# Patient Record
Sex: FEMALE | Race: WHITE | Employment: OTHER | URBAN - METROPOLITAN AREA
[De-identification: names, ages, dates, MRNs, and addresses within clinical notes are randomized per-mention and may not be internally consistent; named-entity substitution may affect disease eponyms.]

---

## 2019-06-18 RX ORDER — FERROUS SULFATE 325(65) MG
325 TABLET ORAL
COMMUNITY
End: 2021-02-26 | Stop reason: ALTCHOICE

## 2019-06-21 ENCOUNTER — PREP FOR PROCEDURE (OUTPATIENT)
Dept: SURGERY | Age: 39
End: 2019-06-21

## 2019-06-21 ENCOUNTER — ANESTHESIA (OUTPATIENT)
Dept: OPERATING ROOM | Age: 39
End: 2019-06-21
Payer: MEDICARE

## 2019-06-21 ENCOUNTER — ANESTHESIA EVENT (OUTPATIENT)
Dept: OPERATING ROOM | Age: 39
End: 2019-06-21
Payer: MEDICARE

## 2019-06-21 ENCOUNTER — APPOINTMENT (OUTPATIENT)
Dept: GENERAL RADIOLOGY | Age: 39
End: 2019-06-21
Attending: PODIATRIST
Payer: MEDICARE

## 2019-06-21 ENCOUNTER — HOSPITAL ENCOUNTER (OUTPATIENT)
Age: 39
Setting detail: OUTPATIENT SURGERY
Discharge: HOME | End: 2019-06-21
Attending: PODIATRIST | Admitting: PODIATRIST
Payer: MEDICARE

## 2019-06-21 VITALS
SYSTOLIC BLOOD PRESSURE: 118 MMHG | RESPIRATION RATE: 16 BRPM | HEART RATE: 69 BPM | BODY MASS INDEX: 32.8 KG/M2 | WEIGHT: 209 LBS | HEIGHT: 67 IN | TEMPERATURE: 97.6 F | OXYGEN SATURATION: 99 % | DIASTOLIC BLOOD PRESSURE: 61 MMHG

## 2019-06-21 VITALS — SYSTOLIC BLOOD PRESSURE: 108 MMHG | OXYGEN SATURATION: 99 % | DIASTOLIC BLOOD PRESSURE: 64 MMHG

## 2019-06-21 DIAGNOSIS — R52 PAIN: ICD-10-CM

## 2019-06-21 DIAGNOSIS — G89.18 POSTOPERATIVE PAIN: Primary | ICD-10-CM

## 2019-06-21 PROBLEM — S92.912A: Status: ACTIVE | Noted: 2019-06-21

## 2019-06-21 LAB — METER GLUCOSE: 91 MG/DL (ref 74–99)

## 2019-06-21 PROCEDURE — 6360000002 HC RX W HCPCS: Performed by: PODIATRIST

## 2019-06-21 PROCEDURE — 3700000001 HC ADD 15 MINUTES (ANESTHESIA): Performed by: PODIATRIST

## 2019-06-21 PROCEDURE — 82962 GLUCOSE BLOOD TEST: CPT

## 2019-06-21 PROCEDURE — 7100000011 HC PHASE II RECOVERY - ADDTL 15 MIN: Performed by: PODIATRIST

## 2019-06-21 PROCEDURE — 6360000002 HC RX W HCPCS: Performed by: ANESTHESIOLOGY

## 2019-06-21 PROCEDURE — 3700000000 HC ANESTHESIA ATTENDED CARE: Performed by: PODIATRIST

## 2019-06-21 PROCEDURE — 3209999900 FLUORO FOR SURGICAL PROCEDURES

## 2019-06-21 PROCEDURE — 3600000003 HC SURGERY LEVEL 3 BASE: Performed by: PODIATRIST

## 2019-06-21 PROCEDURE — 2500000003 HC RX 250 WO HCPCS: Performed by: PODIATRIST

## 2019-06-21 PROCEDURE — 3600000013 HC SURGERY LEVEL 3 ADDTL 15MIN: Performed by: PODIATRIST

## 2019-06-21 PROCEDURE — 2580000003 HC RX 258

## 2019-06-21 PROCEDURE — 6360000002 HC RX W HCPCS

## 2019-06-21 PROCEDURE — 2709999900 HC NON-CHARGEABLE SUPPLY: Performed by: PODIATRIST

## 2019-06-21 PROCEDURE — 7100000010 HC PHASE II RECOVERY - FIRST 15 MIN: Performed by: PODIATRIST

## 2019-06-21 PROCEDURE — 88300 SURGICAL PATH GROSS: CPT

## 2019-06-21 RX ORDER — SODIUM CHLORIDE 0.9 % (FLUSH) 0.9 %
10 SYRINGE (ML) INJECTION EVERY 12 HOURS SCHEDULED
Status: DISCONTINUED | OUTPATIENT
Start: 2019-06-21 | End: 2019-06-21 | Stop reason: HOSPADM

## 2019-06-21 RX ORDER — SODIUM CHLORIDE 0.9 % (FLUSH) 0.9 %
10 SYRINGE (ML) INJECTION PRN
Status: DISCONTINUED | OUTPATIENT
Start: 2019-06-21 | End: 2019-06-21 | Stop reason: HOSPADM

## 2019-06-21 RX ORDER — SODIUM CHLORIDE 0.9 % (FLUSH) 0.9 %
10 SYRINGE (ML) INJECTION PRN
Status: CANCELLED | OUTPATIENT
Start: 2019-06-21

## 2019-06-21 RX ORDER — MIDAZOLAM HYDROCHLORIDE 1 MG/ML
2 INJECTION INTRAMUSCULAR; INTRAVENOUS ONCE
Status: COMPLETED | OUTPATIENT
Start: 2019-06-21 | End: 2019-06-21

## 2019-06-21 RX ORDER — SODIUM CHLORIDE 0.9 % (FLUSH) 0.9 %
10 SYRINGE (ML) INJECTION EVERY 12 HOURS SCHEDULED
Status: CANCELLED | OUTPATIENT
Start: 2019-06-21

## 2019-06-21 RX ORDER — MIDAZOLAM HYDROCHLORIDE 1 MG/ML
INJECTION INTRAMUSCULAR; INTRAVENOUS PRN
Status: DISCONTINUED | OUTPATIENT
Start: 2019-06-21 | End: 2019-06-21 | Stop reason: SDUPTHER

## 2019-06-21 RX ORDER — OXYCODONE AND ACETAMINOPHEN 7.5; 325 MG/1; MG/1
1 TABLET ORAL EVERY 6 HOURS PRN
Qty: 28 TABLET | Refills: 0 | Status: SHIPPED | OUTPATIENT
Start: 2019-06-21 | End: 2019-06-28

## 2019-06-21 RX ORDER — SODIUM CHLORIDE, SODIUM LACTATE, POTASSIUM CHLORIDE, CALCIUM CHLORIDE 600; 310; 30; 20 MG/100ML; MG/100ML; MG/100ML; MG/100ML
INJECTION, SOLUTION INTRAVENOUS CONTINUOUS PRN
Status: DISCONTINUED | OUTPATIENT
Start: 2019-06-21 | End: 2019-06-21 | Stop reason: SDUPTHER

## 2019-06-21 RX ORDER — BUPIVACAINE HYDROCHLORIDE 5 MG/ML
INJECTION, SOLUTION EPIDURAL; INTRACAUDAL PRN
Status: DISCONTINUED | OUTPATIENT
Start: 2019-06-21 | End: 2019-06-21 | Stop reason: ALTCHOICE

## 2019-06-21 RX ORDER — PROPOFOL 10 MG/ML
INJECTION, EMULSION INTRAVENOUS CONTINUOUS PRN
Status: DISCONTINUED | OUTPATIENT
Start: 2019-06-21 | End: 2019-06-21 | Stop reason: SDUPTHER

## 2019-06-21 RX ADMIN — PROPOFOL 130 MCG/KG/MIN: 10 INJECTION, EMULSION INTRAVENOUS at 08:06

## 2019-06-21 RX ADMIN — SODIUM CHLORIDE, POTASSIUM CHLORIDE, SODIUM LACTATE AND CALCIUM CHLORIDE: 600; 310; 30; 20 INJECTION, SOLUTION INTRAVENOUS at 07:58

## 2019-06-21 RX ADMIN — Medication 2 G: at 07:59

## 2019-06-21 RX ADMIN — MIDAZOLAM HYDROCHLORIDE 2 MG: 1 INJECTION, SOLUTION INTRAMUSCULAR; INTRAVENOUS at 07:14

## 2019-06-21 RX ADMIN — MIDAZOLAM HYDROCHLORIDE 2 MG: 1 INJECTION, SOLUTION INTRAMUSCULAR; INTRAVENOUS at 07:58

## 2019-06-21 ASSESSMENT — PULMONARY FUNCTION TESTS
PIF_VALUE: 0
PIF_VALUE: 1
PIF_VALUE: 0

## 2019-06-21 ASSESSMENT — PAIN - FUNCTIONAL ASSESSMENT: PAIN_FUNCTIONAL_ASSESSMENT: 0-10

## 2019-06-21 ASSESSMENT — PAIN SCALES - GENERAL
PAINLEVEL_OUTOF10: 0
PAINLEVEL_OUTOF10: 0

## 2019-06-21 ASSESSMENT — LIFESTYLE VARIABLES: SMOKING_STATUS: 1

## 2019-06-21 ASSESSMENT — PAIN DESCRIPTION - DESCRIPTORS: DESCRIPTORS: SHARP;THROBBING

## 2019-06-21 NOTE — ANESTHESIA PRE PROCEDURE
Department of Anesthesiology  Preprocedure Note       Name:  Kallie Pisano   Age:  35 y.o.  :  1980                                          MRN:  [id number]         Date:  2019      Surgeon: Jessica Knowles):  Lali Wilder DPM    Procedure: OPEN REDUCTION INTERNAL FIXATION THIRD TOE ON LEFT  ++KWIRES++  +++LATEX ALLERGY+++ (Left )    Medications prior to admission:   Prior to Admission medications    Medication Sig Start Date End Date Taking? Authorizing Provider   ferrous sulfate 325 (65 Fe) MG tablet Take 325 mg by mouth   Yes Historical Provider, MD   acarbose (PRECOSE) 50 MG tablet take 1 tablet by mouth three times a day with meals 19  Yes Historical Provider, MD   albuterol sulfate HFA (PROVENTIL HFA) 108 (90 Base) MCG/ACT inhaler Inhale 2 puffs into the lungs as needed Use am dos if needed and bring 18  Yes Historical Provider, MD   ALPRAZolam (XANAX) 0.5 MG tablet Take 0.5 mg by mouth 3 times daily.  Take am [facility] 06/21 1/10/19  Yes Historical Provider, MD   benztropine (COGENTIN) 2 MG tablet Take 2 mg by mouth 2 times daily Take am [facility] 19  Yes Historical Provider, MD   cloZAPine (CLOZARIL) 100 MG tablet Take 300 mg by mouth nightly  18  Yes Historical Provider, MD SUBRAMANIAN VITAMIN B-12 TR 1000 MCG TBCR take 1 tablet by mouth once daily as directed 19  Yes Historical Provider, MD   EPINEPHrine (Marlin Patshila 2-MARIBEL) 0.15 MG/0.3ML SOAJ Inject into the skin 3/12/08  Yes Historical Provider, MD   vitamin D (ERGOCALCIFEROL) 77163 units CAPS capsule Take 50,000 Units by mouth once a week  19  Yes Historical Provider, MD   estradiol (VIVELLE) 0.1 MG/24HR  19  Yes Historical Provider, MD   famotidine (PEPCID) 20 MG tablet Take 20 mg by mouth 2 times daily Take am dos 19  Yes Historical Provider, MD   fluticasone (FLONASE) 50 MCG/ACT nasal spray instill 1 spray into each nostril once daily for congestion 19  Yes Historical Provider, MD   Lactobacillus  Ketorolac Tromethamine Nausea Only    Pregabalin Other (See Comments)    Tramadol Nausea Only    Buspirone Hcl Rash    Clindamycin Rash    Gabapentin Rash    Metronidazole Rash    Morphine Rash     IV \"a red line started going up towards my heart\"       Problem List:  There is no problem list on file for this patient.       Past Medical History:        Diagnosis Date    Agoraphobia     Anemia     Asthma     Atypical nevi     SEV ATN L flank , Mod to Sev ATN L mid back     Bipolar 1 disorder ([address]     Borderline personality disorder ([address]     Claustrophobia     Fibromyalgia     Fracture of third toe, left, closed 2019    Generalized anxiety disorder     Hidradenitis suppurativa     usually axillas    Hypoglycemia     Paranoid schizophrenia ([address]     PONV (postoperative nausea and vomiting)     Seizure ([address]     if sugar drops    Severe depression ([address]        Past Surgical History:        Procedure Laterality Date    ANKLE SURGERY Left 2009    CARPAL TUNNEL RELEASE Left 1998     SECTION  2004    ENDOMETRIAL ABLATION  2009    Novasure    FOOT FUSION       x3     GASTRIC BYPASS SURGERY  1999    HERNIA REPAIR  2005    left abdomen    HYSTERECTOMY  2009    KNEE ARTHROSCOPY Left 2012    KNEE ARTHROSCOPY Right     x 2 in 2014    LEG TENDON SURGERY Left     OTHER SURGICAL HISTORY  2009    staples removed from hernia    OVARY REMOVAL  2006    ROTATOR CUFF REPAIR Right 1997    ROTATOR CUFF REPAIR Right 2012    revision    SHOULDER ARTHROSCOPY Right     TEMPOROMANDIBULAR JOINT SURGERY Left 2008    TEMPOROMANDIBULAR JOINT SURGERY Bilateral     TUBAL LIGATION  2004    VAGAL NERVE STIMULATION  2006 battery   [id number] [facility]       Social History:    Social History     Tobacco Use    Smoking status: Former Smoker     Packs/day: 1.00     Years: 20.00     Pack years: 20.00     Types: Cigarettes    Smokeless tobacco: Never Used   Substance Use Topics    Alcohol use: Not Currently     Comment: sober since 2011                                Counseling given: Not Answered      Vital Signs (Current):   Vitals:    06/18/19 1243   Weight: 213 lb (96.6 kg)   Height: 5' 6.5\" (1.689 m)                                              BP Readings from Last 3 Encounters:   No data found for BP       NPO Status:  1930 on 6/20/2019                                                                               BMI:   Wt Readings from Last 3 Encounters:   06/18/19 213 lb (96.6 kg)     Body mass index is 33.86 kg/m². CBC: No results found for: WBC, RBC, HGB, HCT, MCV, RDW, PLT    CMP: No results found for: NA, K, CL, CO2, BUN, CREATININE, GFRAA, AGRATIO, LABGLOM, GLUCOSE, PROT, CALCIUM, BILITOT, ALKPHOS, AST, ALT    POC Tests: No results for input(s): POCGLU, POCNA, POCK, POCCL, POCBUN, POCHEMO, POCHCT in the last 72 hours. Coags: No results found for: PROTIME, INR, APTT    HCG (If Applicable): No results found for: PREGTESTUR, PREGSERUM, HCG, HCGQUANT     ABGs: No results found for: PHART, PO2ART, EEM5JJH, NWG1YHG, BEART, Y7UMEVKA     Type & Screen (If Applicable):  No results found for: [facility]    Anesthesia Evaluation  Patient summary reviewed and Nursing notes reviewed   history of anesthetic complications: PONV.   Airway: Mallampati: III  TM distance: >3 FB   Neck ROM: full  Mouth opening: > = 3 FB Dental:    (+) implants  Comment: Pt has upper and lower implants    Pulmonary: breath sounds clear to auscultation  (+) asthma: exercise-induced asthma, current smoker (some day smoker)                           Cardiovascular:Negative CV ROS            Rhythm: regular  Rate: normal           Beta Blocker:  Not on Beta Blocker         Neuro/Psych:   (+) seizures (r/t hypoglycemia ): well controlled, neuromuscular disease (fibromyalgia):, psychiatric history: stable with treatmentdepression/anxiety              ROS comment: VAGAL NERVE STIMULATION GI/Hepatic/Renal:   (+) GERD: well controlled,          ROS comment: S/p gastric bypass. Endo/Other:    (+) hypothyroidism::., .                  ROS comment: TEMPOROMANDIBULAR JOINT SURGERY Abdominal:           Vascular: negative vascular ROS. Anesthesia Plan      MAC     ASA 3       Induction: intravenous. Anesthetic plan and risks discussed with patient. Plan discussed with CRNA and attending. Lalo Chong RN   6/21/2019    DOS STAFF ADDENDUM:    Patient seen and examined, physical exam updated as needed, chart reviewed. NPO status confirmed. Anesthetic options and risks discussed with patient/legal guardian. Patient/legal guardian verbalized understanding and agrees to proceed.      Corinne Cape, MD  Staff Anesthesiologist  June 21, 2019  6:59 AM

## 2019-06-21 NOTE — ANESTHESIA POSTPROCEDURE EVALUATION
Department of Anesthesiology  Postprocedure Note    Patient: Cesar Barnett  MRN: [id number]  YOB: 1980  Date of evaluation: 6/21/2019  Time:  8:56 AM     Procedure Summary     Date:  06/21/19 Room / Location:  [facility] OR  / [facility] OR    Anesthesia Start:  5219 Anesthesia Stop:  3783    Procedure:  REMOVAL PAINFUL FIXATION THIRD TOE LEFT FOOT (Left ) Diagnosis:  (LEFT THIRD TOE FRACTURE)    Surgeon:  Santhosh Plummer DPM Responsible Provider:  Bobby Esparza MD    Anesthesia Type:  MAC ASA Status:  3          Anesthesia Type: MAC    Michaela Phase I: Michaela Score: 10    Michaela Phase II:      Last vitals: Reviewed and per EMR flowsheets.        Anesthesia Post Evaluation    Patient location during evaluation: PACU  Patient participation: complete - patient participated  Level of consciousness: awake and alert  Airway patency: patent  Nausea & Vomiting: no nausea and no vomiting  Complications: no  Cardiovascular status: hemodynamically stable  Respiratory status: acceptable  Hydration status: euvolemic

## 2020-06-09 PROBLEM — D48.5 NEOPLASM OF UNCERTAIN BEHAVIOR OF SKIN OF TRUNK: Status: ACTIVE | Noted: 2020-06-09

## 2020-08-31 ENCOUNTER — HOSPITAL ENCOUNTER (OUTPATIENT)
Age: 40
Discharge: HOME | End: 2020-09-02

## 2020-08-31 PROCEDURE — 88300 SURGICAL PATH GROSS: CPT

## 2020-10-01 ENCOUNTER — HOSPITAL ENCOUNTER (OUTPATIENT)
Age: 40
Discharge: HOME | End: 2020-10-03
Payer: MEDICARE

## 2020-10-01 PROCEDURE — 87070 CULTURE OTHR SPECIMN AEROBIC: CPT

## 2020-10-01 PROCEDURE — 87205 SMEAR GRAM STAIN: CPT

## 2020-10-01 PROCEDURE — 87075 CULTR BACTERIA EXCEPT BLOOD: CPT

## 2020-10-02 LAB — GRAM STAIN ORDERABLE: NORMAL

## 2020-10-03 LAB
ANAEROBIC CULTURE: NORMAL
ORGANISM: ABNORMAL
WOUND/ABSCESS: ABNORMAL

## 2020-10-11 ENCOUNTER — HOSPITAL ENCOUNTER (EMERGENCY)
Age: 40
Discharge: HOME | End: 2020-10-11
Attending: EMERGENCY MEDICINE
Payer: MEDICARE

## 2020-10-11 VITALS
DIASTOLIC BLOOD PRESSURE: 68 MMHG | OXYGEN SATURATION: 98 % | WEIGHT: 240 LBS | BODY MASS INDEX: 37.67 KG/M2 | HEIGHT: 67 IN | RESPIRATION RATE: 16 BRPM | SYSTOLIC BLOOD PRESSURE: 110 MMHG | HEART RATE: 82 BPM | TEMPERATURE: 98 F

## 2020-10-11 LAB
ANION GAP SERPL CALCULATED.3IONS-SCNC: 8 MMOL/L (ref 7–16)
BASOPHILS ABSOLUTE: 0.04 E9/L (ref 0–0.2)
BASOPHILS RELATIVE PERCENT: 0.5 % (ref 0–2)
BUN BLDV-MCNC: 8 MG/DL (ref 6–20)
C-REACTIVE PROTEIN: 2.8 MG/DL (ref 0–0.4)
CALCIUM SERPL-MCNC: 9.4 MG/DL (ref 8.6–10.2)
CHLORIDE BLD-SCNC: 100 MMOL/L (ref 98–107)
CO2: 26 MMOL/L (ref 22–29)
CREAT SERPL-MCNC: 0.7 MG/DL (ref 0.5–1)
EOSINOPHILS ABSOLUTE: 0.13 E9/L (ref 0.05–0.5)
EOSINOPHILS RELATIVE PERCENT: 1.6 % (ref 0–6)
GFR AFRICAN AMERICAN: >60
GFR NON-AFRICAN AMERICAN: >60 ML/MIN/1.73
GLUCOSE BLD-MCNC: 96 MG/DL (ref 74–99)
HCT VFR BLD CALC: 41.2 % (ref 34–48)
HEMOGLOBIN: 13 G/DL (ref 11.5–15.5)
IMMATURE GRANULOCYTES #: 0.03 E9/L
IMMATURE GRANULOCYTES %: 0.4 % (ref 0–5)
LYMPHOCYTES ABSOLUTE: 2.08 E9/L (ref 1.5–4)
LYMPHOCYTES RELATIVE PERCENT: 26.3 % (ref 20–42)
MCH RBC QN AUTO: 29.5 PG (ref 26–35)
MCHC RBC AUTO-ENTMCNC: 31.6 % (ref 32–34.5)
MCV RBC AUTO: 93.6 FL (ref 80–99.9)
MONOCYTES ABSOLUTE: 0.7 E9/L (ref 0.1–0.95)
MONOCYTES RELATIVE PERCENT: 8.8 % (ref 2–12)
NEUTROPHILS ABSOLUTE: 4.94 E9/L (ref 1.8–7.3)
NEUTROPHILS RELATIVE PERCENT: 62.4 % (ref 43–80)
PDW BLD-RTO: 12.6 FL (ref 11.5–15)
PLATELET # BLD: 343 E9/L (ref 130–450)
PMV BLD AUTO: 9.4 FL (ref 7–12)
POTASSIUM REFLEX MAGNESIUM: 4.2 MMOL/L (ref 3.5–5)
RBC # BLD: 4.4 E12/L (ref 3.5–5.5)
SEDIMENTATION RATE, ERYTHROCYTE: 33 MM/HR (ref 0–20)
SODIUM BLD-SCNC: 134 MMOL/L (ref 132–146)
WBC # BLD: 7.9 E9/L (ref 4.5–11.5)

## 2020-10-11 PROCEDURE — 85651 RBC SED RATE NONAUTOMATED: CPT

## 2020-10-11 PROCEDURE — 87070 CULTURE OTHR SPECIMN AEROBIC: CPT

## 2020-10-11 PROCEDURE — 87075 CULTR BACTERIA EXCEPT BLOOD: CPT

## 2020-10-11 PROCEDURE — 36415 COLL VENOUS BLD VENIPUNCTURE: CPT

## 2020-10-11 PROCEDURE — 85025 COMPLETE CBC W/AUTO DIFF WBC: CPT

## 2020-10-11 PROCEDURE — 12001 RPR S/N/AX/GEN/TRNK 2.5CM/<: CPT

## 2020-10-11 PROCEDURE — 99283 EMERGENCY DEPT VISIT LOW MDM: CPT

## 2020-10-11 PROCEDURE — 86140 C-REACTIVE PROTEIN: CPT

## 2020-10-11 PROCEDURE — 80048 BASIC METABOLIC PNL TOTAL CA: CPT

## 2020-10-11 ASSESSMENT — ENCOUNTER SYMPTOMS
VOMITING: 0
DIARRHEA: 0
SINUS PRESSURE: 0
COUGH: 0
SORE THROAT: 0
NAUSEA: 0
SHORTNESS OF BREATH: 0
EYE PAIN: 0
EYE DISCHARGE: 0
ABDOMINAL DISTENTION: 0
EYE REDNESS: 0
BACK PAIN: 0
WHEEZING: 0

## 2020-10-11 ASSESSMENT — PAIN SCALES - GENERAL: PAINLEVEL_OUTOF10: 9

## 2020-10-11 NOTE — ED PROVIDER NOTES
Chief Complaint   Patient presents with    Wound Check       Patient is a 44-year-old female who presents today with a wound to the left lower extremity. Patient states on Friday she was seen by podiatry, Dr. Cheryln Denver had a nerve release surgery performed. She states area was closed and she was discharged home. She did notice bleeding Friday night, she called her podiatrist who informed her to come back to the ER. On presentation patient does have appear to have lost the bottom stitch to the suture and does have a small open area at the inferior aspect of her stitches. There is no fluid draining from this area, no evidence of erythema. There is mild tenderness palpation. Patient denies fevers, chills, chest pain or shortness of breath. She denies numbness, tingling, weakness in her extremities. The history is provided by the patient. No  was used. Review of Systems   Constitutional: Negative for chills and fever. HENT: Negative for ear pain, sinus pressure and sore throat. Eyes: Negative for pain, discharge and redness. Respiratory: Negative for cough, shortness of breath and wheezing. Cardiovascular: Negative for chest pain. Gastrointestinal: Negative for abdominal distention, diarrhea, nausea and vomiting. Genitourinary: Negative for dysuria and frequency. Musculoskeletal: Negative for arthralgias and back pain. Skin: Positive for wound. Negative for rash. Neurological: Negative for weakness and headaches. Hematological: Negative for adenopathy. All other systems reviewed and are negative. Physical Exam  Vitals signs and nursing note reviewed. Constitutional:       General: She is not in acute distress. Appearance: Normal appearance. She is well-developed. She is obese. She is not ill-appearing. HENT:      Head: Normocephalic and atraumatic. Eyes:      Pupils: Pupils are equal, round, and reactive to light.    Neck: Musculoskeletal: Normal range of motion and neck supple. Cardiovascular:      Rate and Rhythm: Normal rate and regular rhythm. Pulmonary:      Effort: Pulmonary effort is normal. No respiratory distress. Breath sounds: Normal breath sounds. No wheezing or rales. Abdominal:      General: Bowel sounds are normal.      Palpations: Abdomen is soft. Tenderness: There is no abdominal tenderness. There is no guarding or rebound. Skin:     General: Skin is warm and dry. Findings: Bruising present. Comments: Patient has a linear incision on the medial aspect of her left ankle posterior to the medial malleolus, stitches appear normal, there does appear to be one stitch that has fallen out at the inferior aspect which is leaving a small open gap. No erythema or fluid draining   Neurological:      Mental Status: She is alert and oriented to person, place, and time. Cranial Nerves: No cranial nerve deficit. Coordination: Coordination normal.          Procedures           Labs Reviewed   CBC WITH AUTO DIFFERENTIAL - Abnormal; Notable for the following components:       Result Value    MCHC 31.6 (*)     All other components within normal limits   SEDIMENTATION RATE - Abnormal; Notable for the following components:    Sed Rate 33 (*)     All other components within normal limits   C-REACTIVE PROTEIN - Abnormal; Notable for the following components:    CRP 2.8 (*)     All other components within normal limits   CULTURE, WOUND   CULTURE, ANAEROBIC   BASIC METABOLIC PANEL W/ REFLEX TO MG FOR LOW K     No orders to display         MDM  Number of Diagnoses or Management Options  Postoperative surgical complication involving subcutaneous tissue associated with non-dermatologic procedure, unspecified complication:   Diagnosis management comments: Patient is a 50-year-old female presents today for pain in bleeding from her left foot after surgery on Friday.   Podiatry resident did come to the ER for evaluation. It is likely that 1 stitch has become loose at the bottom of her incision which is causing the bleeding. Podiatry resident did obtain cultures, and did revise the sutures. Sed rate and CRP are elevated. White count is normal.  Cultures were sent. Patient does have follow-up with her podiatrist this Thursday. She is already on antibiotics per podiatry. Podiatry is comfortable with the patient being discharged home today and following up in the office. Vitals are remained stable. Amount and/or Complexity of Data Reviewed  Clinical lab tests: reviewed           ED Course as of Oct 11 1632   Sun Oct 11, 2020   1142 Podiatry is present at bedside, they will revise sutures    [JH]      ED Course User Index  [JH] Isma Sanchez DO       --------------------------------------------- PAST HISTORY ---------------------------------------------  Past Medical History:  has a past medical history of Agoraphobia, Anemia, Arthritis, Asthma, Atypical nevi, Bipolar 1 disorder ([address] Borderline personality disorder ([address] Claustrophobia, Fibromyalgia, Fracture of third toe, left, closed, Generalized anxiety disorder, Hidradenitis suppurativa, Hypoglycemia, Paranoid schizophrenia ([address] PONV (postoperative nausea and vomiting), Seizure ([address] Severe depression ([address] and Thyroid disease. Past Surgical History:  has a past surgical history that includes Gastric bypass surgery (); Allyn tooth extraction (); Rotator cuff repair (Right, ); Carpal tunnel release (Left, );  section (); Tubal ligation (); hernia repair (); Ovary removal (); vagal nerve stimulation (); Shoulder arthroscopy (Right); Temporomandibular joint surgery (Left, 2008); Temporomandibular joint surgery (Bilateral, ); Endometrial ablation (); Hysterectomy (); other surgical history (2009); Ankle surgery (Left, 2009); Rotator cuff repair (Right, 2012); Knee arthroscopy (Left, 2012);  Knee arthroscopy (Right); Leg Tendon Surgery (Left); Foot Fusion; Foot fracture surgery (Left, 6/21/2019); and other surgical history (Left, 06/08/2020). Social History:  reports that she quit smoking about 10 years ago. Her smoking use included cigarettes. She has a 20.00 pack-year smoking history. She has never used smokeless tobacco. She reports previous alcohol use. She reports that she does not use drugs. Family History: family history is not on file. The patients home medications have been reviewed. Allergies: Latex; Fish allergy;  Fentanyl; Hydrocodone-acetaminophen; Ketorolac tromethamine; Pregabalin; Tramadol; Buspirone hcl; Clindamycin; Gabapentin; Metronidazole; and Morphine    -------------------------------------------------- RESULTS -------------------------------------------------  Labs:  Results for orders placed or performed during the hospital encounter of 10/11/20   CBC Auto Differential   Result Value Ref Range    WBC 7.9 4.5 - 11.5 E9/L    RBC 4.40 3.50 - 5.50 E12/L    Hemoglobin 13.0 11.5 - 15.5 g/dL    Hematocrit 41.2 34.0 - 48.0 %    MCV 93.6 80.0 - 99.9 fL    MCH 29.5 26.0 - 35.0 pg    MCHC 31.6 (L) 32.0 - 34.5 %    RDW 12.6 11.5 - 15.0 fL    Platelets 598 426 - 631 E9/L    MPV 9.4 7.0 - 12.0 fL    Neutrophils % 62.4 43.0 - 80.0 %    Immature Granulocytes % 0.4 0.0 - 5.0 %    Lymphocytes % 26.3 20.0 - 42.0 %    Monocytes % 8.8 2.0 - 12.0 %    Eosinophils % 1.6 0.0 - 6.0 %    Basophils % 0.5 0.0 - 2.0 %    Neutrophils Absolute 4.94 1.80 - 7.30 E9/L    Immature Granulocytes # 0.03 E9/L    Lymphocytes Absolute 2.08 1.50 - 4.00 E9/L    Monocytes Absolute 0.70 0.10 - 0.95 E9/L    Eosinophils Absolute 0.13 0.05 - 0.50 E9/L    Basophils Absolute 0.04 0.00 - 0.20 M6/P   Basic Metabolic Panel w/ Reflex to MG   Result Value Ref Range    Sodium 134 132 - 146 mmol/L    Potassium reflex Magnesium 4.2 3.5 - 5.0 mmol/L    Chloride 100 98 - 107 mmol/L    CO2 26 22 - 29 mmol/L    Anion Gap 8 7 - 16 mmol/L    Glucose 96 74 - 99 mg/dL    BUN 8 6 - 20 mg/dL    CREATININE 0.7 0.5 - 1.0 mg/dL    GFR Non-African American >60 >=60 mL/min/1.73    GFR African American >60     Calcium 9.4 8.6 - 10.2 mg/dL   Sedimentation Rate   Result Value Ref Range    Sed Rate 33 (H) 0 - 20 mm/Hr   C-Reactive Protein   Result Value Ref Range    CRP 2.8 (H) 0.0 - 0.4 mg/dL       Radiology:  No orders to display       ------------------------- NURSING NOTES AND VITALS REVIEWED ---------------------------  Date / Time Roomed:  10/11/2020 10:40 AM  ED Bed Assignment:  28/28    The nursing notes within the ED encounter and vital signs as below have been reviewed. /68   Pulse 82   Temp 98 °F (36.7 °C)   Resp 16   Ht 5' 6.5\" (1.689 m)   Wt 240 lb (108.9 kg)   SpO2 98%   BMI 38.16 kg/m²   Oxygen Saturation Interpretation: Normal      ------------------------------------------ PROGRESS NOTES ------------------------------------------  I have spoken with the patient and discussed todays results, in addition to providing specific details for the plan of care and counseling regarding the diagnosis and prognosis. Their questions are answered at this time and they are agreeable with the plan. I discussed at length with them reasons for immediate return here for re evaluation. They will followup with primary care by calling their office tomorrow. --------------------------------- ADDITIONAL PROVIDER NOTES ---------------------------------  At this time the patient is without objective evidence of an acute process requiring hospitalization or inpatient management. They have remained hemodynamically stable throughout their entire ED visit and are stable for discharge with outpatient follow-up. The plan has been discussed in detail and they are aware of the specific conditions for emergent return, as well as the importance of follow-up. Discharge Medication List as of 10/11/2020 12:49 PM          Diagnosis:  1.

## 2020-10-11 NOTE — PROCEDURES
Date of service: 10/11/2020    Pre-procedure dx: wound, left foot  Post procedure dx: same    Procedure: Wound closure, left foot    Indications: Patient was seen and examined in the emergency room for strikethrough of her sx dressing as well as gapping of the incision site. Patient states she noticed strikethrough yesterday. She called and was instructed to reinforce the sx dressing,however she took the dressing down and reapplied a new one. The patient was instructed to go to the emergency room for inspection of surgical site and wound closure. Upon presentation,   Vasc: pedal pulses are palpable, CFT <3 seconds to the tips of the digits  Neuro: intact  Derm: distal 1cm of incision with noteable gapping. There is a ruptured monocryl stitch noted in wound bed. Remainder of surgical site well coapted with sutures intact. No cardinal signs of infection noted. There is no purulent drainage noted. eccymosis noted at the proximal aspect. MSK: no crepitus or fluctuance noted upon palpation of surfical site. Aerobic and anaerobic cx were obtained. CBC w/ diff, ESR and CRP were ordered. After all benefits risks and complications were discussed in detail the patient was consented for wound closure of the left foot. Procedure: The left foot was prepped in the usual asceptic manor with alcohol prep as pt has allergy to betadine. A local block of 10cc  lidocaine plain was given proximal to the surgical site. A 1cm area of gapping to surgical site was noted to the distal aspect. The wound was flushed with sterile saline and was inspected. No contamination or residual suture were noted. The wound edges were reapproximated utilizing 3-0 Prolene in a horizontal mattress and simple interrupted fashion. Wound was clean and edges appeared to be well coapted. A dry sterile dressing consisting of adaptic, 4x4 gauze, kerlix, and ace was applied to the left lower extremity.  The patient tolerated the procedure well and is to keep the dressing clean, dry and intact until follow-up in clinic on 10/15/2020. Josselyn Jett.  [place] CALIXTOM  Fellow- Foot and Ankle Surgery  [phone]

## 2020-10-13 LAB — WOUND/ABSCESS: NORMAL

## 2020-10-16 LAB — ANAEROBIC CULTURE: NORMAL

## 2020-10-29 ENCOUNTER — HOSPITAL ENCOUNTER (OUTPATIENT)
Age: 40
Discharge: HOME | End: 2020-10-31
Payer: MEDICARE

## 2020-10-29 PROCEDURE — 87070 CULTURE OTHR SPECIMN AEROBIC: CPT

## 2020-10-29 PROCEDURE — 87075 CULTR BACTERIA EXCEPT BLOOD: CPT

## 2020-10-29 PROCEDURE — 87205 SMEAR GRAM STAIN: CPT

## 2020-10-30 LAB — GRAM STAIN ORDERABLE: NORMAL

## 2020-11-01 LAB
ANAEROBIC CULTURE: NORMAL
WOUND/ABSCESS: NORMAL

## 2021-09-27 LAB — MRSA CULTURE ONLY: NORMAL

## 2021-12-02 RX ORDER — OXYCODONE HYDROCHLORIDE 10 MG/1
10 TABLET ORAL DAILY
COMMUNITY

## 2021-12-02 NOTE — PROGRESS NOTES
Have you been tested for COVID  Yes           Have you been told you were positive for COVID No  Have you had any known exposure to someone that is positive for COVID No  Do you have a cough                   No              Do you have shortness of breath No                 Do you have a sore throat            No                Are you having chills                    No                Are you having muscle aches. No                    Please come to the hospital wearing a mask and have your significant other wear a mask as well. Both of you should check your temperature before leaving to come here,  if it is 100 or higher please call [phone] for instruction.

## 2021-12-02 NOTE — PROGRESS NOTES
[facility] PRE-ADMISSION TESTING INSTRUCTIONS    The Preadmission Testing patient is instructed accordingly using the following criteria (check applicable):    ARRIVAL INSTRUCTIONS:  [x] Parking the day of Surgery is located in the Main Entrance lot. Upon entering the door, make an immediate right to the surgery reception desk    [x] Bring photo ID and insurance card    [] Bring in a copy of Living will or Durable Power of  papers. [x] Please be sure to arrange for responsible adult to provide transportation to and from the hospital    [x] Please arrange for responsible adult to be with you for the 24 hour period post procedure due to having anesthesia      GENERAL INSTRUCTIONS:    [x] Nothing by mouth after midnight, including gum, candy, mints or water    [x] You may brush your teeth, but do not swallow any water    [x] Take medications as instructed with 1-2 oz of water    [x] Stop herbal supplements and vitamins 5 days prior to procedure    [] Follow preop dosing of blood thinners per physician instructions    [] Take 1/2 dose of evening insulin, but no insulin after midnight    [x] No oral diabetic medications after midnight    [x] If diabetic and have low blood sugar or feel symptomatic, take 1-2oz apple juice only    [x] Bring inhalers day of surgery    [] Bring C-PAP/ Bi-Pap day of surgery    [] Bring urine specimen day of surgery    [x] Shower or bath with soap, lather and rinse well, AM of Surgery, no lotion, powders or creams to surgical site    [] Follow bowel prep as instructed per surgeon    [x] No tobacco products within 24 hours of surgery     [x] No alcohol or illegal drug use within 24 hours of surgery.     [x] Jewelry, body piercing's, eyeglasses, contact lenses and dentures are not permitted into surgery (bring cases)      [x] Please do not wear any nail polish, make up or hair products on the day of surgery    [x] You can expect a call the business day prior to procedure to notify you if your arrival time changes    [x] If you receive a survey after surgery we would greatly appreciate your comments    [] Parent/guardian of a minor must accompany their child and remain on the premises  the entire time they are under our care     [] Pediatric patients may bring favorite toy, blanket or comfort item with them    [] A caregiver or family member must remain with the patient during their stay if they are mentally handicapped, have dementia, disoriented or unable to use a call light or would be a safety concern if left unattended    [x] Please notify surgeon if you develop any illness between now and time of surgery (cold, cough, sore throat, fever, nausea, vomiting) or any signs of infections  including skin, wounds, and dental.    []  The Outpatient Pharmacy is available to fill your prescription here on your day of surgery, ask your preop nurse for details    [] Other instructions    EDUCATIONAL MATERIALS PROVIDED:    [] PAT Preoperative Education Packet/Booklet     [] Medication List    [] Transfusion bracelet applied with instructions    [] Shower with soap, lather and rinse well, and use CHG wipes provided the evening before surgery as instructed    [] Incentive spirometer with instructions

## 2021-12-03 ENCOUNTER — ANESTHESIA EVENT (OUTPATIENT)
Dept: OPERATING ROOM | Age: 41
End: 2021-12-03
Payer: MEDICARE

## 2021-12-06 ENCOUNTER — HOSPITAL ENCOUNTER (OUTPATIENT)
Dept: GENERAL RADIOLOGY | Age: 41
Setting detail: SURGERY ADMIT
Discharge: HOME | End: 2021-12-08
Attending: PODIATRIST
Payer: MEDICARE

## 2021-12-06 ENCOUNTER — ANESTHESIA (OUTPATIENT)
Dept: OPERATING ROOM | Age: 41
End: 2021-12-06
Payer: MEDICARE

## 2021-12-06 ENCOUNTER — HOSPITAL ENCOUNTER (OUTPATIENT)
Age: 41
Setting detail: OBSERVATION
Discharge: NURSING, REHAB OR LONG-TERM CARE | End: 2021-12-09
Attending: PODIATRIST | Admitting: FAMILY MEDICINE
Payer: MEDICARE

## 2021-12-06 VITALS
SYSTOLIC BLOOD PRESSURE: 149 MMHG | OXYGEN SATURATION: 100 % | RESPIRATION RATE: 3 BRPM | TEMPERATURE: 99.3 F | DIASTOLIC BLOOD PRESSURE: 72 MMHG

## 2021-12-06 DIAGNOSIS — Z98.890 STATUS POST OSTEOTOMY: Primary | ICD-10-CM

## 2021-12-06 DIAGNOSIS — R52 PAIN: ICD-10-CM

## 2021-12-06 PROBLEM — M19.079 ANKLE ARTHRITIS: Status: ACTIVE | Noted: 2021-12-06

## 2021-12-06 PROBLEM — Z96.661 HISTORY OF TOTAL REPLACEMENT OF RIGHT ANKLE: Status: ACTIVE | Noted: 2021-12-06

## 2021-12-06 PROBLEM — E03.9 ACQUIRED HYPOTHYROIDISM: Status: ACTIVE | Noted: 2021-12-06

## 2021-12-06 PROBLEM — J45.20 MILD INTERMITTENT ASTHMA: Status: ACTIVE | Noted: 2021-12-06

## 2021-12-06 LAB — METER GLUCOSE: 110 MG/DL (ref 74–99)

## 2021-12-06 PROCEDURE — 99220 PR INITIAL OBSERVATION CARE/DAY 70 MINUTES: CPT | Performed by: FAMILY MEDICINE

## 2021-12-06 PROCEDURE — C1713 ANCHOR/SCREW BN/BN,TIS/BN: HCPCS | Performed by: PODIATRIST

## 2021-12-06 PROCEDURE — 6360000002 HC RX W HCPCS

## 2021-12-06 PROCEDURE — 2500000003 HC RX 250 WO HCPCS

## 2021-12-06 PROCEDURE — 88311 DECALCIFY TISSUE: CPT

## 2021-12-06 PROCEDURE — 2720000001 HC MISC SURG SUPPLY STERILE $51-500: Performed by: PODIATRIST

## 2021-12-06 PROCEDURE — 3209999900 FLUORO FOR SURGICAL PROCEDURES

## 2021-12-06 PROCEDURE — C1776 JOINT DEVICE (IMPLANTABLE): HCPCS | Performed by: PODIATRIST

## 2021-12-06 PROCEDURE — 6360000002 HC RX W HCPCS: Performed by: NURSE ANESTHETIST, CERTIFIED REGISTERED

## 2021-12-06 PROCEDURE — 2500000003 HC RX 250 WO HCPCS: Performed by: NURSE ANESTHETIST, CERTIFIED REGISTERED

## 2021-12-06 PROCEDURE — 82962 GLUCOSE BLOOD TEST: CPT

## 2021-12-06 PROCEDURE — 3600000014 HC SURGERY LEVEL 4 ADDTL 15MIN: Performed by: PODIATRIST

## 2021-12-06 PROCEDURE — 6370000000 HC RX 637 (ALT 250 FOR IP): Performed by: FAMILY MEDICINE

## 2021-12-06 PROCEDURE — 2580000003 HC RX 258

## 2021-12-06 PROCEDURE — 7100000000 HC PACU RECOVERY - FIRST 15 MIN: Performed by: PODIATRIST

## 2021-12-06 PROCEDURE — 88304 TISSUE EXAM BY PATHOLOGIST: CPT

## 2021-12-06 PROCEDURE — 2500000003 HC RX 250 WO HCPCS: Performed by: PODIATRIST

## 2021-12-06 PROCEDURE — G0378 HOSPITAL OBSERVATION PER HR: HCPCS

## 2021-12-06 PROCEDURE — 2580000003 HC RX 258: Performed by: PODIATRIST

## 2021-12-06 PROCEDURE — 3700000001 HC ADD 15 MINUTES (ANESTHESIA): Performed by: PODIATRIST

## 2021-12-06 PROCEDURE — 7100000001 HC PACU RECOVERY - ADDTL 15 MIN: Performed by: PODIATRIST

## 2021-12-06 PROCEDURE — 2580000003 HC RX 258: Performed by: ANESTHESIOLOGY

## 2021-12-06 PROCEDURE — 2720000002 HC MISC SURG SUPPLY STERILE >$500: Performed by: PODIATRIST

## 2021-12-06 PROCEDURE — 2709999900 HC NON-CHARGEABLE SUPPLY: Performed by: PODIATRIST

## 2021-12-06 PROCEDURE — 6360000002 HC RX W HCPCS: Performed by: PODIATRIST

## 2021-12-06 PROCEDURE — 6360000002 HC RX W HCPCS: Performed by: ANESTHESIOLOGY

## 2021-12-06 PROCEDURE — 3700000000 HC ANESTHESIA ATTENDED CARE: Performed by: PODIATRIST

## 2021-12-06 PROCEDURE — 6370000000 HC RX 637 (ALT 250 FOR IP): Performed by: PODIATRIST

## 2021-12-06 PROCEDURE — 88300 SURGICAL PATH GROSS: CPT

## 2021-12-06 PROCEDURE — 3600000004 HC SURGERY LEVEL 4 BASE: Performed by: PODIATRIST

## 2021-12-06 DEVICE — SCREW BNE L28MM DIA3.5MM CORT S STL ST NONCANNULATED LOK: Type: IMPLANTABLE DEVICE | Status: FUNCTIONAL

## 2021-12-06 DEVICE — SCREW BNE L60MM DIA4MM CANC S STL ST CANN NONLOCKING FULL: Type: IMPLANTABLE DEVICE | Status: FUNCTIONAL

## 2021-12-06 DEVICE — IMPLANTABLE DEVICE: Type: IMPLANTABLE DEVICE | Site: FOOT | Status: FUNCTIONAL

## 2021-12-06 DEVICE — SCREW, FT, Ø6.7X65.0 MM
Type: IMPLANTABLE DEVICE | Site: FOOT | Status: FUNCTIONAL
Brand: COLAG™

## 2021-12-06 DEVICE — SCREW BNE L32MM DIA3.5MM CORT S STL ST NONCANNULATED LOK: Type: IMPLANTABLE DEVICE | Status: FUNCTIONAL

## 2021-12-06 DEVICE — SCREW, Ø6.7X45.0 MM
Type: IMPLANTABLE DEVICE | Site: FOOT | Status: FUNCTIONAL
Brand: COLAG™

## 2021-12-06 DEVICE — PLATE BNE L81MM 7 H S STL 1/3 TBLR LOK COMPR W/ CLLR FOR: Type: IMPLANTABLE DEVICE | Status: FUNCTIONAL

## 2021-12-06 RX ORDER — SODIUM CHLORIDE 0.9 % (FLUSH) 0.9 %
5-40 SYRINGE (ML) INJECTION EVERY 12 HOURS SCHEDULED
Status: DISCONTINUED | OUTPATIENT
Start: 2021-12-06 | End: 2021-12-09 | Stop reason: HOSPADM

## 2021-12-06 RX ORDER — DEXAMETHASONE SODIUM PHOSPHATE 4 MG/ML
INJECTION, SOLUTION INTRA-ARTICULAR; INTRALESIONAL; INTRAMUSCULAR; INTRAVENOUS; SOFT TISSUE PRN
Status: DISCONTINUED | OUTPATIENT
Start: 2021-12-06 | End: 2021-12-06 | Stop reason: SDUPTHER

## 2021-12-06 RX ORDER — LAMOTRIGINE 100 MG/1
100 TABLET ORAL NIGHTLY
Status: DISCONTINUED | OUTPATIENT
Start: 2021-12-06 | End: 2021-12-09 | Stop reason: HOSPADM

## 2021-12-06 RX ORDER — TOPIRAMATE 25 MG/1
25 TABLET ORAL 2 TIMES DAILY
Status: DISCONTINUED | OUTPATIENT
Start: 2021-12-06 | End: 2021-12-09 | Stop reason: HOSPADM

## 2021-12-06 RX ORDER — SODIUM CHLORIDE 0.9 % (FLUSH) 0.9 %
5-40 SYRINGE (ML) INJECTION PRN
Status: DISCONTINUED | OUTPATIENT
Start: 2021-12-06 | End: 2021-12-09 | Stop reason: HOSPADM

## 2021-12-06 RX ORDER — ALPRAZOLAM 0.25 MG/1
0.5 TABLET ORAL 4 TIMES DAILY PRN
Status: DISCONTINUED | OUTPATIENT
Start: 2021-12-06 | End: 2021-12-09 | Stop reason: HOSPADM

## 2021-12-06 RX ORDER — NICOTINE POLACRILEX 4 MG
15 LOZENGE BUCCAL PRN
Status: DISCONTINUED | OUTPATIENT
Start: 2021-12-06 | End: 2021-12-09 | Stop reason: HOSPADM

## 2021-12-06 RX ORDER — SODIUM CHLORIDE 9 MG/ML
25 INJECTION, SOLUTION INTRAVENOUS PRN
Status: DISCONTINUED | OUTPATIENT
Start: 2021-12-06 | End: 2021-12-09 | Stop reason: HOSPADM

## 2021-12-06 RX ORDER — POLYETHYLENE GLYCOL 3350 17 G/17G
17 POWDER, FOR SOLUTION ORAL DAILY PRN
Status: DISCONTINUED | OUTPATIENT
Start: 2021-12-06 | End: 2021-12-09 | Stop reason: HOSPADM

## 2021-12-06 RX ORDER — ACETAMINOPHEN 325 MG/1
650 TABLET ORAL EVERY 6 HOURS PRN
Status: DISCONTINUED | OUTPATIENT
Start: 2021-12-06 | End: 2021-12-09 | Stop reason: HOSPADM

## 2021-12-06 RX ORDER — ONDANSETRON 2 MG/ML
INJECTION INTRAMUSCULAR; INTRAVENOUS PRN
Status: DISCONTINUED | OUTPATIENT
Start: 2021-12-06 | End: 2021-12-06 | Stop reason: SDUPTHER

## 2021-12-06 RX ORDER — DEXTROSE MONOHYDRATE 50 MG/ML
100 INJECTION, SOLUTION INTRAVENOUS PRN
Status: DISCONTINUED | OUTPATIENT
Start: 2021-12-06 | End: 2021-12-09 | Stop reason: HOSPADM

## 2021-12-06 RX ORDER — SUCRALFATE 1 G/1
1 TABLET ORAL EVERY 6 HOURS PRN
Status: DISCONTINUED | OUTPATIENT
Start: 2021-12-06 | End: 2021-12-09 | Stop reason: HOSPADM

## 2021-12-06 RX ORDER — ONDANSETRON 2 MG/ML
4 INJECTION INTRAMUSCULAR; INTRAVENOUS EVERY 6 HOURS PRN
Status: DISCONTINUED | OUTPATIENT
Start: 2021-12-06 | End: 2021-12-09 | Stop reason: HOSPADM

## 2021-12-06 RX ORDER — GLYCOPYRROLATE 1 MG/5 ML
SYRINGE (ML) INTRAVENOUS PRN
Status: DISCONTINUED | OUTPATIENT
Start: 2021-12-06 | End: 2021-12-06 | Stop reason: SDUPTHER

## 2021-12-06 RX ORDER — CEFAZOLIN SODIUM 1 G/3ML
INJECTION, POWDER, FOR SOLUTION INTRAMUSCULAR; INTRAVENOUS PRN
Status: DISCONTINUED | OUTPATIENT
Start: 2021-12-06 | End: 2021-12-06 | Stop reason: SDUPTHER

## 2021-12-06 RX ORDER — ALBUTEROL SULFATE 90 UG/1
2 AEROSOL, METERED RESPIRATORY (INHALATION) EVERY 6 HOURS PRN
Status: DISCONTINUED | OUTPATIENT
Start: 2021-12-06 | End: 2021-12-06 | Stop reason: CLARIF

## 2021-12-06 RX ORDER — ONDANSETRON 4 MG/1
4 TABLET, ORALLY DISINTEGRATING ORAL EVERY 8 HOURS PRN
Status: DISCONTINUED | OUTPATIENT
Start: 2021-12-06 | End: 2021-12-09 | Stop reason: HOSPADM

## 2021-12-06 RX ORDER — SODIUM CHLORIDE 0.9 % (FLUSH) 0.9 %
5-40 SYRINGE (ML) INJECTION EVERY 12 HOURS SCHEDULED
Status: DISCONTINUED | OUTPATIENT
Start: 2021-12-06 | End: 2021-12-06 | Stop reason: SDUPTHER

## 2021-12-06 RX ORDER — PROMETHAZINE HYDROCHLORIDE 25 MG/ML
6.25 INJECTION, SOLUTION INTRAMUSCULAR; INTRAVENOUS
Status: DISCONTINUED | OUTPATIENT
Start: 2021-12-06 | End: 2021-12-06 | Stop reason: HOSPADM

## 2021-12-06 RX ORDER — ROCURONIUM BROMIDE 10 MG/ML
INJECTION, SOLUTION INTRAVENOUS PRN
Status: DISCONTINUED | OUTPATIENT
Start: 2021-12-06 | End: 2021-12-06 | Stop reason: SDUPTHER

## 2021-12-06 RX ORDER — LANOLIN ALCOHOL/MO/W.PET/CERES
1000 CREAM (GRAM) TOPICAL DAILY
Status: DISCONTINUED | OUTPATIENT
Start: 2021-12-07 | End: 2021-12-09 | Stop reason: HOSPADM

## 2021-12-06 RX ORDER — SODIUM CHLORIDE 9 MG/ML
25 INJECTION, SOLUTION INTRAVENOUS PRN
Status: DISCONTINUED | OUTPATIENT
Start: 2021-12-06 | End: 2021-12-06 | Stop reason: SDUPTHER

## 2021-12-06 RX ORDER — ALBUTEROL SULFATE 2.5 MG/3ML
2.5 SOLUTION RESPIRATORY (INHALATION) EVERY 6 HOURS PRN
Status: DISCONTINUED | OUTPATIENT
Start: 2021-12-06 | End: 2021-12-09 | Stop reason: HOSPADM

## 2021-12-06 RX ORDER — LACTULOSE 10 G/15ML
10 SOLUTION ORAL DAILY
Status: DISCONTINUED | OUTPATIENT
Start: 2021-12-07 | End: 2021-12-09 | Stop reason: HOSPADM

## 2021-12-06 RX ORDER — ONDANSETRON 2 MG/ML
4 INJECTION INTRAMUSCULAR; INTRAVENOUS
Status: DISCONTINUED | OUTPATIENT
Start: 2021-12-06 | End: 2021-12-06 | Stop reason: HOSPADM

## 2021-12-06 RX ORDER — NEOSTIGMINE METHYLSULFATE 1 MG/ML
INJECTION, SOLUTION INTRAVENOUS PRN
Status: DISCONTINUED | OUTPATIENT
Start: 2021-12-06 | End: 2021-12-06 | Stop reason: SDUPTHER

## 2021-12-06 RX ORDER — ESTRADIOL 0.1 MG/D
1 FILM, EXTENDED RELEASE TRANSDERMAL
Status: DISCONTINUED | OUTPATIENT
Start: 2021-12-08 | End: 2021-12-09 | Stop reason: HOSPADM

## 2021-12-06 RX ORDER — TRAMADOL HYDROCHLORIDE 50 MG/1
50 TABLET ORAL EVERY 6 HOURS PRN
Status: DISCONTINUED | OUTPATIENT
Start: 2021-12-06 | End: 2021-12-09 | Stop reason: HOSPADM

## 2021-12-06 RX ORDER — PROPOFOL 10 MG/ML
INJECTION, EMULSION INTRAVENOUS PRN
Status: DISCONTINUED | OUTPATIENT
Start: 2021-12-06 | End: 2021-12-06 | Stop reason: SDUPTHER

## 2021-12-06 RX ORDER — SUCCINYLCHOLINE/SOD CL,ISO/PF 200MG/10ML
SYRINGE (ML) INTRAVENOUS PRN
Status: DISCONTINUED | OUTPATIENT
Start: 2021-12-06 | End: 2021-12-06 | Stop reason: SDUPTHER

## 2021-12-06 RX ORDER — ONDANSETRON 4 MG/1
4 TABLET, ORALLY DISINTEGRATING ORAL EVERY 8 HOURS PRN
Status: CANCELLED | OUTPATIENT
Start: 2021-12-06

## 2021-12-06 RX ORDER — LIDOCAINE 4 G/G
1 PATCH TOPICAL DAILY
Status: DISCONTINUED | OUTPATIENT
Start: 2021-12-06 | End: 2021-12-09 | Stop reason: HOSPADM

## 2021-12-06 RX ORDER — LIDOCAINE HYDROCHLORIDE 20 MG/ML
INJECTION, SOLUTION EPIDURAL; INFILTRATION; INTRACAUDAL; PERINEURAL PRN
Status: DISCONTINUED | OUTPATIENT
Start: 2021-12-06 | End: 2021-12-06 | Stop reason: SDUPTHER

## 2021-12-06 RX ORDER — BENZTROPINE MESYLATE 2 MG/1
2 TABLET ORAL 2 TIMES DAILY
Status: DISCONTINUED | OUTPATIENT
Start: 2021-12-06 | End: 2021-12-09 | Stop reason: HOSPADM

## 2021-12-06 RX ORDER — CLOZAPINE 100 MG/1
300 TABLET ORAL NIGHTLY
Status: DISCONTINUED | OUTPATIENT
Start: 2021-12-06 | End: 2021-12-09 | Stop reason: HOSPADM

## 2021-12-06 RX ORDER — BUPIVACAINE HYDROCHLORIDE 5 MG/ML
INJECTION, SOLUTION EPIDURAL; INTRACAUDAL PRN
Status: DISCONTINUED | OUTPATIENT
Start: 2021-12-06 | End: 2021-12-06 | Stop reason: ALTCHOICE

## 2021-12-06 RX ORDER — ACETAMINOPHEN 650 MG/1
650 SUPPOSITORY RECTAL EVERY 6 HOURS PRN
Status: DISCONTINUED | OUTPATIENT
Start: 2021-12-06 | End: 2021-12-09 | Stop reason: HOSPADM

## 2021-12-06 RX ORDER — POLYETHYLENE GLYCOL 3350 17 G/17G
17 POWDER, FOR SOLUTION ORAL 2 TIMES DAILY
Status: DISCONTINUED | OUTPATIENT
Start: 2021-12-06 | End: 2021-12-09 | Stop reason: HOSPADM

## 2021-12-06 RX ORDER — OXYCODONE HYDROCHLORIDE 5 MG/1
10 TABLET ORAL EVERY 6 HOURS PRN
Status: DISCONTINUED | OUTPATIENT
Start: 2021-12-06 | End: 2021-12-07

## 2021-12-06 RX ORDER — OMEPRAZOLE 20 MG/1
20 CAPSULE, DELAYED RELEASE ORAL NIGHTLY
Status: CANCELLED | OUTPATIENT
Start: 2021-12-06

## 2021-12-06 RX ORDER — SODIUM CHLORIDE 0.9 % (FLUSH) 0.9 %
5-40 SYRINGE (ML) INJECTION PRN
Status: DISCONTINUED | OUTPATIENT
Start: 2021-12-06 | End: 2021-12-06 | Stop reason: SDUPTHER

## 2021-12-06 RX ORDER — MIDAZOLAM HYDROCHLORIDE 1 MG/ML
INJECTION INTRAMUSCULAR; INTRAVENOUS PRN
Status: DISCONTINUED | OUTPATIENT
Start: 2021-12-06 | End: 2021-12-06 | Stop reason: SDUPTHER

## 2021-12-06 RX ORDER — FAMOTIDINE 20 MG/1
20 TABLET, FILM COATED ORAL 2 TIMES DAILY
Status: DISCONTINUED | OUTPATIENT
Start: 2021-12-06 | End: 2021-12-09 | Stop reason: HOSPADM

## 2021-12-06 RX ORDER — OXYCODONE HCL 10 MG/1
10 TABLET, FILM COATED, EXTENDED RELEASE ORAL EVERY 12 HOURS SCHEDULED
Status: DISCONTINUED | OUTPATIENT
Start: 2021-12-06 | End: 2021-12-09 | Stop reason: HOSPADM

## 2021-12-06 RX ORDER — FENTANYL CITRATE 50 UG/ML
INJECTION, SOLUTION INTRAMUSCULAR; INTRAVENOUS PRN
Status: DISCONTINUED | OUTPATIENT
Start: 2021-12-06 | End: 2021-12-06 | Stop reason: SDUPTHER

## 2021-12-06 RX ORDER — SCOLOPAMINE TRANSDERMAL SYSTEM 1 MG/1
1 PATCH, EXTENDED RELEASE TRANSDERMAL
Status: DISCONTINUED | OUTPATIENT
Start: 2021-12-06 | End: 2021-12-09 | Stop reason: HOSPADM

## 2021-12-06 RX ORDER — SODIUM CHLORIDE 9 MG/ML
INJECTION, SOLUTION INTRAVENOUS CONTINUOUS PRN
Status: DISCONTINUED | OUTPATIENT
Start: 2021-12-06 | End: 2021-12-06 | Stop reason: SDUPTHER

## 2021-12-06 RX ORDER — DEXTROSE MONOHYDRATE 25 G/50ML
12.5 INJECTION, SOLUTION INTRAVENOUS PRN
Status: DISCONTINUED | OUTPATIENT
Start: 2021-12-06 | End: 2021-12-09 | Stop reason: HOSPADM

## 2021-12-06 RX ORDER — LEVOTHYROXINE SODIUM 0.1 MG/1
100 TABLET ORAL DAILY
Status: DISCONTINUED | OUTPATIENT
Start: 2021-12-07 | End: 2021-12-09 | Stop reason: HOSPADM

## 2021-12-06 RX ORDER — ONDANSETRON 2 MG/ML
4 INJECTION INTRAMUSCULAR; INTRAVENOUS EVERY 6 HOURS PRN
Status: CANCELLED | OUTPATIENT
Start: 2021-12-06

## 2021-12-06 RX ADMIN — ONDANSETRON 4 MG: 2 INJECTION INTRAMUSCULAR; INTRAVENOUS at 12:42

## 2021-12-06 RX ADMIN — POLYETHYLENE GLYCOL 3350 17 G: 17 POWDER, FOR SOLUTION ORAL at 21:45

## 2021-12-06 RX ADMIN — FENTANYL CITRATE 50 MCG: 50 INJECTION, SOLUTION INTRAMUSCULAR; INTRAVENOUS at 12:55

## 2021-12-06 RX ADMIN — PROPOFOL 200 MG: 10 INJECTION, EMULSION INTRAVENOUS at 12:29

## 2021-12-06 RX ADMIN — TOPIRAMATE 25 MG: 25 TABLET, FILM COATED ORAL at 21:44

## 2021-12-06 RX ADMIN — RIVAROXABAN 10 MG: 10 TABLET, FILM COATED ORAL at 21:44

## 2021-12-06 RX ADMIN — FENTANYL CITRATE 50 MCG: 50 INJECTION, SOLUTION INTRAMUSCULAR; INTRAVENOUS at 14:09

## 2021-12-06 RX ADMIN — Medication 3 MG: at 15:54

## 2021-12-06 RX ADMIN — OXYCODONE HYDROCHLORIDE 10 MG: 10 TABLET, FILM COATED, EXTENDED RELEASE ORAL at 21:42

## 2021-12-06 RX ADMIN — BENZTROPINE MESYLATE 2 MG: 2 TABLET ORAL at 21:44

## 2021-12-06 RX ADMIN — Medication 10 ML: at 22:26

## 2021-12-06 RX ADMIN — SODIUM CHLORIDE, PRESERVATIVE FREE 10 ML: 5 INJECTION INTRAVENOUS at 21:51

## 2021-12-06 RX ADMIN — LIDOCAINE HYDROCHLORIDE 100 MG: 20 INJECTION, SOLUTION EPIDURAL; INFILTRATION; INTRACAUDAL; PERINEURAL at 12:29

## 2021-12-06 RX ADMIN — HYDROMORPHONE HYDROCHLORIDE 0.5 MG: 1 INJECTION, SOLUTION INTRAMUSCULAR; INTRAVENOUS; SUBCUTANEOUS at 17:04

## 2021-12-06 RX ADMIN — FAMOTIDINE 20 MG: 20 TABLET ORAL at 21:44

## 2021-12-06 RX ADMIN — ROCURONIUM BROMIDE 50 MG: 10 INJECTION, SOLUTION INTRAVENOUS at 12:42

## 2021-12-06 RX ADMIN — CEFAZOLIN 1000 MG: 1 INJECTION, POWDER, FOR SOLUTION INTRAMUSCULAR; INTRAVENOUS at 14:52

## 2021-12-06 RX ADMIN — SODIUM CHLORIDE: 9 INJECTION, SOLUTION INTRAVENOUS at 12:18

## 2021-12-06 RX ADMIN — ACETAMINOPHEN 650 MG: 325 TABLET ORAL at 21:42

## 2021-12-06 RX ADMIN — Medication 2000 MG: at 21:46

## 2021-12-06 RX ADMIN — LAMOTRIGINE 100 MG: 100 TABLET ORAL at 21:44

## 2021-12-06 RX ADMIN — Medication 0.6 MG: at 15:54

## 2021-12-06 RX ADMIN — FENTANYL CITRATE 50 MCG: 50 INJECTION, SOLUTION INTRAMUSCULAR; INTRAVENOUS at 14:55

## 2021-12-06 RX ADMIN — HYDROMORPHONE HYDROCHLORIDE 0.5 MG: 1 INJECTION, SOLUTION INTRAMUSCULAR; INTRAVENOUS; SUBCUTANEOUS at 16:20

## 2021-12-06 RX ADMIN — Medication 200 MG: at 12:29

## 2021-12-06 RX ADMIN — DEXAMETHASONE SODIUM PHOSPHATE 10 MG: 4 INJECTION, SOLUTION INTRAMUSCULAR; INTRAVENOUS at 12:42

## 2021-12-06 RX ADMIN — CLOZAPINE 300 MG: 100 TABLET ORAL at 21:44

## 2021-12-06 RX ADMIN — HYDROMORPHONE HYDROCHLORIDE 0.5 MG: 1 INJECTION, SOLUTION INTRAMUSCULAR; INTRAVENOUS; SUBCUTANEOUS at 16:15

## 2021-12-06 RX ADMIN — FENTANYL CITRATE 50 MCG: 50 INJECTION, SOLUTION INTRAMUSCULAR; INTRAVENOUS at 12:29

## 2021-12-06 RX ADMIN — HYDROMORPHONE HYDROCHLORIDE 0.5 MG: 1 INJECTION, SOLUTION INTRAMUSCULAR; INTRAVENOUS; SUBCUTANEOUS at 16:33

## 2021-12-06 RX ADMIN — Medication 2000 MG: at 12:22

## 2021-12-06 RX ADMIN — MIDAZOLAM 2 MG: 1 INJECTION INTRAMUSCULAR; INTRAVENOUS at 12:18

## 2021-12-06 RX ADMIN — HYDROMORPHONE HYDROCHLORIDE 0.5 MG: 1 INJECTION, SOLUTION INTRAMUSCULAR; INTRAVENOUS; SUBCUTANEOUS at 17:10

## 2021-12-06 ASSESSMENT — PULMONARY FUNCTION TESTS
PIF_VALUE: 23
PIF_VALUE: 24
PIF_VALUE: 8
PIF_VALUE: 23
PIF_VALUE: 24
PIF_VALUE: 23
PIF_VALUE: 9
PIF_VALUE: 24
PIF_VALUE: 3
PIF_VALUE: 4
PIF_VALUE: 20
PIF_VALUE: 2
PIF_VALUE: 5
PIF_VALUE: 5
PIF_VALUE: 24
PIF_VALUE: 3
PIF_VALUE: 6
PIF_VALUE: 25
PIF_VALUE: 5
PIF_VALUE: 4
PIF_VALUE: 24
PIF_VALUE: 4
PIF_VALUE: 24
PIF_VALUE: 3
PIF_VALUE: 4
PIF_VALUE: 23
PIF_VALUE: 24
PIF_VALUE: 4
PIF_VALUE: 24
PIF_VALUE: 5
PIF_VALUE: 0
PIF_VALUE: 3
PIF_VALUE: 24
PIF_VALUE: 1
PIF_VALUE: 3
PIF_VALUE: 3
PIF_VALUE: 23
PIF_VALUE: 1
PIF_VALUE: 24
PIF_VALUE: 1
PIF_VALUE: 23
PIF_VALUE: 7
PIF_VALUE: 1
PIF_VALUE: 24
PIF_VALUE: 24
PIF_VALUE: 25
PIF_VALUE: 23
PIF_VALUE: 1
PIF_VALUE: 3
PIF_VALUE: 24
PIF_VALUE: 24
PIF_VALUE: 3
PIF_VALUE: 6
PIF_VALUE: 26
PIF_VALUE: 18
PIF_VALUE: 24
PIF_VALUE: 25
PIF_VALUE: 24
PIF_VALUE: 3
PIF_VALUE: 3
PIF_VALUE: 8
PIF_VALUE: 4
PIF_VALUE: 4
PIF_VALUE: 3
PIF_VALUE: 3
PIF_VALUE: 4
PIF_VALUE: 1
PIF_VALUE: 24
PIF_VALUE: 3
PIF_VALUE: 22
PIF_VALUE: 23
PIF_VALUE: 24
PIF_VALUE: 4
PIF_VALUE: 15
PIF_VALUE: 3
PIF_VALUE: 24
PIF_VALUE: 3
PIF_VALUE: 4
PIF_VALUE: 4
PIF_VALUE: 15
PIF_VALUE: 3
PIF_VALUE: 1
PIF_VALUE: 4
PIF_VALUE: 3
PIF_VALUE: 4
PIF_VALUE: 24
PIF_VALUE: 25
PIF_VALUE: 3
PIF_VALUE: 4
PIF_VALUE: 6
PIF_VALUE: 24
PIF_VALUE: 24
PIF_VALUE: 4
PIF_VALUE: 24
PIF_VALUE: 25
PIF_VALUE: 4
PIF_VALUE: 4
PIF_VALUE: 23
PIF_VALUE: 24
PIF_VALUE: 17
PIF_VALUE: 23
PIF_VALUE: 24
PIF_VALUE: 3
PIF_VALUE: 24
PIF_VALUE: 3
PIF_VALUE: 4
PIF_VALUE: 23
PIF_VALUE: 24
PIF_VALUE: 1
PIF_VALUE: 4
PIF_VALUE: 3
PIF_VALUE: 4
PIF_VALUE: 4
PIF_VALUE: 23
PIF_VALUE: 24
PIF_VALUE: 2
PIF_VALUE: 5
PIF_VALUE: 23
PIF_VALUE: 25
PIF_VALUE: 3
PIF_VALUE: 25
PIF_VALUE: 4
PIF_VALUE: 1
PIF_VALUE: 3
PIF_VALUE: 24
PIF_VALUE: 3
PIF_VALUE: 23
PIF_VALUE: 24
PIF_VALUE: 4
PIF_VALUE: 24
PIF_VALUE: 15
PIF_VALUE: 4
PIF_VALUE: 23
PIF_VALUE: 24
PIF_VALUE: 3
PIF_VALUE: 23
PIF_VALUE: 3
PIF_VALUE: 24
PIF_VALUE: 4
PIF_VALUE: 7
PIF_VALUE: 3
PIF_VALUE: 3
PIF_VALUE: 24
PIF_VALUE: 23
PIF_VALUE: 24
PIF_VALUE: 4
PIF_VALUE: 6
PIF_VALUE: 24
PIF_VALUE: 3
PIF_VALUE: 23
PIF_VALUE: 23
PIF_VALUE: 3
PIF_VALUE: 4
PIF_VALUE: 3
PIF_VALUE: 24
PIF_VALUE: 4
PIF_VALUE: 24
PIF_VALUE: 4
PIF_VALUE: 4
PIF_VALUE: 15
PIF_VALUE: 1
PIF_VALUE: 4
PIF_VALUE: 3
PIF_VALUE: 24
PIF_VALUE: 23
PIF_VALUE: 4
PIF_VALUE: 25
PIF_VALUE: 3
PIF_VALUE: 3
PIF_VALUE: 25
PIF_VALUE: 24
PIF_VALUE: 25
PIF_VALUE: 24
PIF_VALUE: 23
PIF_VALUE: 4
PIF_VALUE: 3
PIF_VALUE: 24
PIF_VALUE: 24
PIF_VALUE: 3
PIF_VALUE: 4
PIF_VALUE: 3
PIF_VALUE: 15
PIF_VALUE: 4
PIF_VALUE: 24
PIF_VALUE: 24
PIF_VALUE: 17
PIF_VALUE: 24
PIF_VALUE: 4
PIF_VALUE: 4
PIF_VALUE: 22
PIF_VALUE: 23
PIF_VALUE: 3
PIF_VALUE: 23
PIF_VALUE: 4
PIF_VALUE: 20
PIF_VALUE: 24
PIF_VALUE: 4
PIF_VALUE: 1
PIF_VALUE: 24
PIF_VALUE: 4
PIF_VALUE: 23
PIF_VALUE: 24
PIF_VALUE: 3
PIF_VALUE: 3
PIF_VALUE: 25
PIF_VALUE: 24
PIF_VALUE: 3
PIF_VALUE: 4
PIF_VALUE: 3
PIF_VALUE: 5
PIF_VALUE: 3
PIF_VALUE: 4
PIF_VALUE: 23
PIF_VALUE: 4
PIF_VALUE: 3
PIF_VALUE: 8
PIF_VALUE: 4
PIF_VALUE: 23
PIF_VALUE: 3
PIF_VALUE: 4

## 2021-12-06 ASSESSMENT — PAIN SCALES - GENERAL
PAINLEVEL_OUTOF10: 8
PAINLEVEL_OUTOF10: 8
PAINLEVEL_OUTOF10: 9
PAINLEVEL_OUTOF10: 8
PAINLEVEL_OUTOF10: 7
PAINLEVEL_OUTOF10: 8
PAINLEVEL_OUTOF10: 7
PAINLEVEL_OUTOF10: 10
PAINLEVEL_OUTOF10: 8

## 2021-12-06 ASSESSMENT — PAIN - FUNCTIONAL ASSESSMENT: PAIN_FUNCTIONAL_ASSESSMENT: 0-10

## 2021-12-06 ASSESSMENT — PAIN DESCRIPTION - PAIN TYPE
TYPE: SURGICAL PAIN

## 2021-12-06 ASSESSMENT — PAIN DESCRIPTION - ORIENTATION
ORIENTATION: LEFT

## 2021-12-06 ASSESSMENT — PAIN DESCRIPTION - DESCRIPTORS
DESCRIPTORS: THROBBING;SHARP
DESCRIPTORS: SHARP;THROBBING
DESCRIPTORS: SHARP;THROBBING
DESCRIPTORS: SHARP

## 2021-12-06 ASSESSMENT — ENCOUNTER SYMPTOMS: SHORTNESS OF BREATH: 0

## 2021-12-06 ASSESSMENT — PAIN DESCRIPTION - LOCATION
LOCATION: FOOT

## 2021-12-06 ASSESSMENT — LIFESTYLE VARIABLES: SMOKING_STATUS: 0

## 2021-12-06 NOTE — PROGRESS NOTES
[facility] was ordered Vivelle Patch which is a nonformulary medication. This medication will need to be supplied by the patient as the pharmacy does not carry this non-formulary medication. If the medication has not been administered by 1400 on the following day from the time the order was placed, a pharmacist will follow-up with the nurse of the patient to assess the capability of the patient to bring in the medication. If it is determined that the patient cannot supply the medication and it is not available to be dispensed from the pharmacy, the provider will be notified. Thank Wendy Santiago Pharm. D 12/6/2021 6:50 PM

## 2021-12-06 NOTE — PROGRESS NOTES
Ms. Felix Agee is a 42year old female s/p left calcaneal osteotomy and total ankle replacement. Appreciate admission to hospitalist service for coordination of care and rehab placement.     Wound Care:  -Patient to be NWB to LLE in cast - will remain intact for 2 weeks    VTE PPx:  -Anticoagulation: Xarelto 10 mg qD and for 30 days upon discharge  -SCD to non-operative leg while in bed    Pain:  -Pain: Per primary team but recommend acetaminophen scheduled, home oxycodone with additional dosage for increased pain    Rehab:  -Please encourage incentive spirometry once every hour  -PT/OT consult placed to evaluate and treat  --Appreciate safe discharge plan upon evaluation    Antibiotics:  -Ancef while inpatient  -Please discharge of 100 mg Doxycycline BID x 14 days    Thanks for your help with her care,    Mary Cox, [address]  [phone]

## 2021-12-06 NOTE — BRIEF OP NOTE
Brief Postoperative Note      Patient: Ambreen Camarillo  YOB: 1980  MRN: [id number]    Date of Procedure: 12/6/2021    Pre-Op Diagnosis: Osteoarthritis of the left foot and ankle    Post-Op Diagnosis: Osteoarthritis of the left foot and ankle       Procedures: 1. Total ankle replacement of the left lower extremity 2. Left calcaneal slide osteotomy 3. ORIF of iatrogenic medial malleolar fracture left lower extremity    Surgeon(s):  MYKE Vance DPM    Assistant:  Resident: Darcy Looney DPM    Anesthesia: General    Estimated Blood Loss (mL): Minimal    Complications: None    Specimens:   ID Type Source Tests Collected by Time Destination   A : BONE LEFT ANKLE Specimen Ankle SURGICAL PATHOLOGY [facility] 12/6/2021 1428    B : RETAINED HARDWARE LEFT ANKLE Hardware Ankle SURGICAL PATHOLOGY [facility] 12/6/2021 1428        Implants:  Implant Name Type Inv.  Item Serial No.  Lot No. LRB No. Used Action   SCREW BNE L65MM [id number] FT FULL THRDED SELF DRL ST KAYLA LO  SCREW BNE L65MM [id number] FT FULL THRDED SELF DRL ST KAYLA LO  [id number] [place] [facility] [id number] Left 1 Implanted   SCREW BNE L45MM [id number] FT SELF DRL ST KAYLA LO PROF HD 2 LD  SCREW BNE L45MM [id number] FT SELF DRL ST KAYLA LO PROF HD 2 LD  [id number] [place] [email or web address] [id number] Left 1 Implanted   IMPL ANKLE SAL XT TALAR FLAT CUT SZ1 LT Leg/Ankle/Foot/Toe IMPL ANKLE SAL XT TALAR FLAT CUT SZ1 LT  [employer] Tellja 1263-[id number] Left 1 Implanted   TRAY TIBIALXL SZ 0 JOSE TALARIS  TRAY TIBIALXL SZ 0 JOSE TALARIS  [employer] [facility] [facility] [id number] Left 1 Implanted   INTE[employer]RA XT REVISION ANKLE RPLACEMENT SYSTEM INSERT, SIZE 01, LEFT, TH13 REF: [id number]      [id number] Left 1 Implanted   PLATE BNE [id number] 7 H S STL 1/3 TBLR KAYLA COMPR W/ CLLR FOR  PLATE BNE [id number] 7 H S STL 1/3 TBLR KAYLA COMPR W/ CLLR FOR  DEPUY SYNTHES [place]  Left 1 Implanted   SCREW BNE L28MM DIA3.5MM CARYN S STL ST NONCANNULATED KAYLA  SCREW BNE L28MM DIA3.5MM CARYN S STL ST NONCANNULATED KAYLA  DEPUY SYNTHES [place]-WD  Left 1 Implanted   SCREW BNE L32MM DIA3.5MM CARYN S STL ST NONCANNULATED KAYLA  SCREW BNE L32MM DIA3.5MM CARYN S STL ST NONCANNULATED KAYLA  DEPUY SYNTHES [place]-WD  Left 1 Implanted   SCREW BNE L60MM DIA4MM CANC S STL ST DEISY NONLOCKING FULL  SCREW BNE L60MM DIA4MM CANC S STL ST DEISY NONLOCKING FULL  DEPUY SYNTH[employer] [place]  Left 1 Implanted         Drains: * No LDAs found *    Findings: see operative report    Electronically signed by Eleanor Andersen DPM on 12/6/2021 at 4:08 PM

## 2021-12-06 NOTE — ANESTHESIA PRE PROCEDURE
Department of Anesthesiology  Preprocedure Note       Name:  Carmen Mclean   Age:  42 y.o.  :  1980                                          MRN:  [id number]         Date:  2021      Surgeon: Cheikh Blair):  Jatinder Serrato DPM    Procedure: Procedure(s):  LEFT ANKLE FUSION TAKEDOWN LEFT TOTAL ANKLE REPLACEMENT  POSSIBLE ENDOSCOPIC GASTROCNEMIUS RECESSION POSSIBLE LEFT CALCANEAL OSTEOTOMY   ++SOTELO AND NEPHEW - IN 2 BONES++     ++LATEX ALLERGY, IODINE ALLERGY++    Medications prior to admission:   Prior to Admission medications    Medication Sig Start Date End Date Taking? Authorizing Provider   oxyCODONE HCl (OXY-IR) 10 MG immediate release tablet Take 10 mg by mouth daily. Yes Historical Provider, MD   topiramate (TOPAMAX) 25 MG tablet Take 25 mg by mouth 2 times daily   Yes Historical Provider, MD   polyethylene glycol (MIRALAX) 17 g PACK packet Take 17 g by mouth 2 times daily   Yes Historical Provider, MD   lactulose (CEPHULAC) 10 g packet Take 10 g by mouth daily   Yes Historical Provider, MD   sucralfate (CARAFATE) 1 GM/10ML suspension Take 1 g by mouth 4 times daily As needed   Yes Historical Provider, MD   lamoTRIgine (LAMICTAL) 100 MG tablet take 1 tablet by mouth at bedtime 21  Yes Historical Provider, MD   omeprazole (PRILOSEC) 20 MG delayed release capsule nightly  21  Yes Historical Provider, MD   oxyCODONE ER (XTAMPZA ER) 13.5 MG C12A Take by mouth 2 times daily.     Yes Historical Provider, MD   lidocaine ( LIDOCAINE PATCH) 4 % external patch Place 1 patch onto the skin daily   Yes Historical Provider, MD   tiZANidine (ZANAFLEX) 2 MG tablet Take 2 mg by mouth 3 times daily   Yes Historical Provider, MD   acarbose (PRECOSE) 100 MG tablet Take 100 mg by mouth 4 times daily  19  Yes Historical Provider, MD   albuterol sulfate HFA (PROVENTIL HFA) 108 (90 Base) MCG/ACT inhaler Inhale 2 puffs into the lungs every 6 hours as needed  18  Yes Historical Provider, MD IV \"a red line started going up towards my heart\"       Problem List:    Patient Active Problem List   Diagnosis Code    Fracture dislocation of toe joint, left, closed, initial encounter S92.912A    Dysplastic Nevus of back D48.5       Past Medical History:        Diagnosis Date    Agoraphobia     Arthritis     Asthma     Atypical nevi     SEV ATN L flank , Mod to Sev ATN L mid back     Bipolar 1 disorder ([address]     Borderline personality disorder ([address]     Claustrophobia     Dissociative disorder     Fibromyalgia     Generalized anxiety disorder     Hidradenitis suppurativa     usually axillas    Hypoglycemia     IBS (irritable bowel syndrome)     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     SCHEDULED   FOR THE SURGERY ON 21    Osteopenia     Other manic episodes ([address]     Paranoid schizophrenia ([address]     PONV (postoperative nausea and vomiting)     Seizure ([address]     if sugar drops    Severe depression ([address]     Thyroid disease        Past Surgical History:        Procedure Laterality Date    ANKLE SURGERY Left 2009    CARPAL TUNNEL RELEASE Left      SECTION  2004    ENDOMETRIAL ABLATION  2009    Novasure    FOOT FRACTURE SURGERY Left 2019    REMOVAL PAINFUL FIXATION THIRD TOE LEFT FOOT performed by Chris Cabrera DPM at [facility]       x3    [address]      left abdomen    HYSTERECTOMY  2009    KNEE ARTHROSCOPY Left     KNEE ARTHROSCOPY Right     x 2 in 2014    LEG TENDON SURGERY Left     OTHER SURGICAL HISTORY  2009    staples removed from hernia    OTHER SURGICAL HISTORY Left 2020    dr escalera- excision dysplactis lesionx2 left flank and back    OVARY REMOVAL  2006    ROTATOR CUFF REPAIR Right 1997    ROTATOR CUFF REPAIR Right     revision    SHOULDER ARTHROSCOPY Right     TEMPOROMANDIBULAR JOINT SURGERY Left     TEMPOROMANDIBULAR JOINT SURGERY Bilateral   TUBAL LIGATION  2004    VAGAL NERVE STIMULATION      2007 battery, removed     WISDOM TOOTH EXTRACTION         Social History:    Social History     Tobacco Use    Smoking status: Former Smoker     Packs/day: 1.00     Years: 28.00     Pack years: 28.00     Types: Cigarettes     Start date:      Quit date: 2020     Years since quittin.2    Smokeless tobacco: Never Used    Tobacco comment: smoked on and off for the 28 years cigarettes and cigars   Substance Use Topics    Alcohol use: Not Currently     Comment: sober since                                 Counseling given: Not Answered  Comment: smoked on and off for the 28 years cigarettes and cigars      Vital Signs (Current):   Vitals:    21 1602 21 1038   BP:  129/66   Pulse:  81   Resp:  16   Temp:  35.8 °C (96.5 °F)   TempSrc:  Temporal   SpO2:  97%   Weight: 224 lb (101.6 kg)    Height: 5' 6\" (1.676 m)                                               BP Readings from Last 3 Encounters:   21 129/66   21 (!) 140/101   21 129/78       NPO Status: Time of last liquid consumption:                         Time of last solid consumption:                         Date of last liquid consumption: 21                        Date of last solid food consumption: 21    BMI:   Wt Readings from Last 3 Encounters:   21 224 lb (101.6 kg)   21 224 lb (101.6 kg)   21 224 lb (101.6 kg)     Body mass index is 36.15 kg/m².     CBC:   Lab Results   Component Value Date    WBC 9.5 2021    WBC 7.9 10/11/2020    RBC 4.67 2021    HGB 13.5 2021    HCT 40.5 2021    MCV 86.8 2021    RDW 14.0 2021     2021       CMP:   Lab Results   Component Value Date     2021    K 4.7 2021    K 4.2 10/11/2020     2021    CO2 27 2021    BUN 13 2021    CREATININE 0.62 2021    GFRAA >60 10/11/2020    LABGLOM >60 10/11/2020    GLUCOSE 94 08/06/2021    CALCIUM 9.4 08/06/2021       POC Tests: No results for input(s): POCGLU, POCNA, POCK, POCCL, POCBUN, POCHEMO, POCHCT in the last 72 hours. Coags: No results found for: PROTIME, INR, APTT    HCG (If Applicable): No results found for: PREGTESTUR, PREGSERUM, HCG, HCGQUANT     ABGs: No results found for: PHART, PO2ART, YCZ6DUC, JAN2EUX, BEART, A7ITTAAI     Type & Screen (If Applicable):  No results found for: LABABO, LABRH    Drug/Infectious Status (If Applicable):  No results found for: HIV, HEPCAB    COVID-19 Screening (If Applicable): No results found for: COVID19    EKG 8/6/21  SINUS RHYTHM  Compared to ECG 07/10/2005 13:14:45  Inferior Q waves no longer present  Q waves no longer present    Anesthesia Evaluation  Patient summary reviewed and Nursing notes reviewed   history of anesthetic complications: PONV. Airway: Mallampati: III  TM distance: >3 FB   Neck ROM: full  Mouth opening: > = 3 FB Dental:      Comment: Upper and lower crowns    Pulmonary: breath sounds clear to auscultation  (+) pneumonia: resolved,  asthma: seasonal asthma,     (-) shortness of breath (last used a few days ago per pt) and not a current smoker                           Cardiovascular:  Exercise tolerance: poor (<4 METS), D/t asthma per pt        ECG reviewed  Rhythm: regular  Rate: normal           Beta Blocker:  Not on Beta Blocker         Neuro/Psych:   (+) seizures (with blood sugar drop): well controlled, neuromuscular disease:, headaches: migraine headaches, psychiatric history (Bipolar 1 disorder):depression/anxiety              ROS comment: Fibromyalgia  Dissociative disorder  Last seizure 7 yrs ago. GI/Hepatic/Renal:   (+) hiatal hernia, GERD: well controlled, liver disease (liver lesions):,          ROS comment: Atypical nevi  Obesity. Endo/Other:    (+) hypothyroidism, blood dyscrasia: anemia, arthritis: OA., malignancy/cancer (breast mass per patient).                  Abdominal: Vascular: negative vascular ROS. Other Findings:           Anesthesia Plan      general     ASA 3     (20g PIV L arm)  Induction: intravenous. MIPS: Postoperative opioids intended and Prophylactic antiemetics administered. Anesthetic plan and risks discussed with patient and father. Use of blood products discussed with patient and father whom consented to blood products. Plan discussed with CRNA and attending. William Galo RN   12/6/2021      DOS STAFF ADDENDUM:    Pt seen and examined, physical exam updated, chart reviewed including anesthesia, drug and allergy history. H&P reviewed. No interval changes to history or physical examination (unless noted above). NPO status confirmed. Anesthetic plan, risks, benefits, alternatives discussed with patient. Patient verbalized an understanding and agrees to proceed. Cha Singh DO  Staff Anesthesiologist  11:46 AM          Patient seen and chart reviewed. No interval change in history or exam.   Anesthesia plan discussed, risk/benefits addressed. Patient's concerns and questions answered.      LE Hankins - TSERING  December 6, 2021  12:09 PM

## 2021-12-06 NOTE — PROGRESS NOTES
Nursing Transfer Note    Data:  Summary of patients progress: Dr George Decent Total ankle replacement of the left lower extremity  Left calcaneal slide osteotomy  ORIF of iatrogenic medial malleolar fracture left lower extremity  Reason for transfer: next level of care    Action:  Explained reason for transfer to Patient and Family. Report given to: Juan Aguero, using RN Handoff Navigator.   Mode of transportation: bed    Response:  RN Recommendations: pain mgt

## 2021-12-07 LAB
ANION GAP SERPL CALCULATED.3IONS-SCNC: 11 MMOL/L (ref 7–16)
BASOPHILS ABSOLUTE: 0.03 E9/L (ref 0–0.2)
BASOPHILS RELATIVE PERCENT: 0.3 % (ref 0–2)
BUN BLDV-MCNC: 10 MG/DL (ref 6–20)
CALCIUM SERPL-MCNC: 7.8 MG/DL (ref 8.6–10.2)
CHLORIDE BLD-SCNC: 103 MMOL/L (ref 98–107)
CO2: 19 MMOL/L (ref 22–29)
CREAT SERPL-MCNC: 0.7 MG/DL (ref 0.5–1)
EOSINOPHILS ABSOLUTE: 0.01 E9/L (ref 0.05–0.5)
EOSINOPHILS RELATIVE PERCENT: 0.1 % (ref 0–6)
GFR AFRICAN AMERICAN: >60
GFR NON-AFRICAN AMERICAN: >60 ML/MIN/1.73
GLUCOSE BLD-MCNC: 137 MG/DL (ref 74–99)
HBA1C MFR BLD: 5 % (ref 4–5.6)
HCT VFR BLD CALC: 38.4 % (ref 34–48)
HEMOGLOBIN: 12.2 G/DL (ref 11.5–15.5)
IMMATURE GRANULOCYTES #: 0.05 E9/L
IMMATURE GRANULOCYTES %: 0.4 % (ref 0–5)
LYMPHOCYTES ABSOLUTE: 1.37 E9/L (ref 1.5–4)
LYMPHOCYTES RELATIVE PERCENT: 12.3 % (ref 20–42)
MCH RBC QN AUTO: 27.5 PG (ref 26–35)
MCHC RBC AUTO-ENTMCNC: 31.8 % (ref 32–34.5)
MCV RBC AUTO: 86.7 FL (ref 80–99.9)
METER GLUCOSE: 107 MG/DL (ref 74–99)
METER GLUCOSE: 113 MG/DL (ref 74–99)
METER GLUCOSE: 118 MG/DL (ref 74–99)
METER GLUCOSE: 98 MG/DL (ref 74–99)
MONOCYTES ABSOLUTE: 0.94 E9/L (ref 0.1–0.95)
MONOCYTES RELATIVE PERCENT: 8.4 % (ref 2–12)
NEUTROPHILS ABSOLUTE: 8.75 E9/L (ref 1.8–7.3)
NEUTROPHILS RELATIVE PERCENT: 78.5 % (ref 43–80)
PDW BLD-RTO: 14.6 FL (ref 11.5–15)
PLATELET # BLD: 369 E9/L (ref 130–450)
PMV BLD AUTO: 9.3 FL (ref 7–12)
POTASSIUM REFLEX MAGNESIUM: 3.6 MMOL/L (ref 3.5–5)
RBC # BLD: 4.43 E12/L (ref 3.5–5.5)
SODIUM BLD-SCNC: 133 MMOL/L (ref 132–146)
WBC # BLD: 11.2 E9/L (ref 4.5–11.5)

## 2021-12-07 PROCEDURE — 85025 COMPLETE CBC W/AUTO DIFF WBC: CPT

## 2021-12-07 PROCEDURE — 6360000002 HC RX W HCPCS: Performed by: PODIATRIST

## 2021-12-07 PROCEDURE — 6370000000 HC RX 637 (ALT 250 FOR IP): Performed by: PODIATRIST

## 2021-12-07 PROCEDURE — 36415 COLL VENOUS BLD VENIPUNCTURE: CPT

## 2021-12-07 PROCEDURE — 80048 BASIC METABOLIC PNL TOTAL CA: CPT

## 2021-12-07 PROCEDURE — G0378 HOSPITAL OBSERVATION PER HR: HCPCS

## 2021-12-07 PROCEDURE — 6370000000 HC RX 637 (ALT 250 FOR IP): Performed by: INTERNAL MEDICINE

## 2021-12-07 PROCEDURE — 97165 OT EVAL LOW COMPLEX 30 MIN: CPT

## 2021-12-07 PROCEDURE — 96374 THER/PROPH/DIAG INJ IV PUSH: CPT

## 2021-12-07 PROCEDURE — 96376 TX/PRO/DX INJ SAME DRUG ADON: CPT

## 2021-12-07 PROCEDURE — 2580000003 HC RX 258: Performed by: ANESTHESIOLOGY

## 2021-12-07 PROCEDURE — 2580000003 HC RX 258: Performed by: PODIATRIST

## 2021-12-07 PROCEDURE — 99226 PR SBSQ OBSERVATION CARE/DAY 35 MINUTES: CPT | Performed by: INTERNAL MEDICINE

## 2021-12-07 PROCEDURE — 82962 GLUCOSE BLOOD TEST: CPT

## 2021-12-07 PROCEDURE — 6370000000 HC RX 637 (ALT 250 FOR IP): Performed by: FAMILY MEDICINE

## 2021-12-07 PROCEDURE — 83036 HEMOGLOBIN GLYCOSYLATED A1C: CPT

## 2021-12-07 PROCEDURE — 97161 PT EVAL LOW COMPLEX 20 MIN: CPT

## 2021-12-07 RX ORDER — OXYCODONE HYDROCHLORIDE 5 MG/1
10 TABLET ORAL EVERY 4 HOURS PRN
Status: COMPLETED | OUTPATIENT
Start: 2021-12-07 | End: 2021-12-08

## 2021-12-07 RX ADMIN — ACETAMINOPHEN 650 MG: 325 TABLET ORAL at 08:24

## 2021-12-07 RX ADMIN — CLOZAPINE 300 MG: 100 TABLET ORAL at 21:33

## 2021-12-07 RX ADMIN — LAMOTRIGINE 100 MG: 100 TABLET ORAL at 21:34

## 2021-12-07 RX ADMIN — POLYETHYLENE GLYCOL 3350 17 G: 17 POWDER, FOR SOLUTION ORAL at 08:22

## 2021-12-07 RX ADMIN — LEVOTHYROXINE SODIUM 100 MCG: 100 TABLET ORAL at 08:24

## 2021-12-07 RX ADMIN — OXYCODONE 10 MG: 5 TABLET ORAL at 16:06

## 2021-12-07 RX ADMIN — OXYCODONE 10 MG: 5 TABLET ORAL at 20:14

## 2021-12-07 RX ADMIN — LACTULOSE 10 G: 20 SOLUTION ORAL at 08:23

## 2021-12-07 RX ADMIN — Medication 10 ML: at 22:57

## 2021-12-07 RX ADMIN — OXYCODONE 10 MG: 5 TABLET ORAL at 03:18

## 2021-12-07 RX ADMIN — Medication 2000 MG: at 06:50

## 2021-12-07 RX ADMIN — OXYCODONE 10 MG: 5 TABLET ORAL at 11:43

## 2021-12-07 RX ADMIN — FAMOTIDINE 20 MG: 20 TABLET ORAL at 08:24

## 2021-12-07 RX ADMIN — SERTRALINE 150 MG: 100 TABLET, FILM COATED ORAL at 08:24

## 2021-12-07 RX ADMIN — CYANOCOBALAMIN TAB 1000 MCG 1000 MCG: 1000 TAB at 08:32

## 2021-12-07 RX ADMIN — BENZTROPINE MESYLATE 2 MG: 2 TABLET ORAL at 08:32

## 2021-12-07 RX ADMIN — Medication 2000 MG: at 11:43

## 2021-12-07 RX ADMIN — TOPIRAMATE 25 MG: 25 TABLET, FILM COATED ORAL at 08:33

## 2021-12-07 RX ADMIN — RIVAROXABAN 10 MG: 10 TABLET, FILM COATED ORAL at 16:10

## 2021-12-07 RX ADMIN — FAMOTIDINE 20 MG: 20 TABLET ORAL at 21:33

## 2021-12-07 RX ADMIN — Medication 2000 MG: at 21:32

## 2021-12-07 RX ADMIN — ALPRAZOLAM 0.5 MG: 0.25 TABLET ORAL at 08:24

## 2021-12-07 RX ADMIN — BENZTROPINE MESYLATE 2 MG: 2 TABLET ORAL at 16:10

## 2021-12-07 RX ADMIN — OXYCODONE HYDROCHLORIDE 10 MG: 10 TABLET, FILM COATED, EXTENDED RELEASE ORAL at 08:23

## 2021-12-07 RX ADMIN — SODIUM CHLORIDE, PRESERVATIVE FREE 10 ML: 5 INJECTION INTRAVENOUS at 08:23

## 2021-12-07 RX ADMIN — TOPIRAMATE 25 MG: 25 TABLET, FILM COATED ORAL at 21:34

## 2021-12-07 ASSESSMENT — PAIN SCALES - GENERAL
PAINLEVEL_OUTOF10: 10
PAINLEVEL_OUTOF10: 8
PAINLEVEL_OUTOF10: 9
PAINLEVEL_OUTOF10: 8
PAINLEVEL_OUTOF10: 6
PAINLEVEL_OUTOF10: 8
PAINLEVEL_OUTOF10: 8

## 2021-12-07 ASSESSMENT — PAIN DESCRIPTION - LOCATION
LOCATION: ANKLE;FOOT;BACK
LOCATION: BACK;ANKLE;FOOT

## 2021-12-07 ASSESSMENT — PAIN DESCRIPTION - PAIN TYPE
TYPE: ACUTE PAIN;CHRONIC PAIN
TYPE: ACUTE PAIN;CHRONIC PAIN

## 2021-12-07 ASSESSMENT — PAIN DESCRIPTION - DESCRIPTORS
DESCRIPTORS: ACHING;SORE
DESCRIPTORS: ACHING;SORE

## 2021-12-07 ASSESSMENT — PAIN DESCRIPTION - FREQUENCY: FREQUENCY: CONTINUOUS

## 2021-12-07 ASSESSMENT — PAIN DESCRIPTION - ONSET: ONSET: ON-GOING

## 2021-12-07 ASSESSMENT — PAIN DESCRIPTION - ORIENTATION
ORIENTATION: LEFT
ORIENTATION: LEFT

## 2021-12-07 NOTE — PROGRESS NOTES
Spoke with Maryanne at [facility] regarding status on admission -- states that referral to be faxed by CM and DC likely in AM. Updated podiatry, paper scripts in soft chart. OK to DC from podiatry POV.

## 2021-12-07 NOTE — H&P
[facility] History and Physical      CHIEF COMPLAINT:    Presented for left ankle surgery    History of Present Illness:    42 yr old female with a past history of asthma, schizophrenia, hypothyroidism, type 2 diabetes mellitus who presented to the operating room this morning for an elective left ankle total joint replacement after removal of hardware and fusion takedown under general anesthesia. She had ORIF for malleolar fracture in the past.  Podiatry called us for admission. The patient does want to go to skilled nursing facility for convalescence and therapy. When I saw the patient at 7:15 PM she admitted to left foot pain. Says she is having severe pain in left foot since surgery. Also some nausea but no vomiting. Is eating a few bites of food but that is all she can handle for now. She denies any cough. She said no wheezing. No shortness of breath. Not requiring oxygen. No chest pain.     Informant(s) for H&P: Patient and chart    REVIEW OF SYSTEMS:  A comprehensive review of systems was negative except for: what is in the HPI  No headache, no ear or eye symptoms, no pharyngitis, no rhinorrhea, no neck pain, no chest pain, no shortness of breath, no palpitations, no presyncope, no syncope, no abdominal pain, positive nausea and no vomiting, no diarrhea, no urinary symptoms, no vertigo, no rashes, no pruritus, no anorexia, no fevers, no chills, no sweats, no fatigue, no focal numbness, no tingling, no weakness, no hematemesis, no hematochezia, positive left foot pain    PMH:  Past Medical History:   Diagnosis Date    Agoraphobia     Arthritis     Asthma     Atypical nevi     SEV ATN L flank 2019, Mod to Sev ATN L mid back 2019    Bipolar 1 disorder ([address]     Borderline personality disorder ([address]     Claustrophobia     Dissociative disorder     Fibromyalgia     Generalized anxiety disorder     Hidradenitis suppurativa     usually axillas    Hypoglycemia     IBS (irritable bowel syndrome)     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     SCHEDULED   FOR THE SURGERY ON 21    Osteopenia     Other manic episodes ([address]     Paranoid schizophrenia ([address]     PONV (postoperative nausea and vomiting)     Seizure ([address]     if sugar drops    Severe depression ([address]     Thyroid disease        Surgical History:  Past Surgical History:   Procedure Laterality Date    ANKLE SURGERY Left 2009    CARPAL TUNNEL RELEASE Left 1998     SECTION  2004    ENDOMETRIAL ABLATION  2009    Novasure    FOOT FRACTURE SURGERY Left 2019    REMOVAL PAINFUL FIXATION THIRD TOE LEFT FOOT performed by [facility] at [facility]    [address]      left abdomen    HYSTERECTOMY  2009    KNEE ARTHROSCOPY Left     KNEE ARTHROSCOPY Right     x 2 in    [address] Left     OTHER SURGICAL HISTORY  2009    staples removed from hernia    OTHER SURGICAL HISTORY Left 2020    dr escalera- excision dysplactis lesionx2 left flank and back    OVARY REMOVAL  2006    ROTATOR CUFF REPAIR Right 1997    ROTATOR CUFF REPAIR Right     revision    SHOULDER ARTHROSCOPY Right     TEMPOROMANDIBULAR JOINT SURGERY Left 2008    TEMPOROMANDIBULAR JOINT SURGERY Bilateral     TUBAL LIGATION      VAGAL NERVE STIMULATION  2006 battery, removed     [facility]       Medications Prior to Admission:    Prior to Admission medications    Medication Sig Start Date End Date Taking? Authorizing Provider   oxyCODONE HCl (OXY-IR) 10 MG immediate release tablet Take 10 mg by mouth daily.    Yes Historical Provider, MD   topiramate (TOPAMAX) 25 MG tablet Take 25 mg by mouth 2 times daily   Yes Historical Provider, MD   polyethylene glycol (MIRALAX) 17 g PACK packet Take 17 g by mouth 2 times daily   Yes Historical Provider, MD   lactulose (CEPHULAC) 10 g packet Take 10 g by mouth daily   Yes Historical Provider, MD   sucralfate (CARAFATE) 1 GM/10ML suspension Take 1 g by mouth 4 times daily As needed   Yes Historical Provider, MD   lamoTRIgine (LAMICTAL) 100 MG tablet take 1 tablet by mouth at bedtime 7/7/21  Yes Historical Provider, MD   omeprazole (PRILOSEC) 20 MG delayed release capsule nightly  5/28/21  Yes Historical Provider, MD   oxyCODONE ER (XTAMPZA ER) 13.5 MG C12A Take by mouth 2 times daily. Yes Historical Provider, MD   lidocaine (HM LIDOCAINE PATCH) 4 % external patch Place 1 patch onto the skin daily   Yes Historical Provider, MD   tiZANidine (ZANAFLEX) 2 MG tablet Take 2 mg by mouth 3 times daily   Yes Historical Provider, MD   acarbose (PRECOSE) 100 MG tablet Take 100 mg by mouth 4 times daily  4/25/19  Yes Historical Provider, MD   albuterol sulfate HFA (PROVENTIL HFA) 108 (90 Base) MCG/ACT inhaler Inhale 2 puffs into the lungs every 6 hours as needed  12/11/18  Yes Historical Provider, MD   ALPRAZolam (XANAX) 0.5 MG tablet Take 0.5 mg by mouth 4 times daily as needed.   1/10/19  Yes Historical Provider, MD   benztropine (COGENTIN) 2 MG tablet Take 2 mg by mouth 2 times daily  4/27/19  Yes Historical Provider, MD   cloZAPine (CLOZARIL) 100 MG tablet Take 300 mg by mouth nightly  12/11/18  Yes Historical Provider, MD SUBRAMANIAN VITAMIN B-12 TR 1000 MCG TBCR Take 1 tablet by mouth daily  2/28/19  Yes Historical Provider, MD   EPINEPHrine (Deepali Ano 2-MARIBEL) 0.15 MG/0.3ML SOAJ Inject into the skin once as needed  3/12/08  Yes Historical Provider, MD   vitamin D (ERGOCALCIFEROL) 00904 units CAPS capsule Take 50,000 Units by mouth every 14 days  5/1/19  Yes Historical Provider, MD   estradiol (VIVELLE) 0.1 MG/24HR Place 1 patch onto the skin Twice a Week Indications: Sun and Wed  5/1/19  Yes Historical Provider, MD   famotidine (PEPCID) 20 MG tablet Take 20 mg by mouth 2 times daily  4/27/19  Yes Historical Provider, MD   levothyroxine (SYNTHROID) 100 MCG tablet Take 100 mcg by mouth Daily  12/12/18  Yes Historical Provider, MD   sertraline (ZOLOFT) 100 MG tablet Take 150 mg by mouth daily  5/7/19  Yes Historical Provider, MD       Allergies:    Latex, Fish allergy, Adhesive tape, Fentanyl, Hydrocodone-acetaminophen, Iodine, Ketorolac tromethamine, Medfix ez [wound dressings], Pregabalin, Tramadol, Buspirone, Clindamycin, Gabapentin, Metronidazole, and Morphine    Social History:    reports that she quit smoking about 15 months ago. Her smoking use included cigarettes. She started smoking about 29 years ago. She has a 28.00 pack-year smoking history. She has never used smokeless tobacco. She reports previous alcohol use. She reports that she does not use drugs. Family History:   family history is not on file.        PHYSICAL EXAM:  Vitals:  /73   Pulse 84   Temp 97.9 °F (36.6 °C)   Resp 16   Ht 5' 6\" (1.676 m)   Wt 224 lb (101.6 kg)   SpO2 97%   BMI 36.15 kg/m²   General Appearance: alert and oriented to person, place and time and in no acute distress, well nourished, good hygiene, looks older than stated age, calm, cooperative, able to answer questions, sitting up in bed eating dinner  Skin: warm and dry, good turgor, no rashes, no jaundice  Head: normocephalic and atraumatic  Eyes: pupils equal, round, and reactive to light, extraocular eye movements intact, conjunctivae normal  Neck: neck supple and non tender without mass, no thyromegaly  Pulmonary/Chest: clear to auscultation bilaterally- no wheezes, rales or rhonchi, normal air movement, no respiratory distress  Cardiovascular: normal rate, normal S1 and S2 and no carotid bruits  Abdomen: soft, non-tender, non-distended, normal bowel sounds, no masses or organomegaly, no guarding, no rebound  Extremities: no cyanosis, no clubbing and no edema, good pedal pulses bilaterally, good cap refill of fingers/toes; left foot in thick dressing/casting; tips of toes of left foot -- pink/warm  Neurologic: no cranial nerve deficit and speech normal, mental status normal, moving all extremities equally        LABS:  CBC:   Lab Results   Component Value Date    WBC 9.5 08/06/2021    WBC 7.9 10/11/2020    RBC 4.67 08/06/2021    HGB 13.5 08/06/2021    HCT 40.5 08/06/2021    MCV 86.8 08/06/2021    MCH 28.9 08/06/2021    MCHC 33.3 08/06/2021    RDW 14.0 08/06/2021     08/06/2021    MPV 7.3 08/06/2021     CMP:    Lab Results   Component Value Date     08/06/2021    K 4.7 08/06/2021    K 4.2 10/11/2020     08/06/2021    CO2 27 08/06/2021    BUN 13 08/06/2021    CREATININE 0.62 08/06/2021    GFRAA >60 10/11/2020    LABGLOM >60 10/11/2020    GLUCOSE 94 08/06/2021    CALCIUM 9.4 08/06/2021     BMP:    Lab Results   Component Value Date     08/06/2021    K 4.7 08/06/2021    K 4.2 10/11/2020     08/06/2021    CO2 27 08/06/2021    BUN 13 08/06/2021    CREATININE 0.62 08/06/2021    CALCIUM 9.4 08/06/2021    GFRAA >60 10/11/2020    LABGLOM >60 10/11/2020    GLUCOSE 94 08/06/2021     Calcium:    Lab Results   Component Value Date    CALCIUM 9.4 08/06/2021       Radiology:   No orders to display       EKG:   SR, 79, normal (EKG done in August)    ASSESSMENT:      Principal Problem:    History of total replacement of right ankle  Active Problems:    Ankle arthritis    Status post osteotomy    Mild intermittent asthma    Acquired hypothyroidism  Resolved Problems:    * No resolved hospital problems. *      PLAN:    1. POD # 0 s/p left ankle fusion takedown/total ankle replacement, left calcaneal osteomy, removal of hardware, repair of malleolar fracture -- doing well since surgery  -Post-op pain/nausea control  -Podiatry to continue to follow  -pt wants to be placed in [address] [place] [address]  -Activity/weight bearing per podiatry  -DVT prophylaxis per podiatry    2. Asthma -- controlled, no wheezing, mild intermittent  -2 puffs albuterol MDI prn (this is what she is on at home)    3. Hypothyroidism -- continue Synthroid 100 micrograms daily    4. Anxiety -- continue home Xanax    5. Schizophrenia -- stable -- on clozaril, cogentin -- continue    6. DM2 -- on acarbose at home; most recent A1c 6.2  -SS insulin here, medium dose for now  -Check sugars intensively 4 times/day    Code Status: TOTAL support  DVT prophylaxis: Xarelto per podiatry      NOTE: This report was transcribed using voice recognition software. Every effort was made to ensure accuracy; however, inadvertent computerized transcription errors may be present.     Electronically signed by Julia Kowalski DO on 12/6/2021 at 10:49 PM

## 2021-12-07 NOTE — PROGRESS NOTES
Physical Therapy Initial Assessment     Name: Rita Book  : 1980  MRN: [id number]      Date of Service: 2021    Evaluating PT:  Maria E Romero, PT, DPT [id number]      Room #:  8365/7237-L  Diagnosis:  Ankle arthritis [M19.079]  PMHx/PSHx:    Past Medical History           Date Comments     Atypical nevi [D22.9]  SEV ATN L flank 2019, Mod to Sev ATN L mid back 2019     Asthma [J45.909]       Hypoglycemia [E16.2]       Seizure ([address] [R56.9]  if sugar drops     Paranoid schizophrenia ([address] [F20.0]       Generalized anxiety disorder [F41.1]       Borderline personality disorder ([address] [F60.3]       Bipolar 1 disorder ([address] [F31.9]       Agoraphobia [F40.00]       Claustrophobia [F40.240]       Severe depression ([address] [F32.2]       Fibromyalgia [M79.7]       Hidradenitis suppurativa [L73.2]  usually axillas     PONV (postoperative nausea and vomiting) [R11.2, Z98.890]       Arthritis [M19.90]       Thyroid disease [E07.9]       Other manic episodes ([address] [F30.8]       Dissociative disorder [F44.9]       Osteopenia [M85.80]       IBS (irritable bowel syndrome) [K58.9]       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]  SCHEDULED   FOR THE SURGERY ON 21         Past Surgical History         Laterality Date Comments   Gastric bypass surgery [SHX52]      Concepcion tooth extraction [SHX21]      Rotator cuff repair [ZRX502] Right 1997    Carpal tunnel release [PQM111] Left      section [SHX87]  2004    Tubal ligation [SHX77]  2004    HERNIA REPAIR Domonique Christine  2005 left abdomen   Ovary removal [SHX86]  2006    VAGAL NERVE STIMULATION [QNS674]  2006 2007 battery, removed    Shoulder arthroscopy [QRQ891] Right     Temporomandibular joint surgery [SHX35] Left 2008    Temporomandibular joint surgery [SHX35] Bilateral     Endometrial ablation Metta Copier  2009 Novasure   Hysterectomy [SHX81]  2009    OTHER SURGICAL HISTORY [QEH342]  2009 staples removed from hernia   Ankle surgery [SAL588] Left 2009    Rotator cuff repair [LSV711] Right 2012 revision   Knee arthroscopy [BVE415] Left 2012    Knee arthroscopy [FFH830] Right  x 2 in 2014   Leg Tendon Surgery [VFQ8841] Left     Foot Fusion [ASI521]    x3    Foot fracture surgery [DQI427] Left 6/21/2019 REMOVAL PAINFUL FIXATION THIRD TOE LEFT FOOT performed by Priscilla Stone DPM at [facility] Left 06/08/2020 dr escalera- excision dysplactis lesionx2 left flank and back         Procedure/Surgery:  12/6/2021 TAR (Total ankle replacement of the left lower extremity 2. Left calcaneal slide osteotomy 3. ORIF of iatrogenic medial malleolar fracture left lower extremity)  Precautions:  Falls, NWB LLE  Equipment Needs:  Has [employer], SPC    SUBJECTIVE:    Pt lives with her parents in a 1 story home with 3+1 stairs to enter and grab bars. Bed is on 1st floor and bath is on 1st floor. Pt ambulated with a SPC PTA. OBJECTIVE:   Initial Evaluation  Date: 12/7/2021 Treatment Date:  NA Short Term/ Long Term   Goals   AM-PAC 6 Clicks 41/01     Was pt agreeable to Eval/treatment? Yes      Does pt have pain? Reported pain in L ankle intermittently. Did not quantify. Bed Mobility  Rolling: SBA  Supine to sit: SBA  Sit to supine: SBA  Scooting: SBA  Rolling: Independent  Supine to sit: Independent  Sit to supine: Independent  Scooting: Independent   Transfers Sit to stand: Min A  Stand to sit: Min A  Stand pivot: Min A  Sit to stand: SBA  Stand to sit: SBA  Stand pivot: SBA   Ambulation    NT, pt reported not able to \"hop\". Pt able to shuffle on R foot. >5 feet with [employer] with Min A   Stair negotiation: ascended and descended  NT  TBD   ROM BUE:  Per OT  BLE:  WFL, L ankle casted     Strength BUE:  Per OT  BLE:  RLE 4-/5, LLE hip/knee 4-/5  Increase strength 1/3 grade.    Balance Sitting EOB:  SBA  Dynamic Standing:  Min A with [employer]  Sitting EOB:  Independent  Dynamic Standing:  Min A with [employer]     Pt is A & O x 4  Sensation:  Pt denies safety during sit to stand task. · Gait training: Pt not able to hop, and only able to shuffle on right foot for short distance. · Therapeutic exercises: As noted above  · Neuromuscular education: NT    Pt's/ family goals   1. To go to rehab. Prognosis is good for reaching above PT goals. Patient and or family understand(s) diagnosis, prognosis, and plan of care. Yes     PHYSICAL THERAPY PLAN OF CARE:    PT POC is established based on physician order and patient diagnosis     Referring provider/PT Order:    12/07/21 1030  PT eval and treat  Start:  12/07/21 1030,   End:  12/07/21 1030,   ONE TIME,   Standing Count:  1 Occurrences,   R         Jay Mosher DPM     Diagnosis:  Ankle arthritis [M19.079]  Specific instructions for next treatment:  Subsequent PT sessions with focus on improved mobility/ambulation    Current Treatment Recommendations:     [x] Strengthening to improve independence with functional mobility   [] ROM to improve independence with functional mobility   [x] Balance Training to improve static/dynamic balance and to reduce fall risk  [x] Endurance Training to improve activity tolerance during functional mobility   [x] Transfer Training to improve safety and independence with all functional transfers   [x] Gait Training to improve gait mechanics, endurance and asses need for appropriate assistive device  [] Stair Training in preparation for safe discharge home and/or into the community   [x] Positioning to prevent skin breakdown and contractures  [x] Safety and Education Training   [x] Patient/Caregiver Education   [] HEP  [] Other     PT long term treatment goals are located in above grid    Frequency of treatments: 2-5x/week x 1-2 weeks.     Time in  1300  Time out  1320    Total Treatment Time  0 minutes     Evaluation Time includes thorough review of current medical information, gathering information on past medical history/social history and prior level of function, completion of standardized testing/informal observation of tasks, assessment of data and education on plan of care and goals.     CPT codes:  [x] Low Complexity PT evaluation 49441  [] Moderate Complexity PT evaluation 30382  [] High Complexity PT evaluation 64720  [] PT Re-evaluation 89107  [] Gait training 08904 - minutes  [] Manual therapy 53064 - minutes  [] Therapeutic activities 98468 - minutes  [] Therapeutic exercises 53819 - minutes  [] Neuromuscular reeducation 70139 - minutes     Ria Mayberry PT, DPT  License [id number]

## 2021-12-07 NOTE — ANESTHESIA POSTPROCEDURE EVALUATION
Department of Anesthesiology  Postprocedure Note    Patient: Rita Robb  MRN: [id number]  YOB: 1980  Date of evaluation: 12/6/2021  Time:  8:01 PM     Procedure Summary     Date: 12/06/21 Room / Location: [facility] OR 05 / [facility]    Anesthesia Start: 1218 Anesthesia Stop: 1353    Procedures:       LEFT ANKLE FUSION TAKEDOWN LEFT TOTAL ANKLE REPLACEMENT (Left )      LEFT CALCANEAL OSTEOTOMY , REMOVAL HARDWARE AND REPAIR OF MALLEOLAR FRACTURE (Left ) Diagnosis: (LEFT ANKLE PAIN LEFT ANKLE ARTHRITIS)    Surgeons: Tiki Allan DPM Responsible Provider: Adin Villalba DO    Anesthesia Type: general ASA Status: 3          Anesthesia Type: general    Michaela Phase I: Michaela Score: 10    Michaela Phase II:      Last vitals: Reviewed and per EMR flowsheets.        Anesthesia Post Evaluation    Patient location during evaluation: PACU  Patient participation: complete - patient participated  Level of consciousness: awake and alert  Pain score: 1  Airway patency: patent  Nausea & Vomiting: no nausea and no vomiting  Complications: no  Cardiovascular status: hemodynamically stable  Respiratory status: acceptable  Hydration status: euvolemic

## 2021-12-07 NOTE — PROGRESS NOTES
Occupational Therapy  OCCUPATIONAL THERAPY INITIAL EVALUATION      Date:2021  Patient Name: Monet Echevarria  MRN: [id number]  : 1980  [facility]    OCCUPATIONAL THERAPY INITIAL EVALUATION    [facility], [place]         ENQP:                                                  Patient Name: Monet Echevarria    MRN: [id number]    : 1980    Room: 702-A      Evaluating OT: Jose Fuentes OTR/L   [id number]      Referring Tarsha Lockwood DPM    Specific Provider Orders/Date:OT eval and treat 2021      Diagnosis:  Ankle arthritis [M19.079]    Surgery:  LEFT ANKLE FUSION TAKEDOWN LEFT TOTAL ANKLE REPLACEMENT (Left )      LEFT CALCANEAL OSTEOTOMY , REMOVAL HARDWARE AND REPAIR OF MALLEOLAR FRACTURE (Left )    Pertinent Medical History: falls, asthma, bipolar disorder, paranoid schizophrenia, depression, ORIF for malleolar fracture     Precautions:  Fall Risk, NWB L LE      Assessment of current deficits    [x] Functional mobility  [x]ADLs  [x] Strength               []Cognition    [x] Functional transfers   [x] IADLs         [x] Safety Awareness   [x]Endurance    [] Fine Coordination              [x] Balance      [] Vision/perception   []Sensation     []Gross Motor Coordination  [] ROM  [] Delirium                   [] Motor Control     OT PLAN OF CARE   OT POC based on physician orders, patient diagnosis and results of clinical assessment    Frequency/Duration  2-4 days/wk for 2 weeks PRN   Specific OT Treatment Interventions to include:   ADL retraining/adapted techniques and AE recommendations to increase functional independence within precautions                    Energy conservation techniques to improve tolerance for selfcare routine   Functional transfer/mobility training/DME recommendations for increased independence, safety and fall prevention         Patient/family education to increase safety and functional independence             Environmental modifications for safe mobility and completion of ADLs                             Therapeutic activity to improve functional performance during ADLs. Therapeutic exercise to improve tolerance and functional strength for ADLs    Balance retraining/tolerance tasks for facilitation of postural control with dynamic challenges during ADLs .       Positioning to improve functional independence      Recommended Adaptive Equipment: TBD     Home Living: Pt lives with parents, 1 story with 3 + 1 steps   Bathroom setup: tub/shower    Equipment owned: walker, w/c     Prior Level of Function: Independent  with ADLs , assist  with IADLs; ambulated with cane     Pain Level: no pain this session ;   Cognition: A&O: pleasant and following command s   Memory:  Fair+   Sequencing:  Fair+   Problem solving:  Fair    Judgement/safety:  Fair      Functional Assessment:  AM-PAC Daily Activity Raw Score: 16/24   Initial Eval Status  Date: 12/7/21 Treatment Status  Date: STGs = LTGs  Time frame: 10-14 days   Feeding Independent      Grooming Set-up, seated   (decrease standing , L LE NWB)  Able to brush teeth   Mod I    UB Dressing Min A  Mod I    LB Dressing Mod A   Mod I    Bathing  Mod A   Mod I    Toileting SBA  Seated on BSC   Mod I    Bed Mobility  SBA  Supine <> sit   Mod I    Functional Transfers Min A  Sit-stand from bed, BSC   Mod I    Functional Mobility SPT to/from    Bed<>BSC only   Min A  Reminders for NWB precaution  Mod I at w/c level  with good tolerance    Balance Sitting:     Static:  Independent     Dynamic:Min A  Standing: Min A   Mod I    Activity Tolerance Fair with light activity   Good  with ADL activity    Visual/  Perceptual Glasses: yes                 Hand Dominance right    AROM (PROM) Strength Additional Info:    RUE  WFL  Patient states she has history of R shoulder soreness  WFL good  and wfl FMC/dexterity noted during ADL tasks       [facility] WFL good  and wfl FMC/dexterity noted during ADL tasks     Hearing: WFL   Sensation:  No c/o numbness or tingling   Tone: WFL   Edema: none observed     Comments: Upon arrival patient lying in bed . At end of session, patient returned to bed  with call light and phone within reach, all lines and tubes intact. Overall patient demonstrated  decreased independence and safety during completion of ADL/functional transfer/mobility tasks. Pt would benefit from continued skilled OT to increase safety and independence with completion of ADL/IADL tasks for functional independence and quality of life. Rehab Potential: good for established goals     Patient / Family Goal: rehab       Patient and/or family were instructed on functional diagnosis, prognosis/goals and OT plan of care. Demonstrated good  understanding. Eval Complexity: Low    Time In: 1315  Time Out: 1335      Min Units   OT Eval Low 97165 x  1   OT Eval Medium 60272      OT Eval High 19856      OT Re-Eval [id number]       Therapeutic Ex 89044       Therapeutic Activities 77609       ADL/Self Care 78734       Orthotic Management 25873       Manual 29474     Neuro Re-Ed 78685       Non-Billable Time          Evaluation Time additionally includes thorough review of current medical information, gathering information on past medical history/social history and prior level of function, interpretation of standardized testing/informal observation of tasks, assessment of data and development of plan of care and goals.             Glenroy Kwon  OTR/L  OT [id number]

## 2021-12-07 NOTE — PROGRESS NOTES
Patient alert and oriented X4. Patient has been up out of bed to bedside commode multiple times this shift. Activly using incentive spirometer and deep breathing. Patients dressing clean dry and intact.

## 2021-12-07 NOTE — CARE COORDINATION
Introduced my self and provided explanation of CM role to patient. Patient is awake, alert, and aware of current diagnosis and treatment plan including pt/ot evaluations, post op care and discharge planning. Patient voices desire for short term rehab stay following hospital discharge. She states that she has been to a facility in [place], [address]. She provided name and contact info for admission staff - Maryanne at [phone]. At this time she denies need for listing of any other facilities    Call placed to [facility] and left VM. Await return call. PT/OT evals are pending    Patient voices she is vaccinated and is agreeable to COVID testing if facility requires same. She adds that she will need transportation as her parents cannot provide. Will follow along with  and assist with discharge planning as necessary. Maria Elena Smith.  Campos, MSN, RN  [facility] Case Management  [phone]

## 2021-12-07 NOTE — PROGRESS NOTES
Department of Podiatry  Progress Note    SUBJECTIVE:   was evaluated at bedside this afternoon s/p LLE TAR , calc osteotomy, ORIF medial mal fracture (DOS: 12/6/21). No acute events overnight. Patient denies any N/V/D/F/C/SOB/CP and has no other pedal complaints at this time.      OBJECTIVE:    Scheduled Meds:   sodium chloride flush  5-40 mL IntraVENous 2 times per day    scopolamine  1 patch TransDERmal Q72H    sodium chloride flush  5-40 mL IntraVENous 2 times per day    rivaroxaban  10 mg Oral Daily    ceFAZolin  2,000 mg IntraVENous Q8H    topiramate  25 mg Oral BID    sertraline  150 mg Oral Daily    vitamin B-12  1,000 mcg Oral Daily    polyethylene glycol  17 g Oral BID    oxyCODONE  10 mg Oral 2 times per day    lidocaine  1 patch TransDERmal Daily    lamoTRIgine  100 mg Oral Nightly    levothyroxine  100 mcg Oral Daily    benztropine  2 mg Oral BID    cloZAPine  300 mg Oral Nightly    [START ON 12/8/2021] estradiol  1 patch TransDERmal Once per day on Sun Wed    famotidine  20 mg Oral BID    lactulose  10 g Oral Daily    insulin lispro  0-12 Units SubCUTAneous TID WC    insulin lispro  0-6 Units SubCUTAneous Nightly     Continuous Infusions:   sodium chloride      sodium chloride      dextrose       PRN Meds:.oxyCODONE HCl, sodium chloride flush, sodium chloride, sodium chloride flush, sodium chloride, ondansetron **OR** ondansetron, traMADol, ALPRAZolam, sucralfate, polyethylene glycol, acetaminophen **OR** acetaminophen, glucose, dextrose, glucagon (rDNA), dextrose, albuterol    Allergies   Allergen Reactions    Latex Rash     second degree burn    Fish Allergy Anaphylaxis    Adhesive Tape Other (See Comments)     Redness, blisters    Fentanyl Other (See Comments)     Fentanyl patch caused panic attack    Hydrocodone-Acetaminophen      GI BLEED    Iodine     Ketorolac Tromethamine Nausea Only    Medfix Ez [Wound Dressings]     Pregabalin Other (See Comments)    Tramadol Nausea Only    Buspirone Rash    Clindamycin Rash    Gabapentin Rash    Metronidazole Rash    Morphine Rash     IV \"a red line started going up towards my heart\"       /69   Pulse 87   Temp 98.1 °F (36.7 °C) (Oral)   Resp 16   Ht 5' 6\" (1.676 m)   Wt 224 lb (101.6 kg)   SpO2 100%   BMI 36.15 kg/m²       EXAM:  -Dressing today are clean, dry and intact without dishevelment.   -Digits to the LLE are cool to touch. Patient able to move digits without pain. No discoloration or hyperpigmentation noted to digits.   -No posterior calf pain on palpation.      Scheduled Meds:   sodium chloride flush  5-40 mL IntraVENous 2 times per day    scopolamine  1 patch TransDERmal Q72H    sodium chloride flush  5-40 mL IntraVENous 2 times per day    rivaroxaban  10 mg Oral Daily    ceFAZolin  2,000 mg IntraVENous Q8H    topiramate  25 mg Oral BID    sertraline  150 mg Oral Daily    vitamin B-12  1,000 mcg Oral Daily    polyethylene glycol  17 g Oral BID    oxyCODONE  10 mg Oral 2 times per day    lidocaine  1 patch TransDERmal Daily    lamoTRIgine  100 mg Oral Nightly    levothyroxine  100 mcg Oral Daily    benztropine  2 mg Oral BID    cloZAPine  300 mg Oral Nightly    [START ON 12/8/2021] estradiol  1 patch TransDERmal Once per day on Sun Wed    famotidine  20 mg Oral BID    lactulose  10 g Oral Daily    insulin lispro  0-12 Units SubCUTAneous TID WC    insulin lispro  0-6 Units SubCUTAneous Nightly     Continuous Infusions:   sodium chloride      sodium chloride      dextrose       PRN Meds:.oxyCODONE HCl, sodium chloride flush, sodium chloride, sodium chloride flush, sodium chloride, ondansetron **OR** ondansetron, traMADol, ALPRAZolam, sucralfate, polyethylene glycol, acetaminophen **OR** acetaminophen, glucose, dextrose, glucagon (rDNA), dextrose, albuterol    RADIOLOGY:  No orders to display     /69   Pulse 87   Temp 98.1 °F (36.7 °C) (Oral)   Resp 16   Ht 5' 6\" (1.676

## 2021-12-07 NOTE — PROGRESS NOTES
[facility]   [facility] Note    Admitting Date and Time: 12/6/2021  9:39 AM  Admit Dx: Ankle arthritis [M19.079]    Subjective: Admitted on 12th, presented as needed left ankle surgery, known asthma, hypothyroidism, diabetes, prior ORIF, needed hardware removal, came for another elective surgery. Patient had left ankle replacement. Left clinic [place] osteotomy. Patient was admitted with Ankle arthritis [M19.079]. Patient feels not well, mostly due to pain, does say that her as needed which was changed from every 4 to every 6 she is not able to tolerate it. Patient is fully awake, alert, communicates well. Does say that she follows with Dr. Noelendocrinology from [facility] for her hypoglycemic issues. Per RN: No new complaints. ROS: denies fever, chills, cp, sob, n/v, HA unless stated above.      sodium chloride flush  5-40 mL IntraVENous 2 times per day    scopolamine  1 patch TransDERmal Q72H    sodium chloride flush  5-40 mL IntraVENous 2 times per day    rivaroxaban  10 mg Oral Daily    ceFAZolin  2,000 mg IntraVENous Q8H    topiramate  25 mg Oral BID    sertraline  150 mg Oral Daily    vitamin B-12  1,000 mcg Oral Daily    polyethylene glycol  17 g Oral BID    oxyCODONE  10 mg Oral 2 times per day    lidocaine  1 patch TransDERmal Daily    lamoTRIgine  100 mg Oral Nightly    levothyroxine  100 mcg Oral Daily    benztropine  2 mg Oral BID    cloZAPine  300 mg Oral Nightly    [START ON 12/8/2021] estradiol  1 patch TransDERmal Once per day on Sun Wed    famotidine  20 mg Oral BID    lactulose  10 g Oral Daily    insulin lispro  0-12 Units SubCUTAneous TID     insulin lispro  0-6 Units SubCUTAneous Nightly     sodium chloride flush, 5-40 mL, PRN  sodium chloride, 25 mL, PRN  sodium chloride flush, 5-40 mL, PRN  sodium chloride, 25 mL, PRN  ondansetron, 4 mg, Q8H PRN   Or  ondansetron, 4 mg, Q6H PRN  traMADol, 50 mg, Q6H PRN  ALPRAZolam, 0.5 mg, 4x Daily PRN  sucralfate, 1 g, Q6H PRN  oxyCODONE HCl, 10 mg, Q6H PRN  polyethylene glycol, 17 g, Daily PRN  acetaminophen, 650 mg, Q6H PRN   Or  acetaminophen, 650 mg, Q6H PRN  glucose, 15 g, PRN  dextrose, 12.5 g, PRN  glucagon (rDNA), 1 mg, PRN  dextrose, 100 mL/hr, PRN  albuterol, 2.5 mg, Q6H PRN         Objective:    /66   Pulse 90   Temp 98.8 °F (37.1 °C)   Resp 16   Ht 5' 6\" (1.676 m)   Wt 224 lb (101.6 kg)   SpO2 100%   BMI 36.15 kg/m²   General Appearance: alert and oriented to person, place and time, well-developed and well-nourished, in no acute distress  Skin: warm and dry, no rash or erythema  Head: normocephalic and atraumatic  Eyes: pupils equal, round, and reactive to light, extraocular eye movements intact, conjunctivae normal  ENT: tympanic membrane, external ear and ear canal normal bilaterally, oropharynx clear and moist with normal mucous membranes  Neck: neck supple and non tender without mass, no thyromegaly or thyroid nodules, no cervical lymphadenopathy   Pulmonary/Chest: clear to auscultation bilaterally- no wheezes, rales or rhonchi, normal air movement, no respiratory distress  Cardiovascular: normal rate, normal S1 and S2, no gallops, intact distal pulses and no carotid bruits  Abdomen: soft, non-tender, non-distended, normal bowel sounds, no masses or organomegaly    Accu-Cheks well controlled  WBC 11.2  Hemoglobin 12.2      No results for input(s): NA, K, CL, CO2, BUN, CREATININE, GLUCOSE, CALCIUM in the last 72 hours. No results for input(s): WBC, RBC, HGB, HCT, MCV, MCH, MCHC, RDW, PLT, MPV in the last 72 hours. Radiology:   No orders to display       Assessment:    Principal Problem:    History of total replacement of right ankle  Active Problems:    Ankle arthritis    Status post osteotomy    Mild intermittent asthma    Acquired hypothyroidism  Resolved Problems:    * No resolved hospital problems. *      Plan:  1.  Postop management for ankle surgery, will be up to podiatry. 2. Pain, patient is on significant amount of pain medications, however will change to every 4 for as needed now. 3. Asthma, stable. 4. Hypothyroidism, controlled with Synthroid 100 MCG. 5. Schizophrenia, controlled, patient remains on Cogentin and Clozaril. 6. Anxiety at this time controlled  7. Diabetes, on sliding scale, per patient with addition of frequent hypoglycemia, at this time Accu-Cheks well controlled, no change. 8. On famotidine as well as Xarelto. 9. Patient requested to be placed in SNF, will ask social work to get involved.         Electronically signed by Connie Perry MD on 12/7/2021 at 8:34 AM

## 2021-12-08 VITALS
OXYGEN SATURATION: 100 % | TEMPERATURE: 98.2 F | DIASTOLIC BLOOD PRESSURE: 66 MMHG | HEIGHT: 66 IN | SYSTOLIC BLOOD PRESSURE: 113 MMHG | HEART RATE: 88 BPM | RESPIRATION RATE: 16 BRPM | BODY MASS INDEX: 36 KG/M2 | WEIGHT: 224 LBS

## 2021-12-08 LAB
METER GLUCOSE: 104 MG/DL (ref 74–99)
METER GLUCOSE: 89 MG/DL (ref 74–99)
METER GLUCOSE: 90 MG/DL (ref 74–99)
SARS-COV-2, NAAT: NOT DETECTED

## 2021-12-08 PROCEDURE — 99217 PR OBSERVATION CARE DISCHARGE MANAGEMENT: CPT | Performed by: INTERNAL MEDICINE

## 2021-12-08 PROCEDURE — G0378 HOSPITAL OBSERVATION PER HR: HCPCS

## 2021-12-08 PROCEDURE — 87635 SARS-COV-2 COVID-19 AMP PRB: CPT

## 2021-12-08 PROCEDURE — 6370000000 HC RX 637 (ALT 250 FOR IP): Performed by: FAMILY MEDICINE

## 2021-12-08 PROCEDURE — 82962 GLUCOSE BLOOD TEST: CPT

## 2021-12-08 PROCEDURE — 6360000002 HC RX W HCPCS: Performed by: PODIATRIST

## 2021-12-08 PROCEDURE — 96376 TX/PRO/DX INJ SAME DRUG ADON: CPT

## 2021-12-08 PROCEDURE — 6370000000 HC RX 637 (ALT 250 FOR IP): Performed by: PODIATRIST

## 2021-12-08 PROCEDURE — 97530 THERAPEUTIC ACTIVITIES: CPT

## 2021-12-08 PROCEDURE — 6370000000 HC RX 637 (ALT 250 FOR IP): Performed by: INTERNAL MEDICINE

## 2021-12-08 RX ORDER — DOXYCYCLINE HYCLATE 100 MG
100 TABLET ORAL 2 TIMES DAILY
Qty: 28 TABLET | Refills: 0
Start: 2021-12-08 | End: 2021-12-22

## 2021-12-08 RX ORDER — OXYCODONE HCL 10 MG/1
10 TABLET, FILM COATED, EXTENDED RELEASE ORAL ONCE
Status: COMPLETED | OUTPATIENT
Start: 2021-12-08 | End: 2021-12-08

## 2021-12-08 RX ORDER — TRAMADOL HYDROCHLORIDE 50 MG/1
50 TABLET ORAL EVERY 8 HOURS PRN
Qty: 9 TABLET | Refills: 0 | Status: SHIPPED | OUTPATIENT
Start: 2021-12-08 | End: 2021-12-11

## 2021-12-08 RX ADMIN — Medication 2000 MG: at 04:57

## 2021-12-08 RX ADMIN — CYANOCOBALAMIN TAB 1000 MCG 1000 MCG: 1000 TAB at 08:34

## 2021-12-08 RX ADMIN — TOPIRAMATE 25 MG: 25 TABLET, FILM COATED ORAL at 08:34

## 2021-12-08 RX ADMIN — ACETAMINOPHEN 650 MG: 325 TABLET ORAL at 08:30

## 2021-12-08 RX ADMIN — FAMOTIDINE 20 MG: 20 TABLET ORAL at 20:10

## 2021-12-08 RX ADMIN — OXYCODONE 10 MG: 5 TABLET ORAL at 13:08

## 2021-12-08 RX ADMIN — OXYCODONE HYDROCHLORIDE 10 MG: 10 TABLET, FILM COATED, EXTENDED RELEASE ORAL at 08:30

## 2021-12-08 RX ADMIN — LACTULOSE 10 G: 20 SOLUTION ORAL at 08:30

## 2021-12-08 RX ADMIN — POLYETHYLENE GLYCOL 3350 17 G: 17 POWDER, FOR SOLUTION ORAL at 08:31

## 2021-12-08 RX ADMIN — OXYCODONE 10 MG: 5 TABLET ORAL at 05:03

## 2021-12-08 RX ADMIN — TRAMADOL HYDROCHLORIDE 50 MG: 50 TABLET, FILM COATED ORAL at 17:04

## 2021-12-08 RX ADMIN — OXYCODONE HYDROCHLORIDE 10 MG: 10 TABLET, FILM COATED, EXTENDED RELEASE ORAL at 18:03

## 2021-12-08 RX ADMIN — OXYCODONE 10 MG: 5 TABLET ORAL at 00:18

## 2021-12-08 RX ADMIN — OXYCODONE HYDROCHLORIDE 10 MG: 10 TABLET, FILM COATED, EXTENDED RELEASE ORAL at 20:13

## 2021-12-08 RX ADMIN — LEVOTHYROXINE SODIUM 100 MCG: 100 TABLET ORAL at 08:30

## 2021-12-08 RX ADMIN — SUCRALFATE 1 G: 1 TABLET ORAL at 08:30

## 2021-12-08 RX ADMIN — RIVAROXABAN 10 MG: 10 TABLET, FILM COATED ORAL at 17:02

## 2021-12-08 RX ADMIN — FAMOTIDINE 20 MG: 20 TABLET ORAL at 08:30

## 2021-12-08 RX ADMIN — SERTRALINE 150 MG: 100 TABLET, FILM COATED ORAL at 08:30

## 2021-12-08 ASSESSMENT — PAIN SCALES - GENERAL
PAINLEVEL_OUTOF10: 0
PAINLEVEL_OUTOF10: 8
PAINLEVEL_OUTOF10: 6
PAINLEVEL_OUTOF10: 9
PAINLEVEL_OUTOF10: 6
PAINLEVEL_OUTOF10: 8
PAINLEVEL_OUTOF10: 6
PAINLEVEL_OUTOF10: 8

## 2021-12-08 ASSESSMENT — PAIN DESCRIPTION - ONSET: ONSET: GRADUAL

## 2021-12-08 ASSESSMENT — PAIN DESCRIPTION - FREQUENCY: FREQUENCY: INTERMITTENT

## 2021-12-08 ASSESSMENT — PAIN DESCRIPTION - PAIN TYPE: TYPE: SURGICAL PAIN

## 2021-12-08 ASSESSMENT — PAIN DESCRIPTION - ORIENTATION: ORIENTATION: LEFT

## 2021-12-08 ASSESSMENT — PAIN DESCRIPTION - LOCATION: LOCATION: ANKLE

## 2021-12-08 ASSESSMENT — PAIN DESCRIPTION - DESCRIPTORS: DESCRIPTORS: ACHING;DISCOMFORT

## 2021-12-08 ASSESSMENT — PAIN DESCRIPTION - PROGRESSION: CLINICAL_PROGRESSION: GRADUALLY WORSENING

## 2021-12-08 NOTE — DISCHARGE INSTR - COC
Continuity of Care Form    Patient Name: Aria Laboy   :  1980  MRN:  [id number]    Admit date:  2021  Discharge date:  21    Code Status Order: Full Code   Advance Directives:   Advance Care Flowsheet Documentation       Date/Time Healthcare Directive Type of Healthcare Directive Copy in [address] Agent's Name Healthcare Agent's Phone Number    21 1050 No, patient does not have an advance directive for healthcare treatment         21 1608 No, patient does not have an advance directive for healthcare treatment                 Admitting Physician:  Andres Call DO  PCP: Vikram Cobb DO    Discharging Nurse: Nan Bahena 23 Unit/Room#: 1019/6748-M  Discharging Unit Phone Number: [phone]    Emergency Contact:   Extended Emergency Contact Information  Primary Emergency Contact: Bebeto cerna  Home Phone: [phone]  Relation: Parent  Secondary Emergency Contact: Sherron riley Dr Phone: [phone]  Relation: Parent    Past Surgical History:  Past Surgical History:   Procedure Laterality Date    ANKLE SURGERY Left 2009    CARPAL TUNNEL RELEASE Left 1998     SECTION  2004    ENDOMETRIAL ABLATION  2009    [facility]    FOOT FRACTURE SURGERY Left 2019    REMOVAL PAINFUL FIXATION THIRD TOE LEFT FOOT performed by Juanito Schmitt DPM at [place] [address]      left abdomen    HYSTERECTOMY  2009    KNEE ARTHROSCOPY Left     KNEE ARTHROSCOPY Right     x 2 in 2014    [address] Left     OTHER SURGICAL HISTORY  2009    staples removed from hernia    OTHER SURGICAL HISTORY Left 2020    dr escalera- excision dysplactis lesionx2 left flank and back    OVARY REMOVAL  2006    ROTATOR CUFF REPAIR Right [place] Right 2012    revision    SHOULDER ARTHROSCOPY Right     TEMPOROMANDIBULAR JOINT SURGERY Left     TEMPOROMANDIBULAR JOINT SURGERY Bilateral     TUBAL LIGATION      VAGAL NERVE STIMULATION  2006 battery, removed     [facility]       Immunization History:   Immunization History   Administered Date(s) Administered    COVID-19, Fanny Bran, Primary or Immunocompromised, PF, 100mcg/0.5mL 2021, 2021       Active Problems:  Patient Active Problem List   Diagnosis Code    Fracture dislocation of toe joint, left, closed, initial encounter S92.912A    Dysplastic Nevus of back D48.5    Ankle arthritis M19.079    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    Status post osteotomy Z98.890    Mild intermittent asthma J45.20    Acquired hypothyroidism E03.9       Isolation/Infection:   Isolation            No Isolation          Patient Infection Status       None to display            Nurse Assessment:  Last Vital Signs: /66   Pulse 89   Temp 98.7 °F (37.1 °C) (Oral)   Resp 16   Ht 5' 6\" (1.676 m)   Wt 224 lb (101.6 kg)   SpO2 100%   BMI 36.15 kg/m²     Last documented pain score (0-10 scale): Pain Level: 8  Last Weight:   Wt Readings from Last 1 Encounters:   21 224 lb (101.6 kg)     Mental Status:  oriented    IV Access:  - None    Nursing Mobility/ADLs:  Walking   Assisted  Transfer  Assisted  Bathing  Assisted  Dressing  Assisted  Toileting  Assisted  Feeding  Assisted  Med Admin  Assisted  Med Delivery   none    Wound Care Documentation and Therapy:        Elimination:  Continence:    Bowel: {YES / R}  Bladder: {YES / VX:16454}  Urinary Catheter: {Urinary Catheter:903011488}   Colostomy/Ileostomy/Ileal Conduit: {YES / JK:57213}       Date of Last BM: ***    Intake/Output Summary (Last 24 hours) at 2021 1043  Last data filed at 2021 0739  Gross per 24 hour   Intake 300 ml   Output 750 ml   Net -450 ml     I/O last 3 completed shifts:  In: -   Out: 750 [Urine:750]    Safety Concerns:     [address] Safety Concerns:944831028}    Impairments/Disabilities: [facility] ANDI Impairments/Disabilities:098147225}    Nutrition Therapy:  Current Nutrition Therapy:   [facility] Diet List:324244177}    Routes of Feeding: {CHP DME Other Feedings:295769513}  Liquids: {Slp liquid thickness:53390}  Daily Fluid Restriction: {CHP DME Yes amt example:014149299}  Last Modified Barium Swallow with Video (Video Swallowing Test): {Done Not Done YBAY:635507899}    Treatments at the Time of Hospital Discharge:   Respiratory Treatments: ***  Oxygen Therapy:  {Therapy; copd oxygen:36419}  Ventilator:    {MH CC Vent VQQ}    Rehab Therapies: {THERAPEUTIC INTERVENTION:0868968349}  Weight Bearing Status/Restrictions: [facility] CC Weight Bearin}  Other Medical Equipment (for information only, NOT a DME order):  {EQUIPMENT:956038974}  Other Treatments: ***    Patient's personal belongings (please select all that are sent with patient):  {[facility] DME Belongings:690341780}    RN SIGNATURE:  {Esignature:083198470}    CASE MANAGEMENT/SOCIAL WORK SECTION    Inpatient Status Date: 2021    Readmission Risk Assessment Score:  Readmission Risk              Risk of Unplanned Readmission:  0           Discharging to Facility/ Agency   Name: [facility]  Address:  Phone:[phone]  Fax:[phone]    Dialysis Facility (if applicable)   Name:  Address:  Dialysis Schedule:  Phone:  Fax:    / signature: Electronically signed by SHERIE Luevano on 2021 at 12:22 PM    PHYSICIAN SECTION    Prognosis: Good    Condition at Discharge: Stable    Rehab Potential (if transferring to Rehab): Good    Recommended Labs or Other Treatments After Discharge:     Physician Certification: I certify the above information and transfer of Chas Bustos  is necessary for the continuing treatment of the diagnosis listed and that she requires [place] for less 30 days.      Update Admission H&P: No change in H&P    PHYSICIAN SIGNATURE:  Electronically signed by

## 2021-12-08 NOTE — CARE COORDINATION
Received call after hours from Rita Gonzalez at [facility] [place]. She requested demo's and clinicals be faxed to [phone]. Same completed this AM.  Per Maryanne, facility should be able to accept patient 12/8/2021 after review of documentation. She will need covid testing day of discharge. Favio Faria.  Campos, MSN, RN  [facility] [place] [address] Case Management  [phone]

## 2021-12-08 NOTE — PROGRESS NOTES
Department of Podiatry  Progress Note    SUBJECTIVE:   was evaluated at bedside this morning s/p LLE TAR , calc osteotomy, ORIF medial mal fracture (DOS: 12/6/21). No acute events overnight. Patient denies any N/V/D/F/C/SOB/CP and has no other pedal complaints at this time.      OBJECTIVE:    Scheduled Meds:   sodium chloride flush  5-40 mL IntraVENous 2 times per day    scopolamine  1 patch TransDERmal Q72H    sodium chloride flush  5-40 mL IntraVENous 2 times per day    rivaroxaban  10 mg Oral Daily    ceFAZolin  2,000 mg IntraVENous Q8H    topiramate  25 mg Oral BID    sertraline  150 mg Oral Daily    vitamin B-12  1,000 mcg Oral Daily    polyethylene glycol  17 g Oral BID    oxyCODONE  10 mg Oral 2 times per day    lidocaine  1 patch TransDERmal Daily    lamoTRIgine  100 mg Oral Nightly    levothyroxine  100 mcg Oral Daily    benztropine  2 mg Oral BID    cloZAPine  300 mg Oral Nightly    estradiol  1 patch TransDERmal Once per day on Sun Wed    famotidine  20 mg Oral BID    lactulose  10 g Oral Daily    insulin lispro  0-12 Units SubCUTAneous TID     insulin lispro  0-6 Units SubCUTAneous Nightly     Continuous Infusions:   sodium chloride      sodium chloride      dextrose       PRN Meds:.oxyCODONE HCl, sodium chloride flush, sodium chloride, sodium chloride flush, sodium chloride, ondansetron **OR** ondansetron, traMADol, ALPRAZolam, sucralfate, polyethylene glycol, acetaminophen **OR** acetaminophen, glucose, dextrose, glucagon (rDNA), dextrose, albuterol    Allergies   Allergen Reactions    Latex Rash     second degree burn    Fish Allergy Anaphylaxis    Adhesive Tape Other (See Comments)     Redness, blisters    Fentanyl Other (See Comments)     Fentanyl patch caused panic attack    Hydrocodone-Acetaminophen      GI BLEED    Iodine     Ketorolac Tromethamine Nausea Only    Medfix Ez [Wound Dressings]     Pregabalin Other (See Comments)    Tramadol Nausea Only    Buspirone Rash    Clindamycin Rash    Gabapentin Rash    Metronidazole Rash    Morphine Rash     IV \"a red line started going up towards my heart\"       /66   Pulse 89   Temp 98.7 °F (37.1 °C) (Oral)   Resp 16   Ht 5' 6\" (1.676 m)   Wt 224 lb (101.6 kg)   SpO2 100%   BMI 36.15 kg/m²       EXAM:  -Dressing today are clean, dry and intact without dishevelment. Cast intact  -Digits to the LLE are warm to touch. Patient able to move digits without pain. No discoloration or hyperpigmentation noted to digits. Cap refill time WNL. -No posterior calf pain on palpation.      Scheduled Meds:   sodium chloride flush  5-40 mL IntraVENous 2 times per day    scopolamine  1 patch TransDERmal Q72H    sodium chloride flush  5-40 mL IntraVENous 2 times per day    rivaroxaban  10 mg Oral Daily    ceFAZolin  2,000 mg IntraVENous Q8H    topiramate  25 mg Oral BID    sertraline  150 mg Oral Daily    vitamin B-12  1,000 mcg Oral Daily    polyethylene glycol  17 g Oral BID    oxyCODONE  10 mg Oral 2 times per day    lidocaine  1 patch TransDERmal Daily    lamoTRIgine  100 mg Oral Nightly    levothyroxine  100 mcg Oral Daily    benztropine  2 mg Oral BID    cloZAPine  300 mg Oral Nightly    estradiol  1 patch TransDERmal Once per day on Sun Wed    famotidine  20 mg Oral BID    lactulose  10 g Oral Daily    insulin lispro  0-12 Units SubCUTAneous TID WC    insulin lispro  0-6 Units SubCUTAneous Nightly     Continuous Infusions:   sodium chloride      sodium chloride      dextrose       PRN Meds:.oxyCODONE HCl, sodium chloride flush, sodium chloride, sodium chloride flush, sodium chloride, ondansetron **OR** ondansetron, traMADol, ALPRAZolam, sucralfate, polyethylene glycol, acetaminophen **OR** acetaminophen, glucose, dextrose, glucagon (rDNA), dextrose, albuterol    RADIOLOGY:  No orders to display     /66   Pulse 89   Temp 98.7 °F (37.1 °C) (Oral)   Resp 16   Ht 5' 6\" (1.676 m)   Wt 224 lb (101.6 kg)   SpO2 100%   BMI 36.15 kg/m²     LABS:    Recent Labs     12/07/21  0908   WBC 11.2   HGB 12.2   HCT 38.4           Recent Labs     12/07/21  0908      K 3.6      CO2 19*   BUN 10   CREATININE 0.7      No results for input(s): PROT, INR, APTT in the last 72 hours. ASSESSMENT:  1. LLE S/P TAR, Calc osteotomy, and ORIF medial mal fracture (DOS: 12/6/21)  2. LLE Pain   3. Trauma     History of total replacement of right ankle    Ankle arthritis    Status post osteotomy    Mild intermittent asthma    Acquired hypothyroidism        PLAN:  - Patient was examined and evaluated. Reviewed patient's recent lab results and pertinent diagnostic imaging. Reviewed ancillary service notes. - Dressings this morning are clean, dry and intact without dishevelement. Will keep on until office visit   - Pain Control: IV and PO.   - Orders at [facility]: 1. Doxycycline   2. Oxycodone   3. Xarelto   - Cultures : Pending  - Surgical path: Pending  - X-rays: Intra-op  - Discussed patient with Stormy Nguyen and [address] [place] [address]   - Will continue to follow patient while they are in-house.

## 2021-12-08 NOTE — CARE COORDINATION
Social work:    Social service faxed medical to Blanca Carlson at [facility] who accepted. Physician ambulance was arranged for 7:30 p.m. transfer. Patient and father Alfredo Osman, as well as Maryanne at [facility] are aware.     Electronically signed by SHERIE Martin on 12/8/2021 at 12:25 PM

## 2021-12-08 NOTE — DISCHARGE SUMMARY
[facility] [place] Physician Discharge Summary       No follow-up provider specified. Activity level: As tolerated, left lower extremity at this time in none weightbearing status. Diet: ADULT DIET; Regular; 4 carb choices (60 gm/meal)    Labs:    Dispo: SNF    Condition at discharge: Stable    Continue supplemental oxygen via nasal canula @ 2 LPM round-the-clock. Continue CPAP / BiPAP during sleep as prior to admission. Patient ID:  Eloina Duarte  [id number]  37 y.o.  1980    Admit date: 12/6/2021    Discharge date and time:  12/8/2021  10:38 AM    Admission Diagnoses: Principal Problem:    History of total replacement of right ankle  Active Problems:    Ankle arthritis    Status post osteotomy    Mild intermittent asthma    Acquired hypothyroidism  Resolved Problems:    * No resolved hospital problems. *      Discharge Diagnoses: Principal Problem:    History of total replacement of right ankle  Active Problems:    Ankle arthritis    Status post osteotomy    Mild intermittent asthma    Acquired hypothyroidism  Resolved Problems:    * No resolved hospital problems. *      Consults:  IP CONSULT TO SOCIAL WORK    Procedures: Left ankle replacement    Hospital Course: Patient was admitted with Ankle arthritis [M19.079]. Patient Admitted on 12th, presented as needed left ankle surgery, known asthma, hypothyroidism, diabetes, prior ORIF, needed hardware removal, came for another elective surgery. Patient had left ankle replacement. Left calcaneal osteotomy. Covid is already ordered, patient at this time is in nonweightbearing status. As per case management notes ECF should be able to take her today. As of now patient is resting but wakes up easily, awake, alert, still complains of a lot of pain, does understand that her pain control has been maximized, will continue with the DC process for patient to be able to go to [facility] for further care.  Podiatry has taken care for her prescriptions for doxycycline, Xarelto and oxycodone.     Discharge Exam:  Vitals:    12/07/21 2012 12/08/21 0006 12/08/21 0458 12/08/21 0739   BP: 122/89 (!) 117/58 117/68 127/66   Pulse: 91 95 92 89   Resp: 18  16    Temp: 98.2 °F (36.8 °C) 98.2 °F (36.8 °C)  98.7 °F (37.1 °C)   TempSrc:    Oral   SpO2: 100% 99% 98% 100%   Weight:       Height:           General Appearance: alert and oriented to person, place and time, well-developed and well-nourished, in no acute distress  Skin: warm and dry, no rash or erythema  Head: normocephalic and atraumatic  Eyes: pupils equal, round, and reactive to light, extraocular eye movements intact, conjunctivae normal  ENT: tympanic membrane, external ear and ear canal normal bilaterally, oropharynx clear and moist with normal mucous membranes  Neck: neck supple and non tender without mass, no thyromegaly or thyroid nodules, no cervical lymphadenopathy   Pulmonary/Chest: clear to auscultation bilaterally- no wheezes, rales or rhonchi, normal air movement, no respiratory distress  Cardiovascular: normal rate, normal S1 and S2, no gallops, intact distal pulses and no carotid bruits  Abdomen: soft, non-tender, non-distended, normal bowel sounds, no masses or organomegaly  I/O last 3 completed shifts:  In: -   Out: 750 [Urine:750]  I/O this shift:  In: 300 [P.O.:300]  Out: -       LABS:  Recent Labs     12/07/21  0908      K 3.6      CO2 19*   BUN 10   CREATININE 0.7   GLUCOSE 137*   CALCIUM 7.8*       Recent Labs     12/07/21  0908   WBC 11.2   RBC 4.43   HGB 12.2   HCT 38.4   MCV 86.7   MCH 27.5   MCHC 31.8*   RDW 14.6      MPV 9.3       Imaging:   No orders to display       Patient Instructions:      Medication List      START taking these medications    doxycycline hyclate 100 MG tablet  Commonly known as: VIBRA-TABS  Take 1 tablet by mouth 2 times daily for 14 days     rivaroxaban 10 MG Tabs tablet  Commonly known as: XARELTO  Take 1 tablet by mouth daily traMADol 50 MG tablet  Commonly known as: ULTRAM  Take 1 tablet by mouth every 8 hours as needed for Pain for up to 3 days.         CONTINUE taking these medications    acarbose 100 MG tablet  Commonly known as: PRECOSE     ALPRAZolam 0.5 MG tablet  Commonly known as: XANAX     benztropine 2 MG tablet  Commonly known as: COGENTIN     cloZAPine 100 MG tablet  Commonly known as: CLOZARIL     EpiPen Jr 2-Napoleon 0.15 MG/0.3ML Soaj  Generic drug: EPINEPHrine     estradiol 0.1 MG/24HR  Commonly known as: VIVELLE     famotidine 20 MG tablet  Commonly known as: PEPCID     HM Lidocaine Patch 4 % external patch  Generic drug: lidocaine     lactulose 10 g packet  Commonly known as: CEPHULAC     lamoTRIgine 100 MG tablet  Commonly known as: LAMICTAL     oxyCODONE HCl 10 MG immediate release tablet  Commonly known as: OXY-IR     polyethylene glycol 17 g Pack packet  Commonly known as: MIRALAX     Proventil  (90 Base) MCG/ACT inhaler  Generic drug: albuterol sulfate HFA     RA Vitamin B-12 TR 1000 MCG Tbcr  Generic drug: Cyanocobalamin ER     sertraline 100 MG tablet  Commonly known as: ZOLOFT     sucralfate 1 GM/10ML suspension  Commonly known as: CARAFATE     Synthroid 100 MCG tablet  Generic drug: levothyroxine     tiZANidine 2 MG tablet  Commonly known as: ZANAFLEX     Topamax 25 MG tablet  Generic drug: topiramate     vitamin D 1.25 MG (00695 UT) Caps capsule  Commonly known as: ERGOCALCIFEROL     Xtampza ER 13.5 MG C12a  Generic drug: oxyCODONE ER        STOP taking these medications    omeprazole 20 MG delayed release capsule  Commonly known as: PRILOSEC           Where to Get Your Medications      You can get these medications from any pharmacy    Bring a paper prescription for each of these medications  · traMADol 50 MG tablet     Information about where to get these medications is not yet available    Ask your nurse or doctor about these medications  · doxycycline hyclate 100 MG tablet  · rivaroxaban 10 MG Tabs tablet           Note that more than 30 minutes was spent in preparing discharge papers, discussing discharge with patient, medication review, etc.    Signed:  Electronically signed by Marcelo Hernandez MD on 12/8/2021 at 10:38 AM    NOTE: This report was transcribed using voice recognition software. Every effort was made to ensure accuracy; however, inadvertent computerized transcription errors may be present.

## 2021-12-08 NOTE — PROGRESS NOTES
Physical Therapy  Facility/Department: [facility]  ORTHO SURGERY  Daily Treatment Note  NAME: Aleksey Haro  : 1980  MRN: [id number]    Date of Service: 2021      Patient Diagnosis(es): The encounter diagnosis was Status post osteotomy. 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 has a past surgical history that includes Gastric bypass surgery (); [place] tooth extraction (); Rotator cuff repair (Right, ); Carpal tunnel release (Left, );  section (); Tubal ligation (); hernia repair (); Ovary removal (); vagal nerve stimulation (); Shoulder arthroscopy (Right); Temporomandibular joint surgery (Left, ); Temporomandibular joint surgery (Bilateral, ); Endometrial ablation (); Hysterectomy (); other surgical history (); Ankle surgery (Left, ); Rotator cuff repair (Right, ); Knee arthroscopy (Left, ); Knee arthroscopy (Right); Leg Tendon Surgery (Left); Foot Fusion; Foot fracture surgery (Left, 2019); and other surgical history (Left, 2020).       Evaluating PT:  Radha Paulino PT, DPT [id number]        Room #:  2637/2884-M  Diagnosis:  Ankle arthritis [M19.079]  PMHx/PSHx:          Past Medical History            Date Comments     Atypical nevi [D22.9]   SEV ATN L flank 2019, Mod to Sev ATN L mid back 2019     Asthma [J45.909]         Hypoglycemia [E16.2]         Seizure ([address] [R56.9]   if sugar drops     Paranoid schizophrenia ([facility] [address] [facility] [F20.0]         Generalized anxiety disorder [F41.1]         Borderline personality disorder (HCC) [F60.3]         Bipolar 1 disorder (HCC) [F31.9]         Agoraphobia [F40.00]         Claustrophobia [F40.240]         Severe depression (HCC) [F32.2]         Fibromyalgia [M79.7]         Hidradenitis suppurativa [L73.2]   usually axillas     PONV (postoperative nausea and vomiting) [R11.2, Z98.890]         Arthritis [M19.90]         Thyroid disease [E07.9]         Other manic episodes (HCC) [F30.8]         Dissociative disorder [F44.9]         Osteopenia [M85.80]         IBS (irritable bowel syndrome) [K58.9]         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]   SCHEDULED   FOR THE SURGERY ON 21                Past Surgical History          Laterality Date Comments   Gastric bypass surgery [SHX52]        [place] tooth extraction [SHX21]        Rotator cuff repair [BXA274] Right      Carpal tunnel release [QOQ596] Left       section [SHX87]        Tubal ligation [SHX77]   2004     HERNIA REPAIR [SHX51]   2005 left abdomen   Ovary removal [SHX86]   2006     VAGAL NERVE STIMULATION [ENB559]   2006 battery, removed    Shoulder arthroscopy [QND081] Right       Temporomandibular joint surgery [SHX35] Left 2008     Temporomandibular joint surgery [SHX35] Bilateral      Endometrial ablation [FIS588]   2009 [facility]   Hysterectomy [SHX81]   2009     OTHER SURGICAL HISTORY    2009 staples removed from hernia   Ankle surgery [QWM915] Left 2009     Rotator cuff repair [PCV746] Right 2012 revision   Knee arthroscopy [OKS053] Left 2012     Knee arthroscopy [PAP379] Right   x 2 in 2014   Leg Tendon Surgery [NZF8716] Left       Foot Fusion [OVJ927]      x3    Foot fracture surgery [XTG962] Left 2019 REMOVAL PAINFUL FIXATION THIRD TOE LEFT FOOT performed by Blue Lozano DPM at [facility] Left 2020 dr escalera- excision dysplactis lesionx2 left flank and back          Procedure/Surgery:  12/6/2021 TAR (Total ankle replacement of the left lower extremity 2. Left calcaneal slide osteotomy 3. ORIF of iatrogenic medial malleolar fracture left lower extremity)  Precautions:  Falls, NWB LLE  Equipment Needs:  Has [employer], SPC     SUBJECTIVE:     Pt lives with her parents in a 1 story home with 3+1 stairs to enter and grab bars. Bed is on 1st floor and bath is on 1st floor. Pt ambulated with a SPC PTA.      OBJECTIVE:    Initial Evaluation  Date: 12/7/2021 Treatment Date:  12/8/2021 Short Term/ Long Term   Goals   AM-PAC 6 Clicks 88/62  62/ 24      Was pt agreeable to Eval/treatment? Yes  yes      Does pt have pain? Reported pain in L ankle intermittently. Did not quantify.  L ankle pain      Bed Mobility  Rolling: SBA  Supine to sit: SBA  Sit to supine: SBA  Scooting: SBA  Supine <> sit : S/I Rolling: Independent  Supine to sit: Independent  Sit to supine: Independent  Scooting: Independent   Transfers Sit to stand: Min A  Stand to sit: Min A  Stand pivot: Min A Sit <>stand and SPS : SBA/CGA  Sit to stand: SBA  Stand to sit: SBA  Stand pivot: SBA   Ambulation    NT, pt reported not able to \"hop\". Pt able to shuffle on R foot.  NT >5 feet with [employer] with Min A   Stair negotiation: ascended and descended  NT   TBD   ROM BUE:  Per OT  BLE:  WFL, L ankle casted       Strength BUE:  Per OT  BLE:  RLE 4-/5, LLE hip/knee 4-/5   Increase strength 1/3 grade.    Balance Sitting EOB:  SBA  Dynamic Standing:  Min A with [employer]   Sitting EOB:  Independent  Dynamic Standing:  Min A with [employer]      Pt is A & O x 4  Sensation:  Pt denies numbness and tingling to extremities  Edema:  None noted     Patient education  Pt educated on fall risk, safety with mobility, L LE NWB      Patient response to education:   Pt verbalized understanding Pt demonstrated skill Pt requires further education in this area   Yes  Yes with verbal cues and assist Yes/Reinforcement [x]? Patient/Caregiver Education   []? HEP  []? Other      PT long term treatment goals are located in above grid     Frequency of treatments: 2-5x/week x 1-2 weeks.     Time in  1335  Time out  1347     Total Treatment Time  12 minutes      Con't with PT POC      CPT codes:  []? Low Complexity PT evaluation [id number]  []? Moderate Complexity PT evaluation 76893  []? High Complexity PT evaluation [id number]  []? PT Re-evaluation [id number]  []? Gait training 95709 - minutes  []? Manual therapy 35766 - minutes  [x]? Therapeutic activities 36752 12 minutes  []? Therapeutic exercises 17705 - minutes  []?  Neuromuscular reeducation 17121 - minutes       Ann Escobedo   PT 2585

## 2021-12-08 NOTE — OP NOTE
[address] [address]                                OPERATIVE REPORT    PATIENT NAME: Mayelin Garcia                        :        1980  MED REC NO:   [id number]                            ROOM:       7167  ACCOUNT NO:   [de-identified]                           ADMIT DATE: 2021  PROVIDER:     Owen Galeana DPM    DATE OF PROCEDURE:  2021    INDICATION NOTE:  The patient presents for followup regarding ongoing  left painful ankle and foot. She apparently underwent three previous surgeries  to accomplished ankle fusion. Subsequently, she is in malposition  developing secondary arthritis to adjacent joints and causing pain with standing,  ambulation, and normal activity. With this understanding, she is set up  for surgery on 2021, at [facility] for a total ankle  replacement and calcaneal osteotomy. With this understanding, she is  aware of pros, cons, risks, benefits, overcorrection, undercorrection,  recurrence, numbness, infection, worsening of it, limb loss, DVT, PE,  anything can happen, nothing should happen. With this understanding,  she agreed to the consent, site marking, and perioperative management  understanding the pros, cons, risks, and benefits. She understands we  may take out the remaining portion of hardware if needed and also may  have to perform calcaneal osteotomy. Additionally, she understands she  may need additional surgery such as a midfoot fusion as she has a pes  planovalgus deformity into her midfoot. She agreed to the consent, site  marking, and perioperative management. Her father was present in the  preop holding area and agreed as well. LOWER EXTREMITY PHYSICAL EXAMINATION:  VASCULAR:  Intact pedal pulses, 2/4 DP and PT. Good capillary refill  time.   NEUROLOGIC:  She has decreased epicritic sensation secondary to multiple  procedures being done on her left lower extremity previously. DERMATOLOGIC:  Her skin integrity is intact. All wounds are well  healed. There are no open wounds or lesions. MUSCULOSKELETAL:  She has pain upon her subtalar joint and midtarsal  joint secondary to her ankle joint being locked up and in a malposition. ORTHOPEDIC: She also has a pes planovalgus deformity. She has a calcaneal varus and  she has a painful hardware in her talar body and neck area. SURGEON:  Irma Marin DPM.    ASSISTANTS:  1.  Dr. William Porter, fellow. 2.  Thao Polk, PGY-3.    PREOPERATIVE DIAGNOSES:  1.  Osteoarthritis with an ankle fusion, left lower extremity. 2.  Calcaneal varus, left lower extremity. 3.  Painful hardware, left lower extremity. POSTOPERATIVE DIAGNOSES:  1.  Osteoarthritis with an ankle fusion, left lower extremity. 2.  Calcaneal varus, left lower extremity. 3.  Painful hardware, left lower extremity. 4.  Medial malleolar fracture. PROCEDURES PERFORMED:  1. Conversion of a total ankle fusion to a total ankle arthroplasty,  left with a size 0 tibia, a 13-mm poly and a size 1 left Talus Ghotra and  [employer] XT implant. 2.  Calcaneal osteotomy slid laterally. 3.  Removal of hardware of the left talus. 4.  Open reduction and internal fixation of medial malleolus. DESCRIPTION OF PROCEDURE:  The patient was seen in the preop holding  area. Appropriate site marking was performed. She agreed to consent,  site marking, and perioperative management. She was brought into the  OR, placed well padded on a OR table, where anesthesia was achieved. Once the anesthesia was achieved, appropriate time-out was performed and  everybody in the room concurred. Her left foot and leg were prepped and  draped in the usual sterile fashion accomplishing hemostasis via  midthigh tourniquet inflated to 300 mmHg. Attention was directed to the  left lower extremity and was evaluated.   At this time, she was noted to  be in calcaneal varus and it was decided to perform a calcaneal  osteotomy percutaneously sliding the calcaneus laterally. PROCEDURE #1:  Calcaneal displacement osteotomy done percutaneously. A stab incision  was made inferomedial at the junction of anterior border of calcaneus  and subfascial dissection was performed along the medial calcaneal bone  and tenting the skin superomedially inferior to Achilles tendon,  posterior to neurovascular bundle. A stab incision was made. The  hemostat was switched to tonsil stat and the loose end of the  Gigli saw was obtained from the superomedial to inferomedial.  Next, a  straight hemostat was used across from superomedial, inferior to  Achilles tendon, posterior to neurovascular bundle along the superior  aspect of the calcaneus tenting the skin laterally. A stab incision was  made laterally. At this time, the hemostat was withdrawn inferomedially  and then from lateral to medial until the loose end of the Gigli saw  superomedial and pulled it out superolateral.  Next, a stab incision was  made inferolateral adjacent to the inferomedial one. It was deepened in  the same plane using sharp and blunt dissection avoiding neurovascular  structures, carried down to the bone. At this time, subfascial  dissection was performed along the lateral wall of the calcaneus,  tenting the skin and exiting out superolateral.  Loose end of the Gigli  saw was obtained and pulled through inferolateral.  Next, a calcaneal  osteotomy was made percutaneously. The Gigli saw was cut and was slid  approximately 1 cm laterally. It was fixated with two large cancellous  screws with an interfrag compression. The skin was closed using 3-0  Prolene. PROCEDURE #2:  Removal of painful hardware. At this point in time, a old broken   screw was noted to be in the pathway of where the implant was _____. This was a broken screw.   Incision was made over the bone of the talus  and carried down to the bone. At this point in time, this was resected  and removed in total.    PROCEDURE #3:  Total ankle arthroplasty, conversion from an ankle  arthrodesis to a total ankle replacement. Incision was made along the  anterior tibial crest just lateral to the tibial crest and a  full-thickness incision was carried down to the bone avoiding the  neurovascular structures. The entire neurovascular structures, muscles,  and tendons were carried laterally down to the foot into the dorsal  midfoot. At this time, we had good exposure to the ankle. Next,  alignment jig was set up from the tibial tubercle, down to the ankle was  set and at this point in time, a size 0 tibial tray and a size 1 talus  flat talar component was selected and a 13 mm was selected. The  appropriate steps were gone through cutting out the block of bone. We  checked different sizes and this gave the patient best range of motion  and stability with a combination of size 0 tibial tray, size 13-mm poly,  and a talus size 1 flat. This was inserted and then the deep tissues  were closed using 0 Vicryl and the skin was closed using 2-0 nylon. Intraoperatively, a medial malleolar fracture occurred and needed  fixated. PROCEDURE #4:  Open reduction and internal fixation of medial malleolus. At this time, under fluoroscopic guidance, an incision was made  inferomedial to the medial malleolus. It was deepened in the same plane  using sharp and blunt dissection avoiding neurovascular structures. At  this point in time, a 7-hole Synthes plate was used. It was bent and  turned to 8-hole plate and an interfrag compression screw plus two lag  screws were used for fixation in good anatomic alignment. At two hours,  the tourniquet had been dropped and good capillary refill time was noted  to return to all digits of the left foot.   Hemostasis had been  accomplished while the tourniquet had been dropped and we felt there was  no need for drain

## 2021-12-09 PROCEDURE — G0378 HOSPITAL OBSERVATION PER HR: HCPCS

## 2021-12-28 LAB
ALBUMIN SERPL-MCNC: 3.6 G/DL (ref 3.5–5.2)
ALP BLD-CCNC: 155 U/L (ref 35–104)
ALT SERPL-CCNC: 13 U/L (ref 0–32)
ANION GAP SERPL CALCULATED.3IONS-SCNC: 9 MMOL/L (ref 7–16)
AST SERPL-CCNC: 21 U/L (ref 0–31)
BASOPHILS ABSOLUTE: 0.04 E9/L (ref 0–0.2)
BASOPHILS RELATIVE PERCENT: 0.6 % (ref 0–2)
BILIRUB SERPL-MCNC: 0.2 MG/DL (ref 0–1.2)
BUN BLDV-MCNC: 7 MG/DL (ref 6–20)
C-REACTIVE PROTEIN: 0.3 MG/DL (ref 0–0.4)
CALCIUM SERPL-MCNC: 9.4 MG/DL (ref 8.6–10.2)
CHLORIDE BLD-SCNC: 105 MMOL/L (ref 98–107)
CO2: 23 MMOL/L (ref 22–29)
CREAT SERPL-MCNC: 0.8 MG/DL (ref 0.5–1)
EOSINOPHILS ABSOLUTE: 0.08 E9/L (ref 0.05–0.5)
EOSINOPHILS RELATIVE PERCENT: 1.1 % (ref 0–6)
GFR AFRICAN AMERICAN: >60
GFR NON-AFRICAN AMERICAN: >60 ML/MIN/1.73
GLUCOSE BLD-MCNC: 99 MG/DL (ref 74–99)
HCT VFR BLD CALC: 37.3 % (ref 34–48)
HEMOGLOBIN: 11.5 G/DL (ref 11.5–15.5)
IMMATURE GRANULOCYTES #: 0.03 E9/L
IMMATURE GRANULOCYTES %: 0.4 % (ref 0–5)
LYMPHOCYTES ABSOLUTE: 1.42 E9/L (ref 1.5–4)
LYMPHOCYTES RELATIVE PERCENT: 20.2 % (ref 20–42)
MCH RBC QN AUTO: 28.1 PG (ref 26–35)
MCHC RBC AUTO-ENTMCNC: 30.8 % (ref 32–34.5)
MCV RBC AUTO: 91.2 FL (ref 80–99.9)
MONOCYTES ABSOLUTE: 0.6 E9/L (ref 0.1–0.95)
MONOCYTES RELATIVE PERCENT: 8.5 % (ref 2–12)
NEUTROPHILS ABSOLUTE: 4.86 E9/L (ref 1.8–7.3)
NEUTROPHILS RELATIVE PERCENT: 69.2 % (ref 43–80)
PDW BLD-RTO: 14.1 FL (ref 11.5–15)
PLATELET # BLD: 417 E9/L (ref 130–450)
PMV BLD AUTO: 10.1 FL (ref 7–12)
POTASSIUM SERPL-SCNC: 4.7 MMOL/L (ref 3.5–5)
RBC # BLD: 4.09 E12/L (ref 3.5–5.5)
SEDIMENTATION RATE, ERYTHROCYTE: 48 MM/HR (ref 0–20)
SODIUM BLD-SCNC: 137 MMOL/L (ref 132–146)
TOTAL PROTEIN: 6.5 G/DL (ref 6.4–8.3)
WBC # BLD: 7 E9/L (ref 4.5–11.5)

## 2021-12-29 LAB — GRAM STAIN ORDERABLE: NORMAL

## 2021-12-31 LAB
ANAEROBIC CULTURE: NORMAL
WOUND/ABSCESS: NORMAL

## 2022-01-12 LAB — GRAM STAIN ORDERABLE: NORMAL

## 2022-01-14 LAB
ANAEROBIC CULTURE: NORMAL
WOUND/ABSCESS: NORMAL

## 2022-01-18 LAB
ALBUMIN SERPL-MCNC: 3.7 G/DL (ref 3.5–5.2)
ALP BLD-CCNC: 109 U/L (ref 35–104)
ALT SERPL-CCNC: 7 U/L (ref 0–32)
ANION GAP SERPL CALCULATED.3IONS-SCNC: 10 MMOL/L (ref 7–16)
AST SERPL-CCNC: 10 U/L (ref 0–31)
BASOPHILS ABSOLUTE: 0.04 E9/L (ref 0–0.2)
BASOPHILS RELATIVE PERCENT: 0.6 % (ref 0–2)
BILIRUB SERPL-MCNC: <0.2 MG/DL (ref 0–1.2)
BUN BLDV-MCNC: 6 MG/DL (ref 6–20)
C-REACTIVE PROTEIN: 0.3 MG/DL (ref 0–0.4)
CALCIUM SERPL-MCNC: 9.5 MG/DL (ref 8.6–10.2)
CHLORIDE BLD-SCNC: 105 MMOL/L (ref 98–107)
CO2: 24 MMOL/L (ref 22–29)
CREAT SERPL-MCNC: 0.7 MG/DL (ref 0.5–1)
EOSINOPHILS ABSOLUTE: 0.12 E9/L (ref 0.05–0.5)
EOSINOPHILS RELATIVE PERCENT: 1.7 % (ref 0–6)
GFR AFRICAN AMERICAN: >60
GFR NON-AFRICAN AMERICAN: >60 ML/MIN/1.73
GLUCOSE BLD-MCNC: 88 MG/DL (ref 74–99)
HCT VFR BLD CALC: 36.7 % (ref 34–48)
HEMOGLOBIN: 11.3 G/DL (ref 11.5–15.5)
IMMATURE GRANULOCYTES #: 0.06 E9/L
IMMATURE GRANULOCYTES %: 0.8 % (ref 0–5)
LYMPHOCYTES ABSOLUTE: 1.95 E9/L (ref 1.5–4)
LYMPHOCYTES RELATIVE PERCENT: 27.6 % (ref 20–42)
MCH RBC QN AUTO: 28 PG (ref 26–35)
MCHC RBC AUTO-ENTMCNC: 30.8 % (ref 32–34.5)
MCV RBC AUTO: 91.1 FL (ref 80–99.9)
MONOCYTES ABSOLUTE: 0.58 E9/L (ref 0.1–0.95)
MONOCYTES RELATIVE PERCENT: 8.2 % (ref 2–12)
NEUTROPHILS ABSOLUTE: 4.32 E9/L (ref 1.8–7.3)
NEUTROPHILS RELATIVE PERCENT: 61.1 % (ref 43–80)
PDW BLD-RTO: 14.2 FL (ref 11.5–15)
PLATELET # BLD: 408 E9/L (ref 130–450)
PMV BLD AUTO: 10.7 FL (ref 7–12)
POTASSIUM SERPL-SCNC: 4.9 MMOL/L (ref 3.5–5)
RBC # BLD: 4.03 E12/L (ref 3.5–5.5)
SODIUM BLD-SCNC: 139 MMOL/L (ref 132–146)
TOTAL PROTEIN: 6.6 G/DL (ref 6.4–8.3)
WBC # BLD: 7.1 E9/L (ref 4.5–11.5)

## 2022-01-19 LAB — SEDIMENTATION RATE, ERYTHROCYTE: 39 MM/HR (ref 0–20)

## 2022-01-26 LAB — GRAM STAIN ORDERABLE: NORMAL

## 2022-01-27 ENCOUNTER — ANESTHESIA EVENT (OUTPATIENT)
Dept: OPERATING ROOM | Age: 42
End: 2022-01-27
Payer: MEDICARE

## 2022-01-27 NOTE — PROGRESS NOTES
[facility] PRE-ADMISSION TESTING INSTRUCTIONS    The Preadmission Testing patient is instructed accordingly using the following criteria (check applicable):    ARRIVAL INSTRUCTIONS:  [x] Parking the day of Surgery is located in the Main Entrance lot. Upon entering the door, make an immediate right to the surgery reception desk    [x] Bring photo ID and insurance card    [] Bring in a copy of Living will or Durable Power of  papers. [x] Please be sure to arrange for responsible adult to provide transportation to and from the hospital    [x] Please arrange for responsible adult to be with you for the 24 hour period post procedure due to having anesthesia      GENERAL INSTRUCTIONS:    [x] Nothing by mouth after midnight, including gum, candy, mints or water    [x] You may brush your teeth, but do not swallow any water    [x] Take medications as instructed with 1-2 oz of water    [x] Stop herbal supplements and vitamins 5 days prior to procedure    [] Follow preop dosing of blood thinners per physician instructions    [] Take 1/2 dose of evening insulin, but no insulin after midnight    [] No oral diabetic medications after midnight    [] If diabetic and have low blood sugar or feel symptomatic, take 1-2oz apple juice only    [] Bring inhalers day of surgery    [] Bring C-PAP/ Bi-Pap day of surgery    [] Bring urine specimen day of surgery    [x] Shower or bath with soap, lather and rinse well, AM of Surgery, no lotion, powders or creams to surgical site    [] Follow bowel prep as instructed per surgeon    [x] No tobacco products within 24 hours of surgery     [x] No alcohol or illegal drug use within 24 hours of surgery.     [x] Jewelry, body piercing's, eyeglasses, contact lenses and dentures are not permitted into surgery (bring cases)      [x] Please do not wear any nail polish, make up or hair products on the day of surgery    [] You can expect a call the business day prior to procedure to notify you if your arrival time changes    [] If you receive a survey after surgery we would greatly appreciate your comments    [] Parent/guardian of a minor must accompany their child and remain on the premises  the entire time they are under our care     [] Pediatric patients may bring favorite toy, blanket or comfort item with them    [] A caregiver or family member must remain with the patient during their stay if they are mentally handicapped, have dementia, disoriented or unable to use a call light or would be a safety concern if left unattended    [x] Please notify surgeon if you develop any illness between now and time of surgery (cold, cough, sore throat, fever, nausea, vomiting) or any signs of infections  including skin, wounds, and dental.    []  The Outpatient Pharmacy is available to fill your prescription here on your day of surgery, ask your preop nurse for details    [] Other instructions    EDUCATIONAL MATERIALS PROVIDED:    [] PAT Preoperative Education Packet/Booklet     [] Medication List    [] Transfusion bracelet applied with instructions    [] Shower with soap, lather and rinse well, and use CHG wipes provided the evening before surgery as instructed    [] Incentive spirometer with instructions

## 2022-01-27 NOTE — PROGRESS NOTES
Have you been tested for COVID  No  vaccinated         Have you been told you were positive for COVID No  Have you had any known exposure to someone that is positive for COVID No  Do you have a cough                   No              Do you have shortness of breath No                 Do you have a sore throat            No                Are you having chills                    No                Are you having muscle aches. No                    Please come to the hospital wearing a mask and have your significant other wear a mask as well. Both of you should check your temperature before leaving to come here,  if it is 100 or higher please call [phone] for instruction.

## 2022-01-28 LAB — WOUND/ABSCESS: NORMAL

## 2022-01-28 RX ORDER — ACETAMINOPHEN 325 MG/1
650 TABLET ORAL EVERY 4 HOURS PRN
COMMUNITY

## 2022-01-28 NOTE — PROGRESS NOTES
Spoke with Dr. Darshana Cameron re: pt oxycodone ER and IR . He wants pt to take them DOS as she normally does.

## 2022-01-28 NOTE — PROGRESS NOTES
Called [facility] facility again reuqesting front sheets, Diagnosis sheet, DPOA documents, MAR/allergies documents which we have called several times for. . Just spoke with Vicky Dickerson (medical records) she states she will get this information to us soon.

## 2022-01-28 NOTE — PROGRESS NOTES
Spoke with Penelope dover to fax pt information to us this morning  So as we can prepare pts chart for DOS 1/31/2022

## 2022-01-28 NOTE — PROGRESS NOTES
Pt resides at [facility] living [facility] in [place], [place]. .Obtained information from Js Miller, Nurse. . She is alert, oriented signs for herself. Her father is POA ([place] [phone]) and wiil accompany pt to hospital DOS 1/31/2022. She is a FULL CODE. Bhavik Myers No isolation,wound left lower extremity. No drains/tubes or oxygen. Able to use call light independently. .. she is independent and able to transfer, pivot herself. Pt has a H/O seizures, last was greater than 5 years ago. and H/O gastric bypass surgery.

## 2022-01-29 LAB
ANAEROBIC CULTURE: ABNORMAL
ORGANISM: ABNORMAL

## 2022-01-31 ENCOUNTER — ANESTHESIA (OUTPATIENT)
Dept: OPERATING ROOM | Age: 42
End: 2022-01-31
Payer: MEDICARE

## 2022-01-31 ENCOUNTER — HOSPITAL ENCOUNTER (OUTPATIENT)
Age: 42
Setting detail: OUTPATIENT SURGERY
Discharge: HOME | End: 2022-01-31
Attending: PODIATRIST | Admitting: PODIATRIST
Payer: MEDICARE

## 2022-01-31 VITALS
BODY MASS INDEX: 35.84 KG/M2 | RESPIRATION RATE: 16 BRPM | HEIGHT: 66 IN | HEART RATE: 71 BPM | OXYGEN SATURATION: 97 % | TEMPERATURE: 97.5 F | WEIGHT: 223 LBS | SYSTOLIC BLOOD PRESSURE: 137 MMHG | DIASTOLIC BLOOD PRESSURE: 69 MMHG

## 2022-01-31 VITALS — SYSTOLIC BLOOD PRESSURE: 106 MMHG | DIASTOLIC BLOOD PRESSURE: 57 MMHG | TEMPERATURE: 96.5 F | OXYGEN SATURATION: 97 %

## 2022-01-31 LAB
METER GLUCOSE: 86 MG/DL (ref 74–99)
METER GLUCOSE: 97 MG/DL (ref 74–99)

## 2022-01-31 PROCEDURE — 6370000000 HC RX 637 (ALT 250 FOR IP): Performed by: PODIATRIST

## 2022-01-31 PROCEDURE — 2500000003 HC RX 250 WO HCPCS: Performed by: PODIATRIST

## 2022-01-31 PROCEDURE — 2500000003 HC RX 250 WO HCPCS

## 2022-01-31 PROCEDURE — 3700000001 HC ADD 15 MINUTES (ANESTHESIA): Performed by: PODIATRIST

## 2022-01-31 PROCEDURE — 2709999900 HC NON-CHARGEABLE SUPPLY: Performed by: PODIATRIST

## 2022-01-31 PROCEDURE — 6360000002 HC RX W HCPCS: Performed by: ANESTHESIOLOGIST ASSISTANT

## 2022-01-31 PROCEDURE — 7100000011 HC PHASE II RECOVERY - ADDTL 15 MIN: Performed by: PODIATRIST

## 2022-01-31 PROCEDURE — 87102 FUNGUS ISOLATION CULTURE: CPT

## 2022-01-31 PROCEDURE — 2580000003 HC RX 258: Performed by: ANESTHESIOLOGIST ASSISTANT

## 2022-01-31 PROCEDURE — 87206 SMEAR FLUORESCENT/ACID STAI: CPT

## 2022-01-31 PROCEDURE — 3700000000 HC ANESTHESIA ATTENDED CARE: Performed by: PODIATRIST

## 2022-01-31 PROCEDURE — 87075 CULTR BACTERIA EXCEPT BLOOD: CPT

## 2022-01-31 PROCEDURE — 6370000000 HC RX 637 (ALT 250 FOR IP): Performed by: ANESTHESIOLOGY

## 2022-01-31 PROCEDURE — 6360000002 HC RX W HCPCS: Performed by: PODIATRIST

## 2022-01-31 PROCEDURE — 87015 SPECIMEN INFECT AGNT CONCNTJ: CPT

## 2022-01-31 PROCEDURE — 3600000012 HC SURGERY LEVEL 2 ADDTL 15MIN: Performed by: PODIATRIST

## 2022-01-31 PROCEDURE — 7100000010 HC PHASE II RECOVERY - FIRST 15 MIN: Performed by: PODIATRIST

## 2022-01-31 PROCEDURE — 3600000002 HC SURGERY LEVEL 2 BASE: Performed by: PODIATRIST

## 2022-01-31 PROCEDURE — 87205 SMEAR GRAM STAIN: CPT

## 2022-01-31 PROCEDURE — 2720000010 HC SURG SUPPLY STERILE: Performed by: PODIATRIST

## 2022-01-31 PROCEDURE — 87116 MYCOBACTERIA CULTURE: CPT

## 2022-01-31 PROCEDURE — 87070 CULTURE OTHR SPECIMN AEROBIC: CPT

## 2022-01-31 PROCEDURE — 82962 GLUCOSE BLOOD TEST: CPT

## 2022-01-31 RX ORDER — HYDROMORPHONE HCL 110MG/55ML
PATIENT CONTROLLED ANALGESIA SYRINGE INTRAVENOUS PRN
Status: DISCONTINUED | OUTPATIENT
Start: 2022-01-31 | End: 2022-01-31 | Stop reason: SDUPTHER

## 2022-01-31 RX ORDER — DEXTROSE MONOHYDRATE 25 G/50ML
INJECTION, SOLUTION INTRAVENOUS
Status: COMPLETED
Start: 2022-01-31 | End: 2022-01-31

## 2022-01-31 RX ORDER — MINERAL OIL
OIL (ML) MISCELLANEOUS PRN
Status: DISCONTINUED | OUTPATIENT
Start: 2022-01-31 | End: 2022-01-31 | Stop reason: ALTCHOICE

## 2022-01-31 RX ORDER — DOXYCYCLINE HYCLATE 100 MG
100 TABLET ORAL 2 TIMES DAILY
Qty: 28 TABLET | Refills: 0 | Status: SHIPPED | OUTPATIENT
Start: 2022-01-31 | End: 2022-02-14

## 2022-01-31 RX ORDER — LEVOFLOXACIN 750 MG/1
750 TABLET ORAL DAILY
Qty: 7 TABLET | Refills: 0 | Status: SHIPPED | OUTPATIENT
Start: 2022-01-31 | End: 2022-02-07

## 2022-01-31 RX ORDER — OXYCODONE HYDROCHLORIDE 5 MG/1
10 TABLET ORAL ONCE
Status: COMPLETED | OUTPATIENT
Start: 2022-01-31 | End: 2022-01-31

## 2022-01-31 RX ORDER — MIDAZOLAM HYDROCHLORIDE 1 MG/ML
INJECTION INTRAMUSCULAR; INTRAVENOUS PRN
Status: DISCONTINUED | OUTPATIENT
Start: 2022-01-31 | End: 2022-01-31 | Stop reason: SDUPTHER

## 2022-01-31 RX ORDER — SODIUM CHLORIDE 9 MG/ML
INJECTION, SOLUTION INTRAVENOUS CONTINUOUS PRN
Status: DISCONTINUED | OUTPATIENT
Start: 2022-01-31 | End: 2022-01-31 | Stop reason: SDUPTHER

## 2022-01-31 RX ORDER — DEXTROSE MONOHYDRATE 25 G/50ML
12.5 INJECTION, SOLUTION INTRAVENOUS ONCE
Status: COMPLETED | OUTPATIENT
Start: 2022-01-31 | End: 2022-01-31

## 2022-01-31 RX ORDER — PROPOFOL 10 MG/ML
INJECTION, EMULSION INTRAVENOUS CONTINUOUS PRN
Status: DISCONTINUED | OUTPATIENT
Start: 2022-01-31 | End: 2022-01-31 | Stop reason: SDUPTHER

## 2022-01-31 RX ORDER — ASPIRIN 325 MG
325 TABLET, DELAYED RELEASE (ENTERIC COATED) ORAL 2 TIMES DAILY
Qty: 60 TABLET | Refills: 11 | Status: SHIPPED | OUTPATIENT
Start: 2022-01-31 | End: 2022-02-28

## 2022-01-31 RX ORDER — DEXAMETHASONE SODIUM PHOSPHATE 4 MG/ML
INJECTION, SOLUTION INTRA-ARTICULAR; INTRALESIONAL; INTRAMUSCULAR; INTRAVENOUS; SOFT TISSUE PRN
Status: DISCONTINUED | OUTPATIENT
Start: 2022-01-31 | End: 2022-01-31 | Stop reason: SDUPTHER

## 2022-01-31 RX ORDER — ONDANSETRON 2 MG/ML
INJECTION INTRAMUSCULAR; INTRAVENOUS PRN
Status: DISCONTINUED | OUTPATIENT
Start: 2022-01-31 | End: 2022-01-31 | Stop reason: SDUPTHER

## 2022-01-31 RX ADMIN — Medication 2000 MG: at 11:44

## 2022-01-31 RX ADMIN — MIDAZOLAM 2 MG: 1 INJECTION INTRAMUSCULAR; INTRAVENOUS at 11:33

## 2022-01-31 RX ADMIN — DEXTROSE MONOHYDRATE 12.5 G: 25 INJECTION, SOLUTION INTRAVENOUS at 11:24

## 2022-01-31 RX ADMIN — HYDROMORPHONE HYDROCHLORIDE 0.5 MG: 2 INJECTION, SOLUTION INTRAMUSCULAR; INTRAVENOUS; SUBCUTANEOUS at 11:41

## 2022-01-31 RX ADMIN — HYDROMORPHONE HYDROCHLORIDE 1 MG: 2 INJECTION, SOLUTION INTRAMUSCULAR; INTRAVENOUS; SUBCUTANEOUS at 11:33

## 2022-01-31 RX ADMIN — DEXAMETHASONE SODIUM PHOSPHATE 4 MG: 4 INJECTION INTRA-ARTICULAR; INTRALESIONAL; INTRAMUSCULAR; INTRAVENOUS; SOFT TISSUE at 11:50

## 2022-01-31 RX ADMIN — ONDANSETRON 4 MG: 2 INJECTION INTRAMUSCULAR; INTRAVENOUS at 12:05

## 2022-01-31 RX ADMIN — SODIUM CHLORIDE: 9 INJECTION, SOLUTION INTRAVENOUS at 11:33

## 2022-01-31 RX ADMIN — PROPOFOL 50 MG: 10 INJECTION, EMULSION INTRAVENOUS at 11:42

## 2022-01-31 RX ADMIN — PROPOFOL 150 MCG/KG/MIN: 10 INJECTION, EMULSION INTRAVENOUS at 11:41

## 2022-01-31 RX ADMIN — OXYCODONE HYDROCHLORIDE 10 MG: 5 TABLET ORAL at 13:23

## 2022-01-31 RX ADMIN — MIDAZOLAM 2 MG: 1 INJECTION INTRAMUSCULAR; INTRAVENOUS at 11:38

## 2022-01-31 ASSESSMENT — PULMONARY FUNCTION TESTS
PIF_VALUE: 0
PIF_VALUE: 1
PIF_VALUE: 0
PIF_VALUE: 0
PIF_VALUE: 1
PIF_VALUE: 0
PIF_VALUE: 1
PIF_VALUE: 1
PIF_VALUE: 0
PIF_VALUE: 1
PIF_VALUE: 0

## 2022-01-31 ASSESSMENT — LIFESTYLE VARIABLES: SMOKING_STATUS: 0

## 2022-01-31 ASSESSMENT — PAIN - FUNCTIONAL ASSESSMENT: PAIN_FUNCTIONAL_ASSESSMENT: 0-10

## 2022-01-31 ASSESSMENT — PAIN SCALES - GENERAL
PAINLEVEL_OUTOF10: 5

## 2022-01-31 ASSESSMENT — PAIN DESCRIPTION - DESCRIPTORS: DESCRIPTORS: ACHING

## 2022-01-31 ASSESSMENT — ENCOUNTER SYMPTOMS: SHORTNESS OF BREATH: 0

## 2022-01-31 NOTE — ANESTHESIA POSTPROCEDURE EVALUATION
Department of Anesthesiology  Postprocedure Note    Patient: Beth Cho  MRN: [id number]  YOB: 1980  Date of evaluation: 1/31/2022  Time:  12:59 PM     Procedure Summary     Date: 01/31/22 Room / Location: [facility] OR 07 / [facility]    Anesthesia Start:  Anesthesia Stop:     Procedure: SPLIT THICKNESS SKIN GRAFT FROM LEFT THIGH TO LEFT ANKLE ++KEEP TIME AT 12:29++ LATEX (Left ) Diagnosis: (LEFT ANKLE WOUND)    Surgeons: Misael Richards DPM Responsible Provider:     Anesthesia Type: MAC ASA Status: 3          Anesthesia Type: MAC    Michaela Phase I: Michaela Score: 10    Michaela Phase II: Michaela Score: 10    Last vitals: Reviewed and per EMR flowsheets.        Anesthesia Post Evaluation    Patient location during evaluation: PACU  Patient participation: complete - patient participated  Level of consciousness: awake  Pain score: 3  Airway patency: patent  Nausea & Vomiting: no nausea and no vomiting  Complications: no  Cardiovascular status: blood pressure returned to baseline  Respiratory status: acceptable  Hydration status: euvolemic

## 2022-01-31 NOTE — ANESTHESIA PRE PROCEDURE
Department of Anesthesiology  Preprocedure Note       Name:  Marleny Lewis   Age:  42 y.o.  :  1980                                          MRN:  [id number]         Date:  2022      Surgeon: Gab Jerome):  Kylee Regalado DPM    Procedure: Procedure(s):  SPLIT THICKNESS SKIN GRAFT FROM LEFT THIGH TO LEFT ANKLE ++KEEP TIME AT 12:29++ LATEX    Medications prior to admission:   Prior to Admission medications    Medication Sig Start Date End Date Taking? Authorizing Provider   acetaminophen (TYLENOL) 325 MG tablet Take 650 mg by mouth every 4 hours as needed for Pain   Yes Historical Provider, MD   oxyCODONE HCl (OXY-IR) 10 MG immediate release tablet Take 10 mg by mouth daily. Yes Historical Provider, MD   topiramate (TOPAMAX) 25 MG tablet Take 25 mg by mouth 2 times daily   Yes Historical Provider, MD   polyethylene glycol (MIRALAX) 17 g PACK packet Take 17 g by mouth 2 times daily   Yes Historical Provider, MD   lactulose (CEPHULAC) 10 g packet Take 10 g by mouth daily   Yes Historical Provider, MD   sucralfate (CARAFATE) 1 GM/10ML suspension Take 1 g by mouth 4 times daily As needed   Yes Historical Provider, MD   lamoTRIgine (LAMICTAL) 100 MG tablet take 1 tablet by mouth at bedtime 21  Yes Historical Provider, MD   oxyCODONE ER (XTAMPZA ER) 13.5 MG C12A Take by mouth 2 times daily. Yes Historical Provider, MD   lidocaine (HM LIDOCAINE PATCH) 4 % external patch Place 1 patch onto the skin daily   Yes Historical Provider, MD   tiZANidine (ZANAFLEX) 2 MG tablet Take 2 mg by mouth 3 times daily   Yes Historical Provider, MD   acarbose (PRECOSE) 100 MG tablet Take 100 mg by mouth 4 times daily  19  Yes Historical Provider, MD   albuterol sulfate HFA (PROVENTIL HFA) 108 (90 Base) MCG/ACT inhaler Inhale 2 puffs into the lungs every 6 hours as needed  18  Yes Historical Provider, MD   ALPRAZolam (XANAX) 0.5 MG tablet Take 0.5 mg by mouth 4 times daily as needed.   1/10/19  Yes Historical Provider, MD   benztropine (COGENTIN) 2 MG tablet Take 2 mg by mouth 2 times daily  4/27/19  Yes Historical Provider, MD   cloZAPine (CLOZARIL) 100 MG tablet Take 300 mg by mouth nightly  12/11/18  Yes Historical Provider, MD SUBRAMANIAN VITAMIN B-12 TR 1000 MCG TBCR Take 1 tablet by mouth daily  2/28/19  Yes Historical Provider, MD   EPINEPHrine (Veatrice Radar 2-MARIBEL) 0.15 MG/0.3ML SOAJ Inject into the skin once as needed  3/12/08  Yes Historical Provider, MD   vitamin D (ERGOCALCIFEROL) 94500 units CAPS capsule Take 50,000 Units by mouth every 14 days  5/1/19  Yes Historical Provider, MD   estradiol (VIVELLE) 0.1 MG/24HR Place 1 patch onto the skin Twice a Week Indications: Sun and Wed  5/1/19  Yes Historical Provider, MD   famotidine (PEPCID) 20 MG tablet Take 20 mg by mouth 2 times daily  4/27/19  Yes Historical Provider, MD   levothyroxine (SYNTHROID) 100 MCG tablet Take 100 mcg by mouth Daily  12/12/18  Yes Historical Provider, MD   sertraline (ZOLOFT) 100 MG tablet Take 150 mg by mouth daily  5/7/19  Yes Historical Provider, MD       Current medications:    Current Facility-Administered Medications   Medication Dose Route Frequency Provider Last Rate Last Admin    ceFAZolin (ANCEF) 2000 mg in sterile water 20 mL IV syringe  2,000 mg IntraVENous Rufus Preston MD           Allergies:     Allergies   Allergen Reactions    Latex Rash     second degree burn    Fish Allergy Anaphylaxis    Adhesive Tape Other (See Comments)     Redness, blisters    Fentanyl Other (See Comments)     Fentanyl patch caused panic attack    Hydrocodone-Acetaminophen      GI BLEED    Iodine     Ketorolac Tromethamine Nausea Only    Medfix Ez [Wound Dressings]     Pregabalin Other (See Comments)    Tramadol Nausea Only    Buspirone Rash    Clindamycin Rash    Gabapentin Rash    Metronidazole Rash    Morphine Rash     IV \"a red line started going up towards my heart\"       Problem List:    Patient Active Problem List   Diagnosis Code  Fracture dislocation of toe joint, left, closed, initial encounter S92.912A    Dysplastic Nevus of back D48.5    Ankle arthritis M19.079    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    Status post osteotomy Z98.890    Mild intermittent asthma J45.20    Acquired hypothyroidism E03.9       Past Medical History:        Diagnosis Date    Agoraphobia     Arthritis     Asthma     well controlled 22    Atypical nevi     SEV ATN L flank , Mod to Sev ATN L mid back 2019    Bipolar 1 disorder ([address]     Borderline personality disorder ([address]     Claustrophobia     Diabetes mellitus ([address]     Difficulty walking     lack of coordination    Dissociative disorder     Fibromyalgia     Foot pain, right     Generalized anxiety disorder     H/O migraine     Hidradenitis suppurativa     usually axillas    Hypoglycemia     IBS (irritable bowel syndrome)     Muscle weakness     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     SCHEDULED   FOR THE SURGERY ON 21    Osteopenia     Other manic episodes ([address]     Paranoid schizophrenia ([address]     Peripheral autonomic neuropathy     PONV (postoperative nausea and vomiting)     Seizure ([address]     if sugar drops    Severe depression ([address]     Thyroid disease        Past Surgical History:        Procedure Laterality Date    ANKLE SURGERY Left 2009    CARPAL TUNNEL RELEASE Left      SECTION  2004    ENDOMETRIAL ABLATION  2009    Novasure    FOOT FRACTURE SURGERY Left 2019    REMOVAL PAINFUL FIXATION THIRD TOE LEFT FOOT performed by Ami Bonilla DPM at [facility]       x3    [address] Left 2021    LEFT CALCANEAL OSTEOTOMY , REMOVAL HARDWARE AND REPAIR OF MALLEOLAR FRACTURE performed by Misael Richards DPM at [facility]  2005    left abdomen    HYSTERECTOMY  2009    KNEE ARTHROSCOPY Left 2012    KNEE ARTHROSCOPY Right     x 2 in    [address] [place]     OTHER SURGICAL HISTORY  2009    staples removed from hernia    OTHER SURGICAL HISTORY Left 2020    dr escalera- excision dysplactis lesionx2 left flank and back    OVARY REMOVAL  2006    ROTATOR CUFF REPAIR Right 1997    ROTATOR CUFF REPAIR Right 2012    revision    SHOULDER ARTHROSCOPY Right     TEMPOROMANDIBULAR JOINT SURGERY Left 2008    TEMPOROMANDIBULAR JOINT SURGERY Bilateral 1999    TOTAL ANKLE ARTHROPLASTY Left 2021    LEFT ANKLE FUSION TAKEDOWN LEFT TOTAL ANKLE REPLACEMENT performed by Leroy Epstein DPM at [address] [place] [address]  2006     battery, removed     [facility]       Social History:    Social History     Tobacco Use    Smoking status: Former Smoker     Packs/day: 1.00     Years: 28.00     Pack years: 28.00     Types: Cigarettes     Start date:      Quit date: 2020     Years since quittin.4    Smokeless tobacco: Never Used    Tobacco comment: smoked on and off for the 28 years cigarettes and cigars   Substance Use Topics    Alcohol use: Not Currently     Comment: sober since                                 Counseling given: Not Answered  Comment: smoked on and off for the 28 years cigarettes and cigars      Vital Signs (Current):   Vitals:    22 1532 22 1053   BP:  (!) 97/55   Pulse:  74   Resp:  14   Temp:  97.5 °F (36.4 °C)   TempSrc:  Temporal   SpO2:  100%   Weight: 223 lb (101.2 kg) 223 lb (101.2 kg)   Height: 5' 6\" (1.676 m) 5' 6\" (1.676 m)                                              BP Readings from Last 3 Encounters:   22 (!) 97/55   21 113/66   21 (!) 149/72       NPO Status:                                                                                 BMI:   Wt Readings from Last 3 Encounters:   22 223 lb (101.2 kg)   21 224 lb (101.6 kg)   21 224 lb (101.6 kg)     Body mass index is 35.99 kg/m².    CBC:   Lab Results   Component Value Date    WBC 7.1 01/18/2022    RBC 4.03 01/18/2022    RBC 4.67 08/06/2021    HGB 11.3 01/18/2022    HCT 36.7 01/18/2022    MCV 91.1 01/18/2022    RDW 14.2 01/18/2022     01/18/2022       CMP:   Lab Results   Component Value Date     01/18/2022    K 4.9 01/18/2022    K 3.6 12/07/2021     01/18/2022    CO2 24 01/18/2022    BUN 6 01/18/2022    CREATININE 0.7 01/18/2022    GFRAA >60 01/18/2022    LABGLOM >60 01/18/2022    GLUCOSE 88 01/18/2022    PROT 6.6 01/18/2022    CALCIUM 9.5 01/18/2022    BILITOT <0.2 01/18/2022    ALKPHOS 109 01/18/2022    AST 10 01/18/2022    ALT 7 01/18/2022       POC Tests: No results for input(s): POCGLU, POCNA, POCK, POCCL, POCBUN, POCHEMO, POCHCT in the last 72 hours. Coags: No results found for: PROTIME, INR, APTT    HCG (If Applicable): No results found for: PREGTESTUR, PREGSERUM, HCG, HCGQUANT     ABGs: No results found for: PHART, PO2ART, XVV5OOK, QRZ0JHJ, BEART, G3TCHTTG     Type & Screen (If Applicable):  No results found for: LABABO, LABRH    Drug/Infectious Status (If Applicable):  No results found for: HIV, HEPCAB    COVID-19 Screening (If Applicable):   Lab Results   Component Value Date    COVID19 Not Detected 12/08/2021       EKG 8/6/21  SINUS RHYTHM  Compared to ECG 07/10/2005 13:14:45  Inferior Q waves no longer present  Q waves no longer present    Anesthesia Evaluation  Patient summary reviewed and Nursing notes reviewed   history of anesthetic complications: PONV.   Airway: Mallampati: III  TM distance: >3 FB   Neck ROM: full  Mouth opening: > = 3 FB Dental:      Comment: Upper and lower crowns    Pulmonary: breath sounds clear to auscultation  (+) pneumonia: resolved,  asthma: seasonal asthma,     (-) shortness of breath (last used a few days ago per pt) and not a current smoker                           Cardiovascular:  Exercise tolerance: poor (<4 METS), D/t asthma per pt        ECG reviewed  Rhythm: regular  Rate: normal           Beta Blocker:  Not on Beta Blocker         Neuro/Psych:   (+) seizures (with blood sugar drop): well controlled, neuromuscular disease:, headaches: migraine headaches, psychiatric history (Bipolar 1 disorder):depression/anxiety              ROS comment: Fibromyalgia  Dissociative disorder  Last seizure 7 yrs ago. GI/Hepatic/Renal:   (+) hiatal hernia, GERD: well controlled, liver disease (liver lesions):,          ROS comment: Atypical nevi  Obesity. Endo/Other:    (+) hypothyroidism, blood dyscrasia: anemia, arthritis: OA., malignancy/cancer (breast mass per patient). Abdominal:             Vascular: negative vascular ROS. Other Findings:               Anesthesia Plan      MAC     ASA 3       Induction: intravenous. Anesthetic plan and risks discussed with patient and father. Use of blood products discussed with father whom. Plan discussed with CRNA. Daisy Siu MD   1/31/2022      DOS STAFF ADDENDUM:    Pt seen and examined, physical exam updated, chart reviewed including anesthesia, drug and allergy history. H&P reviewed. No interval changes to history or physical examination (unless noted above). NPO status confirmed. Anesthetic plan, risks, benefits, alternatives discussed with patient. Patient verbalized an understanding and agrees to proceed. Daisy Siu MD  Staff Anesthesiologist  10:54 AM          Patient seen and chart reviewed. No interval change in history or exam.   Anesthesia plan discussed, risk/benefits addressed. Patient's concerns and questions answered.      Daisy Siu MD  January 31, 2022  10:54 AM

## 2022-01-31 NOTE — BRIEF OP NOTE
Brief Postoperative Note      Patient: Rush Martinez  YOB: 1980  MRN: [id number]    Date of Procedure: 1/31/2022    Pre-Op Diagnosis: LEFT ANKLE WOUND    Post-Op Diagnosis: Same       Procedure(s):  SPLIT THICKNESS SKIN GRAFT FROM LEFT THIGH TO LEFT ANKLE,APPLICATION OF WEI WOUND VAC    Surgeon(s):  Davis Hernandez DPM    Assistant:  Resident:  Lord Ana MD; Eric Salinas DPM    Anesthesia: Monitor Anesthesia Care    Estimated Blood Loss (mL): Minimal    Complications: None    Specimens:   ID Type Source Tests Collected by Time Destination   1 : LEFT ANKLE TISSUE CULTURE Tissue Tissue CULTURE, ANAEROBIC, CULTURE, FUNGUS, GRAM STAIN, CULTURE, SURGICAL, CULTURE WITH SMEAR, ACID FAST Alvera Castillo Nguyen DPM 1/31/2022 1158        Implants:  None    Drains:   Negative Pressure Wound Therapy Foot Left (Active)       Findings: Consistent with diagnosis     Electronically signed by Lord Ana MD on 1/31/2022 at 12:23 PM

## 2022-02-01 NOTE — OP NOTE
[address] [address]                                OPERATIVE REPORT    PATIENT NAME: Meenu Ervin                        :        1980  MED REC NO:   [id number]                            ROOM:  ACCOUNT NO:   [de-identified]                           ADMIT DATE: 2022  PROVIDER:     Christiano Hester DPM    DATE OF PROCEDURE:  2022    INDICATION:  The patient is seen for problems regarding a wound on the  anterior aspect of her left ankle. At this time, she is set up for  surgery today at [facility]. E's understanding the pros, cons, risks,  and benefits. With this understanding, she agreed to the consent, site  marking, and perioperative management. She is here with her dad and he  completely understands them. With this understanding, she agreed to the  proposed surgery. LOWER EXTREMITY PHYSICAL EXAMINATION:  VASCULAR:  She has intact pedal pulses 2/4 DP and PT with good capillary  refill time. There is mild diffuse postoperative edema on the left. NEUROLOGIC:  She has sensation that has not changed with intact  sensation to her left lower extremity. DERMATOLOGIC:  She has a wound that measures 3.5 x 2.0 cm, full  thickness in nature. No cardinal signs of infection, drainage,  undermining, or tunneling. MUSCULOSKELETAL:  She has good range of motion of the ankle. It is  pain-free since her total ankle replacement. 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 SURGEON:  Christiano Hester DPM.    ASSISTANTS:  1.  Dr. Sree Mancini, Fellow. 2.  Dr. Noman Serrato, PGY-3.  3.  Dr. Jacques Richard, PGY-2.    PREOPERATIVE DIAGNOSIS:  Open wound, left ankle, that measures 3.5 x 2.0  cm. POSTOPERATIVE DIAGNOSIS:  Open wound, left ankle, that measures 3.5 x  2.0 cm. PROCEDURES PERFORMED:  1.   Full-thickness sharp excisional debridement, left ankle. 2.  Split-thickness skin graft from left thigh to left ankle. 3.  Application of WEI negative pressure therapy device. PROCEDURE IN DETAIL:  The patient was seen in the preoperative holding  area. Appropriate site marking was performed. She agreed to the  consent, site marking, and perioperative management. She was brought  into the OR and placed well padded on the OR table where anesthesia was  achieved. Once the anesthesia was achieved, appropriate timeout was  performed and everybody in the room concurred. Her left foot, leg, and  thigh were prepped and draped in the usual sterile fashion. Procedure Number 1:  FULL-THICKNESS SHARP EXCISIONAL DEBRIDEMENT OF THE  LEFT ANKLE WOUND. The wound measured 3.5 x 2.0. Using rongeur and a 10  blade, a full-thickness sharp excisional debridement was performed  stimulating bleeding. Tissues were sent for Gram stain, aerobic,  anaerobic, fungal, acid fast, and cultures to role out infection  clinically. Procedure Number 2:  SPLIT-THICKNESS SKIN GRAFT, LEFT THIGH TO LEFT  ANKLE. Next, mineral oil was applied to the left thigh. The dermatome  was set at 18 hundredths of an inch and 1 inch blade. Skin was  harvested, put through a 1.5:1 mesher, and applied to the ankle. This  fit nicely to the ankle. Procedure Number 3:  APPLICATION OF NEGATIVE PRESSURE THERAPY WEI  DEVICE. Next, a WEI device was applied. It has a setting 75 mmHg. This was placed with a bolster-type dressing. Next, dry sterile  dressing was applied. A posterior splint was applied. A bolster was  applied with mildly compressive bandage applied on top of the left lower  extremity. She was held at 90 degrees in a neutral position. An estimated less than 5 mL of blood was lost during the surgery. Posterior splint was applied.   She tolerated the procedure and  anesthesia well and left the OR with vital signs stable and  neurovascular status intact.         Zak Simms DPM    D: 01/31/2022 12:34:10       T: 01/31/2022 15:15:07     [email or web address]  Job#: [id number]     Doc#: [id number]

## 2022-02-02 LAB
ANAEROBIC CULTURE: NORMAL
CULTURE SURGICAL: ABNORMAL
ORGANISM: ABNORMAL

## 2022-02-18 RX ORDER — ALPRAZOLAM 0.25 MG/1
TABLET ORAL
COMMUNITY
Start: 2022-01-19 | End: 2022-02-18

## 2022-02-18 RX ORDER — ACARBOSE 25 MG/1
TABLET ORAL
COMMUNITY
Start: 2021-12-09 | End: 2022-02-18

## 2022-02-18 RX ORDER — PROMETHAZINE HYDROCHLORIDE 25 MG/1
25 TABLET ORAL EVERY 6 HOURS PRN
COMMUNITY

## 2022-02-22 LAB
ANION GAP SERPL CALCULATED.3IONS-SCNC: 11 MMOL/L (ref 7–16)
BASOPHILS ABSOLUTE: 0.03 E9/L (ref 0–0.2)
BASOPHILS RELATIVE PERCENT: 0.4 % (ref 0–2)
BUN BLDV-MCNC: 8 MG/DL (ref 6–20)
CALCIUM SERPL-MCNC: 8.7 MG/DL (ref 8.6–10.2)
CHLORIDE BLD-SCNC: 101 MMOL/L (ref 98–107)
CO2: 22 MMOL/L (ref 22–29)
CREAT SERPL-MCNC: 0.7 MG/DL (ref 0.5–1)
EOSINOPHILS ABSOLUTE: 0.08 E9/L (ref 0.05–0.5)
EOSINOPHILS RELATIVE PERCENT: 1 % (ref 0–6)
GFR AFRICAN AMERICAN: >60
GFR NON-AFRICAN AMERICAN: >60 ML/MIN/1.73
GLUCOSE BLD-MCNC: 88 MG/DL (ref 74–99)
HCT VFR BLD CALC: 39.8 % (ref 34–48)
HEMOGLOBIN: 12.1 G/DL (ref 11.5–15.5)
IMMATURE GRANULOCYTES #: 0.11 E9/L
IMMATURE GRANULOCYTES %: 1.4 % (ref 0–5)
LYMPHOCYTES ABSOLUTE: 1.77 E9/L (ref 1.5–4)
LYMPHOCYTES RELATIVE PERCENT: 22 % (ref 20–42)
MCH RBC QN AUTO: 27.8 PG (ref 26–35)
MCHC RBC AUTO-ENTMCNC: 30.4 % (ref 32–34.5)
MCV RBC AUTO: 91.5 FL (ref 80–99.9)
MONOCYTES ABSOLUTE: 0.52 E9/L (ref 0.1–0.95)
MONOCYTES RELATIVE PERCENT: 6.5 % (ref 2–12)
NEUTROPHILS ABSOLUTE: 5.52 E9/L (ref 1.8–7.3)
NEUTROPHILS RELATIVE PERCENT: 68.7 % (ref 43–80)
PDW BLD-RTO: 14.1 FL (ref 11.5–15)
PLATELET # BLD: 362 E9/L (ref 130–450)
PMV BLD AUTO: 10.9 FL (ref 7–12)
POTASSIUM SERPL-SCNC: 4.2 MMOL/L (ref 3.5–5)
RBC # BLD: 4.35 E12/L (ref 3.5–5.5)
SODIUM BLD-SCNC: 134 MMOL/L (ref 132–146)
WBC # BLD: 8 E9/L (ref 4.5–11.5)

## 2022-02-23 LAB — HBA1C MFR BLD: 5.1 % (ref 4–5.6)

## 2022-02-23 NOTE — PROGRESS NOTES
Spoke with Sravanthi Gamboa, nurse at [facility] assisted living where pt resides. Pt information obtained from her and requested her to fax Front sheet, MAR, [facility] [place] documents, diagnosis sheet. Instructed on needed BW for DOS per anesthesia CBC, CMP, HgB A1C if not done within 3 months and EKG with results faxed to us. Val hubbard understanding.

## 2022-02-23 NOTE — PROGRESS NOTES

## 2022-02-24 ENCOUNTER — ANESTHESIA EVENT (OUTPATIENT)
Dept: OPERATING ROOM | Age: 42
End: 2022-02-24
Payer: MEDICARE

## 2022-02-24 LAB — MRSA CULTURE ONLY: NORMAL

## 2022-02-24 NOTE — PROGRESS NOTES
Dr. Leonardo Kohler reviewed pts chart/history/ MAR. Pt may take both oxycontin medications DOS with sip water per her normal routine.

## 2022-02-25 NOTE — PROGRESS NOTES
Spoke with Cristian Ku, Nurse at [facility] where pt resides. All information obtained from Nurse whom states pt is alert, oriented, signs for self. Zoila Sena (father) is POA ,  documents  on chart. Inda Brittle He is also her transportation DOS. . Pt is Covid vaccinated, asymptomatic. . takes meds with water, no difficulty. No tubes/ drains or oxygen in use. Able to use call light independently. Wound from previous surgery left lower leg, walking boot present. Pt is PWB left leg and pivots with assistance.

## 2022-02-25 NOTE — PROGRESS NOTES
[facility] PRE-ADMISSION TESTING INSTRUCTIONS    The Preadmission Testing patient is instructed accordingly using the following criteria (check applicable):    ARRIVAL INSTRUCTIONS:  [x] Parking the day of Surgery is located in the Main Entrance lot. Upon entering the door, make an immediate right to the surgery reception desk    [x] Bring photo ID and insurance card    [] Bring in a copy of Living will or Durable Power of  papers. [x] Please be sure to arrange for responsible adult to provide transportation to and from the hospital    [x] Please arrange for responsible adult to be with you for the 24 hour period post procedure due to having anesthesia      GENERAL INSTRUCTIONS:    [x] Nothing by mouth after midnight, including gum, candy, mints or water    [x] You may brush your teeth, but do not swallow any water    [x] Take medications as instructed with 1-2 oz of water    [x] Stop herbal supplements and vitamins 5 days prior to procedure    [x] Follow preop dosing of blood thinners per physician instructions    [] Take 1/2 dose of evening insulin, but no insulin after midnight    [] No oral diabetic medications after midnight    [x] If diabetic and have low blood sugar or feel symptomatic, take 1-2oz apple juice only    [x] Bring inhalers day of surgery    [] Bring C-PAP/ Bi-Pap day of surgery    [] Bring urine specimen day of surgery    [x] Shower or bath with soap, lather and rinse well, AM of Surgery, no lotion, powders or creams     [] Follow bowel prep as instructed per surgeon    [x] No tobacco products within 24 hours of surgery     [x] No alcohol or illegal drug use within 24 hours of surgery.     [x] Jewelry, body piercing's, eyeglasses, contact lenses and dentures are not permitted into surgery (bring cases)      [x] Please do not wear any nail polish, make up or hair products on the day of surgery    [x] You can expect a call the business day prior to procedure to notify you if your arrival time changes    [x] If you receive a survey after surgery we would greatly appreciate your comments    [] Parent/guardian of a minor must accompany their child and remain on the premises  the entire time they are under our care     [] Pediatric patients may bring favorite toy, blanket or comfort item with them    [x] A caregiver or family member must remain with the patient during their stay if they are mentally handicapped, have dementia, disoriented or unable to use a call light or would be a safety concern if left unattended    [x] Please notify surgeon if you develop any illness between now and time of surgery (cold, cough, sore throat, fever, nausea, vomiting) or any signs of infections  including skin, wounds, and dental.    [x]  The Outpatient Pharmacy is available to fill your prescription here on your day of surgery, ask your preop nurse for details    [x] Other instructions: wear loose, comfortable clothing  EDUCATIONAL MATERIALS PROVIDED:    [] PAT Preoperative Education Packet/Booklet     [] Medication List    [] Transfusion bracelet applied with instructions    [] Shower with soap, lather and rinse well, and use CHG wipes provided the evening before surgery as instructed    [] Incentive spirometer with instructions

## 2022-02-25 NOTE — PROGRESS NOTES
Reviewed avail be EKG from 2/22/22 and old EKG interpretations with Dr Huber Morales, no further preoperative needs identified at this time.

## 2022-02-28 RX ORDER — RIVAROXABAN 10 MG/1
10 TABLET, FILM COATED ORAL
COMMUNITY

## 2022-02-28 NOTE — PROGRESS NOTES
Spoke with Deon Whittington, Nurse at facility for clarification on blood thinners pt currently takes. She states pt has not taken Aspirin for a  While, and Sangita Rao has been on hold since 2/26/2022.

## 2022-02-28 NOTE — PROGRESS NOTES
Have you been tested for COVID  No  vaccinated         Have you been told you were positive for COVID No  Have you had any known exposure to someone that is positive for COVID No  Do you have a cough                   No              Do you have shortness of breath No                 Do you have a sore throat            No                Are you having chills                    No                Are you having muscle aches. No                    Please come to the hospital wearing a mask and have your significant other wear a mask as well. Both of you should check your temperature before leaving to come here,  if it is 100 or higher please call [phone] for instruction.

## 2022-03-04 ENCOUNTER — HOSPITAL ENCOUNTER (OUTPATIENT)
Age: 42
Setting detail: OUTPATIENT SURGERY
Discharge: HOME | End: 2022-03-04
Attending: PODIATRIST | Admitting: PODIATRIST
Payer: MEDICARE

## 2022-03-04 ENCOUNTER — APPOINTMENT (OUTPATIENT)
Dept: GENERAL RADIOLOGY | Age: 42
End: 2022-03-04
Attending: PODIATRIST
Payer: MEDICARE

## 2022-03-04 ENCOUNTER — ANESTHESIA (OUTPATIENT)
Dept: OPERATING ROOM | Age: 42
End: 2022-03-04
Payer: MEDICARE

## 2022-03-04 VITALS
HEART RATE: 83 BPM | DIASTOLIC BLOOD PRESSURE: 94 MMHG | WEIGHT: 229 LBS | SYSTOLIC BLOOD PRESSURE: 147 MMHG | RESPIRATION RATE: 20 BRPM | HEIGHT: 66 IN | OXYGEN SATURATION: 98 % | TEMPERATURE: 49.6 F | BODY MASS INDEX: 36.8 KG/M2

## 2022-03-04 VITALS
DIASTOLIC BLOOD PRESSURE: 69 MMHG | OXYGEN SATURATION: 99 % | TEMPERATURE: 95.5 F | SYSTOLIC BLOOD PRESSURE: 140 MMHG | RESPIRATION RATE: 5 BRPM

## 2022-03-04 DIAGNOSIS — G89.18 POST-OP PAIN: Primary | ICD-10-CM

## 2022-03-04 LAB
METER GLUCOSE: 134 MG/DL (ref 74–99)
METER GLUCOSE: 79 MG/DL (ref 74–99)
METER GLUCOSE: 94 MG/DL (ref 74–99)

## 2022-03-04 PROCEDURE — 82962 GLUCOSE BLOOD TEST: CPT

## 2022-03-04 PROCEDURE — 88300 SURGICAL PATH GROSS: CPT

## 2022-03-04 PROCEDURE — 7100000001 HC PACU RECOVERY - ADDTL 15 MIN: Performed by: PODIATRIST

## 2022-03-04 PROCEDURE — 3600000012 HC SURGERY LEVEL 2 ADDTL 15MIN: Performed by: PODIATRIST

## 2022-03-04 PROCEDURE — 7100000010 HC PHASE II RECOVERY - FIRST 15 MIN: Performed by: PODIATRIST

## 2022-03-04 PROCEDURE — 88311 DECALCIFY TISSUE: CPT

## 2022-03-04 PROCEDURE — 6360000002 HC RX W HCPCS

## 2022-03-04 PROCEDURE — 6360000002 HC RX W HCPCS: Performed by: ANESTHESIOLOGY

## 2022-03-04 PROCEDURE — 2500000003 HC RX 250 WO HCPCS

## 2022-03-04 PROCEDURE — 3700000001 HC ADD 15 MINUTES (ANESTHESIA): Performed by: PODIATRIST

## 2022-03-04 PROCEDURE — 2500000003 HC RX 250 WO HCPCS: Performed by: PODIATRIST

## 2022-03-04 PROCEDURE — 3209999900 FLUORO FOR SURGICAL PROCEDURES

## 2022-03-04 PROCEDURE — 2720000000 HC MISC SURG SUPPLY STERILE $0-50: Performed by: PODIATRIST

## 2022-03-04 PROCEDURE — 3700000000 HC ANESTHESIA ATTENDED CARE: Performed by: PODIATRIST

## 2022-03-04 PROCEDURE — 6370000000 HC RX 637 (ALT 250 FOR IP)

## 2022-03-04 PROCEDURE — 3600000002 HC SURGERY LEVEL 2 BASE: Performed by: PODIATRIST

## 2022-03-04 PROCEDURE — 6370000000 HC RX 637 (ALT 250 FOR IP): Performed by: ANESTHESIOLOGY

## 2022-03-04 PROCEDURE — 2580000003 HC RX 258

## 2022-03-04 PROCEDURE — C1713 ANCHOR/SCREW BN/BN,TIS/BN: HCPCS | Performed by: PODIATRIST

## 2022-03-04 PROCEDURE — 88304 TISSUE EXAM BY PATHOLOGIST: CPT

## 2022-03-04 PROCEDURE — 2709999900 HC NON-CHARGEABLE SUPPLY: Performed by: PODIATRIST

## 2022-03-04 PROCEDURE — 7100000011 HC PHASE II RECOVERY - ADDTL 15 MIN: Performed by: PODIATRIST

## 2022-03-04 PROCEDURE — 7100000000 HC PACU RECOVERY - FIRST 15 MIN: Performed by: PODIATRIST

## 2022-03-04 PROCEDURE — 2720000002 HC MISC SURG SUPPLY STERILE >$500: Performed by: PODIATRIST

## 2022-03-04 DEVICE — GRAFT BNE SUB 15ML 1.7-10MM CANC CHIP MORSELIZED FRZ DRY: Type: IMPLANTABLE DEVICE | Site: FOOT | Status: FUNCTIONAL

## 2022-03-04 RX ORDER — DOXYCYCLINE HYCLATE 100 MG
100 TABLET ORAL 2 TIMES DAILY
Qty: 14 TABLET | Refills: 0 | Status: SHIPPED | OUTPATIENT
Start: 2022-03-04 | End: 2022-03-11

## 2022-03-04 RX ORDER — ONDANSETRON 2 MG/ML
INJECTION INTRAMUSCULAR; INTRAVENOUS PRN
Status: DISCONTINUED | OUTPATIENT
Start: 2022-03-04 | End: 2022-03-04 | Stop reason: SDUPTHER

## 2022-03-04 RX ORDER — ACETAMINOPHEN 500 MG
TABLET ORAL
Status: COMPLETED
Start: 2022-03-04 | End: 2022-03-04

## 2022-03-04 RX ORDER — LIDOCAINE HYDROCHLORIDE 20 MG/ML
INJECTION, SOLUTION EPIDURAL; INFILTRATION; INTRACAUDAL; PERINEURAL PRN
Status: DISCONTINUED | OUTPATIENT
Start: 2022-03-04 | End: 2022-03-04 | Stop reason: SDUPTHER

## 2022-03-04 RX ORDER — KETAMINE HYDROCHLORIDE 10 MG/ML
INJECTION, SOLUTION INTRAMUSCULAR; INTRAVENOUS PRN
Status: DISCONTINUED | OUTPATIENT
Start: 2022-03-04 | End: 2022-03-04 | Stop reason: SDUPTHER

## 2022-03-04 RX ORDER — PROPOFOL 10 MG/ML
INJECTION, EMULSION INTRAVENOUS PRN
Status: DISCONTINUED | OUTPATIENT
Start: 2022-03-04 | End: 2022-03-04 | Stop reason: SDUPTHER

## 2022-03-04 RX ORDER — TRAMADOL HYDROCHLORIDE 50 MG/1
50 TABLET ORAL EVERY 6 HOURS PRN
Qty: 28 TABLET | Refills: 0 | Status: SHIPPED | OUTPATIENT
Start: 2022-03-04 | End: 2022-03-04 | Stop reason: HOSPADM

## 2022-03-04 RX ORDER — MEPERIDINE HYDROCHLORIDE 25 MG/ML
12.5 INJECTION INTRAMUSCULAR; INTRAVENOUS; SUBCUTANEOUS EVERY 5 MIN PRN
Status: DISCONTINUED | OUTPATIENT
Start: 2022-03-04 | End: 2022-03-04 | Stop reason: HOSPADM

## 2022-03-04 RX ORDER — SODIUM CHLORIDE 9 MG/ML
INJECTION, SOLUTION INTRAVENOUS CONTINUOUS PRN
Status: DISCONTINUED | OUTPATIENT
Start: 2022-03-04 | End: 2022-03-04 | Stop reason: SDUPTHER

## 2022-03-04 RX ORDER — DEXAMETHASONE SODIUM PHOSPHATE 4 MG/ML
INJECTION, SOLUTION INTRA-ARTICULAR; INTRALESIONAL; INTRAMUSCULAR; INTRAVENOUS; SOFT TISSUE PRN
Status: DISCONTINUED | OUTPATIENT
Start: 2022-03-04 | End: 2022-03-04 | Stop reason: SDUPTHER

## 2022-03-04 RX ORDER — METOCLOPRAMIDE HYDROCHLORIDE 5 MG/ML
INJECTION INTRAMUSCULAR; INTRAVENOUS
Status: DISCONTINUED
Start: 2022-03-04 | End: 2022-03-04 | Stop reason: HOSPADM

## 2022-03-04 RX ORDER — RIVAROXABAN 10 MG/1
10 TABLET, FILM COATED ORAL
Qty: 30 TABLET | Refills: 1 | Status: SHIPPED | OUTPATIENT
Start: 2022-03-04

## 2022-03-04 RX ORDER — MIDAZOLAM HYDROCHLORIDE 1 MG/ML
INJECTION INTRAMUSCULAR; INTRAVENOUS PRN
Status: DISCONTINUED | OUTPATIENT
Start: 2022-03-04 | End: 2022-03-04 | Stop reason: SDUPTHER

## 2022-03-04 RX ORDER — SODIUM CHLORIDE 9 MG/ML
25 INJECTION, SOLUTION INTRAVENOUS PRN
Status: DISCONTINUED | OUTPATIENT
Start: 2022-03-04 | End: 2022-03-04 | Stop reason: HOSPADM

## 2022-03-04 RX ORDER — PROCHLORPERAZINE EDISYLATE 5 MG/ML
5 INJECTION INTRAMUSCULAR; INTRAVENOUS
Status: DISCONTINUED | OUTPATIENT
Start: 2022-03-04 | End: 2022-03-04 | Stop reason: HOSPADM

## 2022-03-04 RX ORDER — GLYCOPYRROLATE 1 MG/5 ML
SYRINGE (ML) INTRAVENOUS PRN
Status: DISCONTINUED | OUTPATIENT
Start: 2022-03-04 | End: 2022-03-04 | Stop reason: SDUPTHER

## 2022-03-04 RX ORDER — SODIUM CHLORIDE, SODIUM LACTATE, POTASSIUM CHLORIDE, CALCIUM CHLORIDE 600; 310; 30; 20 MG/100ML; MG/100ML; MG/100ML; MG/100ML
INJECTION, SOLUTION INTRAVENOUS CONTINUOUS PRN
Status: DISCONTINUED | OUTPATIENT
Start: 2022-03-04 | End: 2022-03-04 | Stop reason: SDUPTHER

## 2022-03-04 RX ORDER — FENTANYL CITRATE 50 UG/ML
25 INJECTION, SOLUTION INTRAMUSCULAR; INTRAVENOUS EVERY 5 MIN PRN
Status: DISCONTINUED | OUTPATIENT
Start: 2022-03-04 | End: 2022-03-04 | Stop reason: HOSPADM

## 2022-03-04 RX ORDER — ACETAMINOPHEN 500 MG
1000 TABLET ORAL ONCE
Status: COMPLETED | OUTPATIENT
Start: 2022-03-04 | End: 2022-03-04

## 2022-03-04 RX ORDER — BUPIVACAINE HYDROCHLORIDE 5 MG/ML
INJECTION, SOLUTION EPIDURAL; INTRACAUDAL PRN
Status: DISCONTINUED | OUTPATIENT
Start: 2022-03-04 | End: 2022-03-04 | Stop reason: ALTCHOICE

## 2022-03-04 RX ORDER — DIPHENHYDRAMINE HYDROCHLORIDE 50 MG/ML
12.5 INJECTION INTRAMUSCULAR; INTRAVENOUS
Status: DISCONTINUED | OUTPATIENT
Start: 2022-03-04 | End: 2022-03-04 | Stop reason: HOSPADM

## 2022-03-04 RX ORDER — HYDROMORPHONE HCL 110MG/55ML
PATIENT CONTROLLED ANALGESIA SYRINGE INTRAVENOUS PRN
Status: DISCONTINUED | OUTPATIENT
Start: 2022-03-04 | End: 2022-03-04 | Stop reason: SDUPTHER

## 2022-03-04 RX ORDER — SODIUM CHLORIDE 0.9 % (FLUSH) 0.9 %
5-40 SYRINGE (ML) INJECTION EVERY 12 HOURS SCHEDULED
Status: DISCONTINUED | OUTPATIENT
Start: 2022-03-04 | End: 2022-03-04 | Stop reason: HOSPADM

## 2022-03-04 RX ORDER — FENTANYL CITRATE 50 UG/ML
INJECTION, SOLUTION INTRAMUSCULAR; INTRAVENOUS PRN
Status: DISCONTINUED | OUTPATIENT
Start: 2022-03-04 | End: 2022-03-04 | Stop reason: SDUPTHER

## 2022-03-04 RX ORDER — OXYCODONE HYDROCHLORIDE 5 MG/1
10 TABLET ORAL ONCE
Status: COMPLETED | OUTPATIENT
Start: 2022-03-04 | End: 2022-03-04

## 2022-03-04 RX ORDER — METOCLOPRAMIDE HYDROCHLORIDE 5 MG/ML
10 INJECTION INTRAMUSCULAR; INTRAVENOUS ONCE
Status: COMPLETED | OUTPATIENT
Start: 2022-03-04 | End: 2022-03-04

## 2022-03-04 RX ORDER — ROCURONIUM BROMIDE 10 MG/ML
INJECTION, SOLUTION INTRAVENOUS PRN
Status: DISCONTINUED | OUTPATIENT
Start: 2022-03-04 | End: 2022-03-04 | Stop reason: SDUPTHER

## 2022-03-04 RX ORDER — SODIUM CHLORIDE 0.9 % (FLUSH) 0.9 %
5-40 SYRINGE (ML) INJECTION PRN
Status: DISCONTINUED | OUTPATIENT
Start: 2022-03-04 | End: 2022-03-04 | Stop reason: HOSPADM

## 2022-03-04 RX ADMIN — ROCURONIUM BROMIDE 50 MG: 10 INJECTION, SOLUTION INTRAVENOUS at 07:10

## 2022-03-04 RX ADMIN — MIDAZOLAM 2 MG: 1 INJECTION INTRAMUSCULAR; INTRAVENOUS at 06:55

## 2022-03-04 RX ADMIN — FENTANYL CITRATE 50 MCG: 50 INJECTION, SOLUTION INTRAMUSCULAR; INTRAVENOUS at 07:56

## 2022-03-04 RX ADMIN — FAMOTIDINE 20 MG: 10 INJECTION, SOLUTION INTRAVENOUS at 06:55

## 2022-03-04 RX ADMIN — PROPOFOL 140 MG: 10 INJECTION, EMULSION INTRAVENOUS at 07:10

## 2022-03-04 RX ADMIN — SODIUM CHLORIDE: 9 INJECTION, SOLUTION INTRAVENOUS at 06:55

## 2022-03-04 RX ADMIN — MIDAZOLAM 1 MG: 1 INJECTION INTRAMUSCULAR; INTRAVENOUS at 07:14

## 2022-03-04 RX ADMIN — Medication 0.2 MG: at 06:55

## 2022-03-04 RX ADMIN — METOCLOPRAMIDE HYDROCHLORIDE 10 MG: 5 INJECTION INTRAMUSCULAR; INTRAVENOUS at 06:55

## 2022-03-04 RX ADMIN — ACETAMINOPHEN 1000 MG: 500 TABLET ORAL at 06:56

## 2022-03-04 RX ADMIN — OXYCODONE 10 MG: 5 TABLET ORAL at 11:19

## 2022-03-04 RX ADMIN — HYDROMORPHONE HYDROCHLORIDE 0.5 MG: 1 INJECTION, SOLUTION INTRAMUSCULAR; INTRAVENOUS; SUBCUTANEOUS at 10:23

## 2022-03-04 RX ADMIN — KETAMINE HYDROCHLORIDE 20 MG: 10 INJECTION INTRAMUSCULAR; INTRAVENOUS at 07:10

## 2022-03-04 RX ADMIN — KETAMINE HYDROCHLORIDE 10 MG: 10 INJECTION INTRAMUSCULAR; INTRAVENOUS at 07:56

## 2022-03-04 RX ADMIN — Medication 1000 MG: at 06:56

## 2022-03-04 RX ADMIN — HYDROMORPHONE HYDROCHLORIDE 0.5 MG: 2 INJECTION, SOLUTION INTRAMUSCULAR; INTRAVENOUS; SUBCUTANEOUS at 08:43

## 2022-03-04 RX ADMIN — KETAMINE HYDROCHLORIDE 10 MG: 10 INJECTION INTRAMUSCULAR; INTRAVENOUS at 08:57

## 2022-03-04 RX ADMIN — FENTANYL CITRATE 50 MCG: 50 INJECTION, SOLUTION INTRAMUSCULAR; INTRAVENOUS at 07:50

## 2022-03-04 RX ADMIN — HYDROMORPHONE HYDROCHLORIDE 0.5 MG: 2 INJECTION, SOLUTION INTRAMUSCULAR; INTRAVENOUS; SUBCUTANEOUS at 08:52

## 2022-03-04 RX ADMIN — LIDOCAINE HYDROCHLORIDE 100 MG: 20 INJECTION, SOLUTION EPIDURAL; INFILTRATION; INTRACAUDAL; PERINEURAL at 07:10

## 2022-03-04 RX ADMIN — DEXAMETHASONE SODIUM PHOSPHATE 10 MG: 4 INJECTION, SOLUTION INTRAMUSCULAR; INTRAVENOUS at 06:58

## 2022-03-04 RX ADMIN — SODIUM CHLORIDE, POTASSIUM CHLORIDE, SODIUM LACTATE AND CALCIUM CHLORIDE: 600; 310; 30; 20 INJECTION, SOLUTION INTRAVENOUS at 08:58

## 2022-03-04 RX ADMIN — Medication 2000 MG: at 07:09

## 2022-03-04 RX ADMIN — SUGAMMADEX 200 MG: 100 INJECTION, SOLUTION INTRAVENOUS at 09:05

## 2022-03-04 RX ADMIN — HYDROMORPHONE HYDROCHLORIDE 1 MG: 2 INJECTION, SOLUTION INTRAMUSCULAR; INTRAVENOUS; SUBCUTANEOUS at 09:24

## 2022-03-04 RX ADMIN — ONDANSETRON 4 MG: 2 INJECTION INTRAMUSCULAR; INTRAVENOUS at 06:55

## 2022-03-04 RX ADMIN — FENTANYL CITRATE 100 MCG: 50 INJECTION, SOLUTION INTRAMUSCULAR; INTRAVENOUS at 07:10

## 2022-03-04 RX ADMIN — MIDAZOLAM 1 MG: 1 INJECTION INTRAMUSCULAR; INTRAVENOUS at 08:57

## 2022-03-04 ASSESSMENT — PAIN DESCRIPTION - ORIENTATION
ORIENTATION: LEFT

## 2022-03-04 ASSESSMENT — PULMONARY FUNCTION TESTS
PIF_VALUE: 23
PIF_VALUE: 24
PIF_VALUE: 12
PIF_VALUE: 24
PIF_VALUE: 23
PIF_VALUE: 24
PIF_VALUE: 12
PIF_VALUE: 24
PIF_VALUE: 12
PIF_VALUE: 12
PIF_VALUE: 20
PIF_VALUE: 5
PIF_VALUE: 24
PIF_VALUE: 4
PIF_VALUE: 24
PIF_VALUE: 23
PIF_VALUE: 23
PIF_VALUE: 24
PIF_VALUE: 23
PIF_VALUE: 24
PIF_VALUE: 23
PIF_VALUE: 1
PIF_VALUE: 24
PIF_VALUE: 4
PIF_VALUE: 24
PIF_VALUE: 24
PIF_VALUE: 23
PIF_VALUE: 25
PIF_VALUE: 24
PIF_VALUE: 20
PIF_VALUE: 23
PIF_VALUE: 24
PIF_VALUE: 0
PIF_VALUE: 12
PIF_VALUE: 24
PIF_VALUE: 20
PIF_VALUE: 24
PIF_VALUE: 1
PIF_VALUE: 24
PIF_VALUE: 20
PIF_VALUE: 24
PIF_VALUE: 23
PIF_VALUE: 24
PIF_VALUE: 20
PIF_VALUE: 24
PIF_VALUE: 13
PIF_VALUE: 24
PIF_VALUE: 24
PIF_VALUE: 21
PIF_VALUE: 24
PIF_VALUE: 23
PIF_VALUE: 12
PIF_VALUE: 0
PIF_VALUE: 20
PIF_VALUE: 24
PIF_VALUE: 12
PIF_VALUE: 24
PIF_VALUE: 0
PIF_VALUE: 0
PIF_VALUE: 24
PIF_VALUE: 4
PIF_VALUE: 23
PIF_VALUE: 0
PIF_VALUE: 25
PIF_VALUE: 24
PIF_VALUE: 24
PIF_VALUE: 25
PIF_VALUE: 23
PIF_VALUE: 0
PIF_VALUE: 0
PIF_VALUE: 24
PIF_VALUE: 25
PIF_VALUE: 24
PIF_VALUE: 24
PIF_VALUE: 33
PIF_VALUE: 2
PIF_VALUE: 24
PIF_VALUE: 12
PIF_VALUE: 24
PIF_VALUE: 2
PIF_VALUE: 13
PIF_VALUE: 0
PIF_VALUE: 24
PIF_VALUE: 24
PIF_VALUE: 23
PIF_VALUE: 24
PIF_VALUE: 24
PIF_VALUE: 13
PIF_VALUE: 26
PIF_VALUE: 24
PIF_VALUE: 22
PIF_VALUE: 24
PIF_VALUE: 0
PIF_VALUE: 23
PIF_VALUE: 24
PIF_VALUE: 0
PIF_VALUE: 12
PIF_VALUE: 26
PIF_VALUE: 24
PIF_VALUE: 23
PIF_VALUE: 0
PIF_VALUE: 0
PIF_VALUE: 23
PIF_VALUE: 13
PIF_VALUE: 0
PIF_VALUE: 24
PIF_VALUE: 1
PIF_VALUE: 1
PIF_VALUE: 12
PIF_VALUE: 23
PIF_VALUE: 25
PIF_VALUE: 24
PIF_VALUE: 0
PIF_VALUE: 24
PIF_VALUE: 0
PIF_VALUE: 3
PIF_VALUE: 24
PIF_VALUE: 24
PIF_VALUE: 23
PIF_VALUE: 2
PIF_VALUE: 24
PIF_VALUE: 2
PIF_VALUE: 0
PIF_VALUE: 20
PIF_VALUE: 24
PIF_VALUE: 20
PIF_VALUE: 24
PIF_VALUE: 24
PIF_VALUE: 12
PIF_VALUE: 12
PIF_VALUE: 26
PIF_VALUE: 23
PIF_VALUE: 24
PIF_VALUE: 24
PIF_VALUE: 3

## 2022-03-04 ASSESSMENT — PAIN SCALES - WONG BAKER
WONGBAKER_NUMERICALRESPONSE: 2
WONGBAKER_NUMERICALRESPONSE: 4

## 2022-03-04 ASSESSMENT — PAIN DESCRIPTION - DESCRIPTORS
DESCRIPTORS: DISCOMFORT
DESCRIPTORS: ACHING

## 2022-03-04 ASSESSMENT — PAIN DESCRIPTION - FREQUENCY
FREQUENCY: CONTINUOUS

## 2022-03-04 ASSESSMENT — PAIN DESCRIPTION - LOCATION
LOCATION: FOOT

## 2022-03-04 ASSESSMENT — LIFESTYLE VARIABLES: SMOKING_STATUS: 0

## 2022-03-04 ASSESSMENT — PAIN SCALES - GENERAL
PAINLEVEL_OUTOF10: 9
PAINLEVEL_OUTOF10: 8
PAINLEVEL_OUTOF10: 9
PAINLEVEL_OUTOF10: 8
PAINLEVEL_OUTOF10: 0

## 2022-03-04 ASSESSMENT — PAIN DESCRIPTION - ONSET
ONSET: ON-GOING

## 2022-03-04 ASSESSMENT — PAIN DESCRIPTION - PAIN TYPE
TYPE: SURGICAL PAIN

## 2022-03-04 ASSESSMENT — PAIN - FUNCTIONAL ASSESSMENT: PAIN_FUNCTIONAL_ASSESSMENT: 0-10

## 2022-03-04 NOTE — ANESTHESIA PRE PROCEDURE
Department of Anesthesiology  Preprocedure Note       Name:  Claudia Voss   Age:  42 y.o.  :  1980                                          MRN:  [id number]         Date:  3/4/2022      Surgeon: Bárbara Martinez):  Clau Galdamez DPM    Procedure: Procedure(s):  LEFT SECOND THROUGH FIFTH HAMMER TOE CORRECTION  MEDIAL COLUMN FUSION LAPIDUS BUNIONECTOMY TENOTOMY AND CAPSULOTOMY 2,3,4,5 LEFT FOOT    Medications prior to admission:   Prior to Admission medications    Medication Sig Start Date End Date Taking? Authorizing Provider   rivaroxaban (XARELTO) 10 MG TABS tablet Take 1 tablet by mouth daily (with breakfast) 3/4/22  Yes Mariama Labrum, DPM   doxycycline hyclate (VIBRA-TABS) 100 MG tablet Take 1 tablet by mouth 2 times daily for 7 days 3/4/22 3/11/22 Yes Mariama Labrum, DPM   rivaroxaban (XARELTO) 10 MG TABS tablet Take 10 mg by mouth daily (with breakfast)   Yes Historical Provider, MD   mupirocin (BACTROBAN) 2 % ointment Apply topically 3 times daily Apply topically 3 times daily. Yes Historical Provider, MD   promethazine (PHENERGAN) 25 MG tablet Take 25 mg by mouth every 6 hours as needed for Nausea   Yes Historical Provider, MD   acetaminophen (TYLENOL) 325 MG tablet Take 650 mg by mouth every 4 hours as needed for Pain   Yes Historical Provider, MD   oxyCODONE HCl (OXY-IR) 10 MG immediate release tablet Take 10 mg by mouth daily.    Yes Historical Provider, MD   topiramate (TOPAMAX) 25 MG tablet Take 25 mg by mouth 2 times daily   Yes Historical Provider, MD   polyethylene glycol (MIRALAX) 17 g PACK packet Take 17 g by mouth 2 times daily   Yes Historical Provider, MD   lactulose (CEPHULAC) 10 g packet Take 10 g by mouth daily   Yes Historical Provider, MD   sucralfate (CARAFATE) 1 GM/10ML suspension Take 1 g by mouth 4 times daily As needed   Yes Historical Provider, MD   lamoTRIgine (LAMICTAL) 100 MG tablet take 1 tablet by mouth at bedtime 21  Yes Historical Provider, MD   oxyCODONE ER [facility]) 13.5 MG C12A Take by mouth 2 times daily. Yes Historical Provider, MD   lidocaine (HM LIDOCAINE PATCH) 4 % external patch Place 1 patch onto the skin daily   Yes Historical Provider, MD   tiZANidine (ZANAFLEX) 2 MG tablet Take 2 mg by mouth 3 times daily   Yes Historical Provider, MD   acarbose (PRECOSE) 100 MG tablet Take 100 mg by mouth 4 times daily  4/25/19  Yes Historical Provider, MD   albuterol sulfate HFA (PROVENTIL HFA) 108 (90 Base) MCG/ACT inhaler Inhale 2 puffs into the lungs every 6 hours as needed  12/11/18  Yes Historical Provider, MD   ALPRAZolam (XANAX) 0.5 MG tablet Take 0.5 mg by mouth 4 times daily as needed.   1/10/19  Yes Historical Provider, MD   benztropine (COGENTIN) 2 MG tablet Take 2 mg by mouth 2 times daily  4/27/19  Yes Historical Provider, MD   cloZAPine (CLOZARIL) 100 MG tablet Take 300 mg by mouth nightly  12/11/18  Yes Historical Provider, MD SUBRAMANIAN VITAMIN B-12 TR 1000 MCG TBCR Take 1 tablet by mouth daily  2/28/19  Yes Historical Provider, MD   EPINEPHrine (Shahid  2-MARIBEL) 0.15 MG/0.3ML SOAJ Inject into the skin once as needed  3/12/08  Yes Historical Provider, MD   vitamin D (ERGOCALCIFEROL) 69953 units CAPS capsule Take 50,000 Units by mouth every 14 days  5/1/19  Yes Historical Provider, MD   estradiol (VIVELLE) 0.1 MG/24HR Place 1 patch onto the skin Twice a Week Indications: Sun and Wed  5/1/19  Yes Historical Provider, MD   famotidine (PEPCID) 20 MG tablet Take 20 mg by mouth 2 times daily  4/27/19  Yes Historical Provider, MD   levothyroxine (SYNTHROID) 100 MCG tablet Take 100 mcg by mouth Daily  12/12/18  Yes Historical Provider, MD   sertraline (ZOLOFT) 100 MG tablet Take 150 mg by mouth daily  5/7/19  Yes Historical Provider, MD       Current medications:    Current Facility-Administered Medications   Medication Dose Route Frequency Provider Last Rate Last Admin    ceFAZolin (ANCEF) 2000 mg in sterile water 20 mL IV syringe  2,000 mg IntraVENous 30 Min Pre-Op Eddie Star Take 1 g by mouth 4 times daily As needed      lamoTRIgine (LAMICTAL) 100 MG tablet take 1 tablet by mouth at bedtime      oxyCODONE ER (XTAMPZA ER) 13.5 MG C12A Take by mouth 2 times daily.  lidocaine (HM LIDOCAINE PATCH) 4 % external patch Place 1 patch onto the skin daily      tiZANidine (ZANAFLEX) 2 MG tablet Take 2 mg by mouth 3 times daily      acarbose (PRECOSE) 100 MG tablet Take 100 mg by mouth 4 times daily   0    albuterol sulfate HFA (PROVENTIL HFA) 108 (90 Base) MCG/ACT inhaler Inhale 2 puffs into the lungs every 6 hours as needed       ALPRAZolam (XANAX) 0.5 MG tablet Take 0.5 mg by mouth 4 times daily as needed.  benztropine (COGENTIN) 2 MG tablet Take 2 mg by mouth 2 times daily   0    cloZAPine (CLOZARIL) 100 MG tablet Take 300 mg by mouth nightly       RA VITAMIN B-12 TR 1000 MCG TBCR Take 1 tablet by mouth daily   0    EPINEPHrine (EPIPEN JR 2-MARIBEL) 0.15 MG/0.3ML SOAJ Inject into the skin once as needed       vitamin D (ERGOCALCIFEROL) 90484 units CAPS capsule Take 50,000 Units by mouth every 14 days   0    estradiol (VIVELLE) 0.1 MG/24HR Place 1 patch onto the skin Twice a Week Indications: Sun and Wed   1    famotidine (PEPCID) 20 MG tablet Take 20 mg by mouth 2 times daily   0    levothyroxine (SYNTHROID) 100 MCG tablet Take 100 mcg by mouth Daily       sertraline (ZOLOFT) 100 MG tablet Take 150 mg by mouth daily   0       Allergies:     Allergies   Allergen Reactions    Latex Rash     second degree burn    Fish Allergy Anaphylaxis    Adhesive Tape Other (See Comments)     Redness, blisters    Fentanyl Other (See Comments)     Fentanyl patch caused panic attack    Hydrocodone-Acetaminophen      GI BLEED    Iodine     Ketorolac Tromethamine Nausea Only    Medfix Ez [Wound Dressings]     Molds & Smuts     Other      pollen    Pregabalin Other (See Comments)    Tramadol Nausea Only    Buspirone Rash    Clindamycin Rash    Gabapentin Rash    Metronidazole Rash    Morphine Rash     IV \"a red line started going up towards my heart\"       Problem List:    Patient Active Problem List   Diagnosis Code    Fracture dislocation of toe joint, left, closed, initial encounter S92.912A    Dysplastic Nevus of back D48.5    Ankle arthritis M19.079    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    Status post osteotomy Z98.890    Mild intermittent asthma J45.20    Acquired hypothyroidism E03.9       Past Medical History:        Diagnosis Date    Agoraphobia     Arthritis     osteoarthritis    Asthma     well controlled 22    Atypical nevi     SEV ATN L flank , Mod to Sev ATN L mid back     Bipolar 1 disorder ([address]     Borderline personality disorder ([address]     Claustrophobia     Diabetes mellitus ([address]     Difficulty walking     lack of coordination    Dissociative disorder     Fibromyalgia     Foot pain, right     Generalized anxiety disorder     GERD (gastroesophageal reflux disease)     H/O migraine     Hidradenitis suppurativa     usually axillas    Hypoglycemia     IBS (irritable bowel syndrome)     Muscle weakness     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     SCHEDULED   FOR THE SURGERY ON 21    Nutritional anemia     Osteopenia     Other manic episodes (HCC)     Paranoid schizophrenia ([address]     Peripheral autonomic neuropathy     PONV (postoperative nausea and vomiting)     Reduced mobility     Seizure ([address]     if sugar drops    Severe depression ([address]     Thyroid disease        Past Surgical History:        Procedure Laterality Date    ANKLE SURGERY Left 2009    CARPAL TUNNEL RELEASE Left      SECTION      ENDOMETRIAL ABLATION      Novasure    FOOT FRACTURE SURGERY Left 2019    REMOVAL PAINFUL FIXATION THIRD TOE LEFT FOOT performed by Edson Diallo DPM at [place] [address]     GASTRIC BYPASS SURGERY      GASTROCNEMIUS RECESSION Left 2021 LEFT CALCANEAL OSTEOTOMY , REMOVAL HARDWARE AND REPAIR OF MALLEOLAR FRACTURE performed by Carmine Wei DPM at [facility] [address]  2005    left abdomen    HYSTERECTOMY  2009    KNEE ARTHROSCOPY Left 2012    KNEE ARTHROSCOPY Right     x 2 in 2014    LEG TENDON SURGERY Left     OTHER SURGICAL HISTORY  2009    staples removed from hernia    OTHER SURGICAL HISTORY Left 2020    dr escalera- excision dysplactis lesionx2 left flank and back    OVARY REMOVAL  2006    ROTATOR CUFF REPAIR Right 1997    ROTATOR CUFF REPAIR Right 2012    revision    SHOULDER ARTHROSCOPY Right     SKIN GRAFT Left 2022    SPLIT THICKNESS SKIN GRAFT FROM LEFT THIGH TO LEFT ANKLE,APPLICATION OF WEI WOUND VAC performed by Carmine Wei DPM [facility] Left 2008    TEMPOROMANDIBULAR JOINT SURGERY Bilateral    [address] Left 2021    LEFT ANKLE FUSION TAKEDOWN LEFT TOTAL ANKLE REPLACEMENT performed by Carmine Wei DPM at [address] [facility]      VAGAL NERVE STIMULATION  2006 battery, removed     [address] [facility]       Social History:    Social History     Tobacco Use    Smoking status: Former Smoker     Packs/day: 1.00     Years: 28.00     Pack years: 28.00     Types: Cigarettes     Start date:      Quit date: 2020     Years since quittin.5    Smokeless tobacco: Never Used    Tobacco comment: smoked on and off for the 28 years cigarettes and cigars   Substance Use Topics    Alcohol use: Not Currently     Comment: sober since                                 Counseling given: Not Answered  Comment: smoked on and off for the 28 years cigarettes and cigars      Vital Signs (Current):   Vitals:    22 1043 22 1046 22 1100 22 1119   BP: 135/76 (!) 146/89 (!) 140/84 (!) 147/94   Pulse: 81 85 87 83   Resp: 11 20 20 20   Temp: 35.6 °C (96 °F) (!) 9.8 °C (49.6 °F) TempSrc:  Temporal     SpO2: 95% 96% 98% 98%   Weight:       Height:                                                  BP Readings from Last 3 Encounters:   03/04/22 (!) 147/94   01/31/22 (!) 106/57   01/31/22 137/69       NPO Status: Time of last liquid consumption: 2230                        Time of last solid consumption: 2230                        Date of last liquid consumption: 03/03/22                        Date of last solid food consumption: 03/03/22    BMI:   Wt Readings from Last 3 Encounters:   03/04/22 229 lb (103.9 kg)   01/31/22 223 lb (101.2 kg)   12/06/21 224 lb (101.6 kg)     Body mass index is 36.96 kg/m². CBC:   Lab Results   Component Value Date    WBC 8.0 02/22/2022    RBC 4.35 02/22/2022    RBC 4.67 08/06/2021    HGB 12.1 02/22/2022    HCT 39.8 02/22/2022    MCV 91.5 02/22/2022    RDW 14.1 02/22/2022     02/22/2022       CMP:   Lab Results   Component Value Date     02/22/2022    K 4.2 02/22/2022    K 3.6 12/07/2021     02/22/2022    CO2 22 02/22/2022    BUN 8 02/22/2022    CREATININE 0.7 02/22/2022    GFRAA >60 02/22/2022    LABGLOM >60 02/22/2022    GLUCOSE 88 02/22/2022    PROT 6.6 01/18/2022    CALCIUM 8.7 02/22/2022    BILITOT <0.2 01/18/2022    ALKPHOS 109 01/18/2022    AST 10 01/18/2022    ALT 7 01/18/2022       POC Tests: No results for input(s): POCGLU, POCNA, POCK, POCCL, POCBUN, POCHEMO, POCHCT in the last 72 hours.     Coags: No results found for: PROTIME, INR, APTT    HCG (If Applicable): No results found for: PREGTESTUR, PREGSERUM, HCG, HCGQUANT     ABGs: No results found for: PHART, PO2ART, ZUE3ERP, XTD0ZLN, BEART, J3MCLTXT     Type & Screen (If Applicable):  No results found for: LABABO, LABRH    Drug/Infectious Status (If Applicable):  No results found for: HIV, HEPCAB    COVID-19 Screening (If Applicable):   Lab Results   Component Value Date    COVID19 Not Detected 12/08/2021           Anesthesia Evaluation     Anesthesia Plan        kiran gilliland, LE - CRNA   3/4/2022

## 2022-03-04 NOTE — ANESTHESIA PRE PROCEDURE
Department of Anesthesiology  Preprocedure Note       Name:  Zeinab Boucher   Age:  42 y.o.  :  1980                                          MRN:  [id number]         Date:  3/4/2022      Surgeon: Kimberly Gerber):  Haven Juarez DPM    Procedure: Procedure(s):  LEFT SECOND THROUGH FIFTH HAMMER TOE CORRECTION  MEDIAL COLUMN FUSION LAPIDUS BUNIONECTOMY TENOTOMY AND CAPSULOTOMY 2,3,4,5 LEFT FOOT    Medications prior to admission:   Prior to Admission medications    Medication Sig Start Date End Date Taking? Authorizing Provider   rivaroxaban (XARELTO) 10 MG TABS tablet Take 1 tablet by mouth daily (with breakfast) 3/4/22  Yes Jaclyn Fort, DPM   doxycycline hyclate (VIBRA-TABS) 100 MG tablet Take 1 tablet by mouth 2 times daily for 7 days 3/4/22 3/11/22 Yes Jaclyn Fort, DPM   rivaroxaban (XARELTO) 10 MG TABS tablet Take 10 mg by mouth daily (with breakfast)   Yes Historical Provider, MD   mupirocin (BACTROBAN) 2 % ointment Apply topically 3 times daily Apply topically 3 times daily. Yes Historical Provider, MD   promethazine (PHENERGAN) 25 MG tablet Take 25 mg by mouth every 6 hours as needed for Nausea   Yes Historical Provider, MD   acetaminophen (TYLENOL) 325 MG tablet Take 650 mg by mouth every 4 hours as needed for Pain   Yes Historical Provider, MD   oxyCODONE HCl (OXY-IR) 10 MG immediate release tablet Take 10 mg by mouth daily.    Yes Historical Provider, MD   topiramate (TOPAMAX) 25 MG tablet Take 25 mg by mouth 2 times daily   Yes Historical Provider, MD   polyethylene glycol (MIRALAX) 17 g PACK packet Take 17 g by mouth 2 times daily   Yes Historical Provider, MD   lactulose (CEPHULAC) 10 g packet Take 10 g by mouth daily   Yes Historical Provider, MD   sucralfate (CARAFATE) 1 GM/10ML suspension Take 1 g by mouth 4 times daily As needed   Yes Historical Provider, MD   lamoTRIgine (LAMICTAL) 100 MG tablet take 1 tablet by mouth at bedtime 21  Yes Historical Provider, MD   oxyCODONE ER [facility]) 13.5 MG C12A Take by mouth 2 times daily. Yes Historical Provider, MD   lidocaine (HM LIDOCAINE PATCH) 4 % external patch Place 1 patch onto the skin daily   Yes Historical Provider, MD   tiZANidine (ZANAFLEX) 2 MG tablet Take 2 mg by mouth 3 times daily   Yes Historical Provider, MD   acarbose (PRECOSE) 100 MG tablet Take 100 mg by mouth 4 times daily  4/25/19  Yes Historical Provider, MD   albuterol sulfate HFA (PROVENTIL HFA) 108 (90 Base) MCG/ACT inhaler Inhale 2 puffs into the lungs every 6 hours as needed  12/11/18  Yes Historical Provider, MD   ALPRAZolam (XANAX) 0.5 MG tablet Take 0.5 mg by mouth 4 times daily as needed.   1/10/19  Yes Historical Provider, MD   benztropine (COGENTIN) 2 MG tablet Take 2 mg by mouth 2 times daily  4/27/19  Yes Historical Provider, MD   cloZAPine (CLOZARIL) 100 MG tablet Take 300 mg by mouth nightly  12/11/18  Yes Historical Provider, MD SUBRAMANIAN VITAMIN B-12 TR 1000 MCG TBCR Take 1 tablet by mouth daily  2/28/19  Yes Historical Provider, MD   EPINEPHrine (Brett Lango 2-MARIBEL) 0.15 MG/0.3ML SOAJ Inject into the skin once as needed  3/12/08  Yes Historical Provider, MD   vitamin D (ERGOCALCIFEROL) 44995 units CAPS capsule Take 50,000 Units by mouth every 14 days  5/1/19  Yes Historical Provider, MD   estradiol (VIVELLE) 0.1 MG/24HR Place 1 patch onto the skin Twice a Week Indications: Sun and Wed  5/1/19  Yes Historical Provider, MD   famotidine (PEPCID) 20 MG tablet Take 20 mg by mouth 2 times daily  4/27/19  Yes Historical Provider, MD   levothyroxine (SYNTHROID) 100 MCG tablet Take 100 mcg by mouth Daily  12/12/18  Yes Historical Provider, MD   sertraline (ZOLOFT) 100 MG tablet Take 150 mg by mouth daily  5/7/19  Yes Historical Provider, MD       Current medications:    Current Facility-Administered Medications   Medication Dose Route Frequency Provider Last Rate Last Admin    ceFAZolin (ANCEF) 2000 mg in sterile water 20 mL IV syringe  2,000 mg IntraVENous 30 Min Pre-Op Robert Ramirez Take 1 g by mouth 4 times daily As needed      lamoTRIgine (LAMICTAL) 100 MG tablet take 1 tablet by mouth at bedtime      oxyCODONE ER (XTAMPZA ER) 13.5 MG C12A Take by mouth 2 times daily.  lidocaine (HM LIDOCAINE PATCH) 4 % external patch Place 1 patch onto the skin daily      tiZANidine (ZANAFLEX) 2 MG tablet Take 2 mg by mouth 3 times daily      acarbose (PRECOSE) 100 MG tablet Take 100 mg by mouth 4 times daily   0    albuterol sulfate HFA (PROVENTIL HFA) 108 (90 Base) MCG/ACT inhaler Inhale 2 puffs into the lungs every 6 hours as needed       ALPRAZolam (XANAX) 0.5 MG tablet Take 0.5 mg by mouth 4 times daily as needed.  benztropine (COGENTIN) 2 MG tablet Take 2 mg by mouth 2 times daily   0    cloZAPine (CLOZARIL) 100 MG tablet Take 300 mg by mouth nightly       RA VITAMIN B-12 TR 1000 MCG TBCR Take 1 tablet by mouth daily   0    EPINEPHrine (EPIPEN JR 2-MARIBEL) 0.15 MG/0.3ML SOAJ Inject into the skin once as needed       vitamin D (ERGOCALCIFEROL) 12876 units CAPS capsule Take 50,000 Units by mouth every 14 days   0    estradiol (VIVELLE) 0.1 MG/24HR Place 1 patch onto the skin Twice a Week Indications: Sun and Wed   1    famotidine (PEPCID) 20 MG tablet Take 20 mg by mouth 2 times daily   0    levothyroxine (SYNTHROID) 100 MCG tablet Take 100 mcg by mouth Daily       sertraline (ZOLOFT) 100 MG tablet Take 150 mg by mouth daily   0       Allergies:     Allergies   Allergen Reactions    Latex Rash     second degree burn    Fish Allergy Anaphylaxis    Adhesive Tape Other (See Comments)     Redness, blisters    Fentanyl Other (See Comments)     Fentanyl patch caused panic attack    Hydrocodone-Acetaminophen      GI BLEED    Iodine     Ketorolac Tromethamine Nausea Only    Medfix Ez [Wound Dressings]     Molds & Smuts     Other      pollen    Pregabalin Other (See Comments)    Tramadol Nausea Only    Buspirone Rash    Clindamycin Rash    Gabapentin Rash    Metronidazole LEFT CALCANEAL OSTEOTOMY , REMOVAL HARDWARE AND REPAIR OF MALLEOLAR FRACTURE performed by Sai Caicedo DPM at [facility] [address]  2005    left abdomen    HYSTERECTOMY  2009    KNEE ARTHROSCOPY Left 2012    KNEE ARTHROSCOPY Right     x 2 in 2014    LEG TENDON SURGERY Left     OTHER SURGICAL HISTORY  2009    staples removed from hernia    OTHER SURGICAL HISTORY Left 2020    dr escalera- excision dysplactis lesionx2 left flank and back    OVARY REMOVAL  2006    ROTATOR CUFF REPAIR Right 1997    ROTATOR CUFF REPAIR Right 2012    revision    SHOULDER ARTHROSCOPY Right     SKIN GRAFT Left 2022    SPLIT THICKNESS SKIN GRAFT FROM LEFT THIGH TO LEFT ANKLE,APPLICATION OF WEI WOUND VAC performed by Sai Caicedo DPM at [facility] Left 2008    TEMPOROMANDIBULAR JOINT SURGERY Bilateral    [address] Left 2021    LEFT ANKLE FUSION TAKEDOWN LEFT TOTAL ANKLE REPLACEMENT performed by Sai Caicedo DPM at [address] [facility]      VAGAL NERVE STIMULATION  2006 battery, removed     [address] [facility]       Social History:    Social History     Tobacco Use    Smoking status: Former Smoker     Packs/day: 1.00     Years: 28.00     Pack years: 28.00     Types: Cigarettes     Start date:      Quit date: 2020     Years since quittin.5    Smokeless tobacco: Never Used    Tobacco comment: smoked on and off for the 28 years cigarettes and cigars   Substance Use Topics    Alcohol use: Not Currently     Comment: sober since                                 Counseling given: Not Answered  Comment: smoked on and off for the 28 years cigarettes and cigars      Vital Signs (Current):   Vitals:    22 1043 22 1046 22 1100 22 1119   BP: 135/76 (!) 146/89 (!) 140/84 (!) 147/94   Pulse: 81 85 87 83   Resp: 11 20 20 20   Temp: 35.6 °C (96 °F) (!) 9.8 °C (49.6 °F) TempSrc:  Temporal     SpO2: 95% 96% 98% 98%   Weight:       Height:                                                  BP Readings from Last 3 Encounters:   03/04/22 (!) 147/94   01/31/22 (!) 106/57   01/31/22 137/69       NPO Status: Time of last liquid consumption: 2230                        Time of last solid consumption: 2230                        Date of last liquid consumption: 03/03/22                        Date of last solid food consumption: 03/03/22    BMI:   Wt Readings from Last 3 Encounters:   03/04/22 229 lb (103.9 kg)   01/31/22 223 lb (101.2 kg)   12/06/21 224 lb (101.6 kg)     Body mass index is 36.96 kg/m². CBC:   Lab Results   Component Value Date    WBC 8.0 02/22/2022    RBC 4.35 02/22/2022    RBC 4.67 08/06/2021    HGB 12.1 02/22/2022    HCT 39.8 02/22/2022    MCV 91.5 02/22/2022    RDW 14.1 02/22/2022     02/22/2022       CMP:   Lab Results   Component Value Date     02/22/2022    K 4.2 02/22/2022    K 3.6 12/07/2021     02/22/2022    CO2 22 02/22/2022    BUN 8 02/22/2022    CREATININE 0.7 02/22/2022    GFRAA >60 02/22/2022    LABGLOM >60 02/22/2022    GLUCOSE 88 02/22/2022    PROT 6.6 01/18/2022    CALCIUM 8.7 02/22/2022    BILITOT <0.2 01/18/2022    ALKPHOS 109 01/18/2022    AST 10 01/18/2022    ALT 7 01/18/2022       POC Tests: No results for input(s): POCGLU, POCNA, POCK, POCCL, POCBUN, POCHEMO, POCHCT in the last 72 hours.     Coags: No results found for: PROTIME, INR, APTT    HCG (If Applicable): No results found for: PREGTESTUR, PREGSERUM, HCG, HCGQUANT     ABGs: No results found for: PHART, PO2ART, IAY3EGE, ZOW8QAO, BEART, [id number]     Type & Screen (If Applicable):  No results found for: LABABO, LABRH    Drug/Infectious Status (If Applicable):  No results found for: HIV, HEPCAB    COVID-19 Screening (If Applicable):   Lab Results   Component Value Date    COVID19 Not Detected 12/08/2021           Anesthesia Evaluation     Anesthesia Plan        kiran gilliland,

## 2022-03-04 NOTE — ANESTHESIA PRE PROCEDURE
Department of Anesthesiology  Preprocedure Note       Name:  Tarik Abraham   Age:  42 y.o.  :  1980                                          MRN:  [id number]         Date:  3/4/2022      Surgeon: Nela Gonzalez):  Garold Spurling, DPM    Procedure: Procedure(s):  LEFT SECOND THROUGH FIFTH HAMMER TOE CORRECTION  MEDIAL COLUMN FUSION POSSIBLE GASTROCNEMIUS RECESSION   ++[id number]++  ++LATEX ALLERGY IODINE ALLERGY++    Medications prior to admission:   Prior to Admission medications    Medication Sig Start Date End Date Taking? Authorizing Provider   rivaroxaban (XARELTO) 10 MG TABS tablet Take 10 mg by mouth daily (with breakfast)   Yes Historical Provider, MD   mupirocin (BACTROBAN) 2 % ointment Apply topically 3 times daily Apply topically 3 times daily. Yes Historical Provider, MD   promethazine (PHENERGAN) 25 MG tablet Take 25 mg by mouth every 6 hours as needed for Nausea   Yes Historical Provider, MD   acetaminophen (TYLENOL) 325 MG tablet Take 650 mg by mouth every 4 hours as needed for Pain   Yes Historical Provider, MD   oxyCODONE HCl (OXY-IR) 10 MG immediate release tablet Take 10 mg by mouth daily. Yes Historical Provider, MD   topiramate (TOPAMAX) 25 MG tablet Take 25 mg by mouth 2 times daily   Yes Historical Provider, MD   polyethylene glycol (MIRALAX) 17 g PACK packet Take 17 g by mouth 2 times daily   Yes Historical Provider, MD   lactulose (CEPHULAC) 10 g packet Take 10 g by mouth daily   Yes Historical Provider, MD   sucralfate (CARAFATE) 1 GM/10ML suspension Take 1 g by mouth 4 times daily As needed   Yes Historical Provider, MD   lamoTRIgine (LAMICTAL) 100 MG tablet take 1 tablet by mouth at bedtime 21  Yes Historical Provider, MD   oxyCODONE ER (XTAMPZA ER) 13.5 MG C12A Take by mouth 2 times daily.     Yes Historical Provider, MD   lidocaine (HM LIDOCAINE PATCH) 4 % external patch Place 1 patch onto the skin daily   Yes Historical Provider, MD   tiZANidine (ZANAFLEX) 2 MG tablet Take 2 mg by mouth 3 times daily   Yes Historical Provider, MD   acarbose (PRECOSE) 100 MG tablet Take 100 mg by mouth 4 times daily  4/25/19  Yes Historical Provider, MD   albuterol sulfate HFA (PROVENTIL HFA) 108 (90 Base) MCG/ACT inhaler Inhale 2 puffs into the lungs every 6 hours as needed  12/11/18  Yes Historical Provider, MD   ALPRAZolam (XANAX) 0.5 MG tablet Take 0.5 mg by mouth 4 times daily as needed. 1/10/19  Yes Historical Provider, MD   benztropine (COGENTIN) 2 MG tablet Take 2 mg by mouth 2 times daily  4/27/19  Yes Historical Provider, MD   cloZAPine (CLOZARIL) 100 MG tablet Take 300 mg by mouth nightly  12/11/18  Yes Historical Provider, MD SUBRAMANIAN VITAMIN B-12 TR 1000 MCG TBCR Take 1 tablet by mouth daily  2/28/19  Yes Historical Provider, MD   EPINEPHrine (Veatrice Radar 2-MARIBEL) 0.15 MG/0.3ML SOAJ Inject into the skin once as needed  3/12/08  Yes Historical Provider, MD   vitamin D (ERGOCALCIFEROL) 39665 units CAPS capsule Take 50,000 Units by mouth every 14 days  5/1/19  Yes Historical Provider, MD   estradiol (VIVELLE) 0.1 MG/24HR Place 1 patch onto the skin Twice a Week Indications: Sun and Wed  5/1/19  Yes Historical Provider, MD   famotidine (PEPCID) 20 MG tablet Take 20 mg by mouth 2 times daily  4/27/19  Yes Historical Provider, MD   levothyroxine (SYNTHROID) 100 MCG tablet Take 100 mcg by mouth Daily  12/12/18  Yes Historical Provider, MD   sertraline (ZOLOFT) 100 MG tablet Take 150 mg by mouth daily  5/7/19  Yes Historical Provider, MD       Current medications:    Current Facility-Administered Medications   Medication Dose Route Frequency Provider Last Rate Last Admin    ceFAZolin (ANCEF) 2000 mg in sterile water 20 mL IV syringe  2,000 mg IntraVENous 30 Min Pre-Op Yasir Pina DPM           Allergies:     Allergies   Allergen Reactions    Latex Rash     second degree burn    Fish Allergy Anaphylaxis    Adhesive Tape Other (See Comments)     Redness, blisters    Fentanyl Other (See Comments)     Fentanyl patch caused panic attack    Hydrocodone-Acetaminophen      GI BLEED    Iodine     Ketorolac Tromethamine Nausea Only    Medfix Ez [Wound Dressings]     Molds & Smuts     Other      pollen    Pregabalin Other (See Comments)    Tramadol Nausea Only    Buspirone Rash    Clindamycin Rash    Gabapentin Rash    Metronidazole Rash    Morphine Rash     IV \"a red line started going up towards my heart\"       Problem List:    Patient Active Problem List   Diagnosis Code    Fracture dislocation of toe joint, left, closed, initial encounter S92.912A    Dysplastic Nevus of back D48.5    Ankle arthritis M19.079    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    Status post osteotomy Z98.890    Mild intermittent asthma J45.20    Acquired hypothyroidism E03.9       Past Medical History:        Diagnosis Date    Agoraphobia     Arthritis     osteoarthritis    Asthma     well controlled 1/27/22    Atypical nevi     SEV ATN L flank 2019, Mod to Sev ATN L mid back 2019    Bipolar 1 disorder (HCC)     Borderline personality disorder ([address]     Claustrophobia     Diabetes mellitus ([address]     Difficulty walking     lack of coordination    Dissociative disorder     Fibromyalgia     Foot pain, right     Generalized anxiety disorder     GERD (gastroesophageal reflux disease)     H/O migraine     Hidradenitis suppurativa     usually axillas    Hypoglycemia     IBS (irritable bowel syndrome)     Muscle weakness     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     SCHEDULED   FOR THE SURGERY ON 08/18/21    Nutritional anemia     Osteopenia     Other manic episodes (HCC)     Paranoid schizophrenia (HCC)     Peripheral autonomic neuropathy     PONV (postoperative nausea and vomiting)     Reduced mobility     Seizure (HCC)     if sugar drops    Severe depression ([address]     Thyroid disease        Past Surgical History:        Procedure Laterality Date    ANKLE SURGERY Left 2009    CARPAL TUNNEL RELEASE Left      SECTION  2004    ENDOMETRIAL ABLATION  2009    Novasure    FOOT FRACTURE SURGERY Left 2019    REMOVAL PAINFUL FIXATION THIRD TOE LEFT FOOT performed by Sorin Hallman DPM at [facility]       x3    [address] [facility] Left 2021    LEFT CALCANEAL OSTEOTOMY , REMOVAL HARDWARE AND REPAIR OF MALLEOLAR FRACTURE performed by Johana Jordan DPM at [address]  2005    left abdomen    HYSTERECTOMY  2009    KNEE ARTHROSCOPY Left     KNEE ARTHROSCOPY Right     x 2 in 2014   [address]     OTHER SURGICAL HISTORY  2009    staples removed from hernia    OTHER SURGICAL HISTORY Left 2020    dr escalera- excision dysplactis lesionx2 left flank and back    OVARY REMOVAL  2006    ROTATOR CUFF REPAIR Right 1997    ROTATOR CUFF REPAIR Right     revision    SHOULDER ARTHROSCOPY Right     SKIN GRAFT Left 2022    SPLIT THICKNESS SKIN GRAFT FROM LEFT THIGH TO LEFT ANKLE,APPLICATION OF WEI WOUND VAC performed by Johana Jordan DPM at [place] [address] Left     TEMPOROMANDIBULAR JOINT SURGERY Bilateral    [address] Left 2021    LEFT ANKLE FUSION TAKEDOWN LEFT TOTAL ANKLE REPLACEMENT performed by Johana Jordan DPM at [facility]  2004   [address] [facility] STIMULATION  2006     battery, removed     [address] EXTRACTION         Social History:    Social History     Tobacco Use    Smoking status: Former Smoker     Packs/day: 1.00     Years: 28.00     Pack years: 28.00     Types: Cigarettes     Start date:      Quit date: 2020     Years since quittin.5    Smokeless tobacco: Never Used    Tobacco comment: smoked on and off for the 28 years cigarettes and cigars   Substance Use Topics    Alcohol use: Not Currently     Comment: sober since  Counseling given: Not Answered  Comment: smoked on and off for the 28 years cigarettes and cigars      Vital Signs (Current):   Vitals:    02/23/22 1243 03/04/22 0549 03/04/22 0610   BP:   110/68   Pulse:   69   Resp:   16   Temp:   36.4 °C (97.6 °F)   TempSrc:   Temporal   SpO2:   98%   Weight: 229 lb (103.9 kg) 229 lb (103.9 kg)    Height: 5' 6\" (1.676 m) 5' 6\" (1.676 m)                                               BP Readings from Last 3 Encounters:   03/04/22 110/68   01/31/22 (!) 106/57   01/31/22 137/69       NPO Status: Time of last liquid consumption: 2230                        Time of last solid consumption: 2230                        Date of last liquid consumption: 03/03/22                        Date of last solid food consumption: 03/03/22    BMI:   Wt Readings from Last 3 Encounters:   03/04/22 229 lb (103.9 kg)   01/31/22 223 lb (101.2 kg)   12/06/21 224 lb (101.6 kg)     Body mass index is 36.96 kg/m². CBC:   Lab Results   Component Value Date    WBC 8.0 02/22/2022    RBC 4.35 02/22/2022    RBC 4.67 08/06/2021    HGB 12.1 02/22/2022    HCT 39.8 02/22/2022    MCV 91.5 02/22/2022    RDW 14.1 02/22/2022     02/22/2022       CMP:   Lab Results   Component Value Date     02/22/2022    K 4.2 02/22/2022    K 3.6 12/07/2021     02/22/2022    CO2 22 02/22/2022    BUN 8 02/22/2022    CREATININE 0.7 02/22/2022    GFRAA >60 02/22/2022    LABGLOM >60 02/22/2022    GLUCOSE 88 02/22/2022    PROT 6.6 01/18/2022    CALCIUM 8.7 02/22/2022    BILITOT <0.2 01/18/2022    ALKPHOS 109 01/18/2022    AST 10 01/18/2022    ALT 7 01/18/2022       POC Tests: No results for input(s): POCGLU, POCNA, POCK, POCCL, POCBUN, POCHEMO, POCHCT in the last 72 hours. Coags: No results found for: PROTIME, INR, APTT    HCG (If Applicable): No results found for: PREGTESTUR, PREGSERUM, HCG, HCGQUANT     ABGs: No results found for: PHART, PO2ART, ESB3NRA, UYO5IKZ, BEART, Z9WQAOIY     Type & Screen (If Applicable):   No results found for: Toy Deborah    Drug/Infectious Status (If Applicable):  No results found for: HIV, HEPCAB    COVID-19 Screening (If Applicable):   Lab Results   Component Value Date    COVID19 Not Detected 12/08/2021           Anesthesia Evaluation  Patient summary reviewed and Nursing notes reviewed   history of anesthetic complications: PONV. Airway: Mallampati: I  TM distance: >3 FB   Neck ROM: full  Mouth opening: > = 3 FB Dental:          Pulmonary: breath sounds clear to auscultation  (+) asthma (Patient took 2 puffs of albuterol this am ):                           ROS comment: Former smoker, quit 11 years ago   Cardiovascular:          ECG reviewed  Rhythm: regular  Rate: normal           Beta Blocker:  Not on Beta Blocker         Neuro/Psych:   (+) seizures (due to hypoglycemia ):, neuromuscular disease (fibromyalgia, peripheral neuropathy ):, headaches: migraine headaches, psychiatric history: stable with treatmentdepression/anxiety  (xanax 0.5 mg DOS)            GI/Hepatic/Renal:   (+) GERD: well controlled,           Endo/Other:    (+) DiabetesType II DM, well controlled, , hypothyroidism::., Cancer: Hx of melanoma. .    Cancer: Hx of melanoma. Abdominal:       Abdomen: soft. Vascular: negative vascular ROS. Other Findings:             Anesthesia Plan      general     ASA 3       Induction: intravenous. MIPS: Postoperative opioids intended and Prophylactic antiemetics administered. Anesthetic plan and risks discussed with patient. Plan discussed with CRNA and attending.                   Alis Cheney RN   3/4/2022

## 2022-03-04 NOTE — ANESTHESIA PRE PROCEDURE
Department of Anesthesiology  Preprocedure Note       Name:  Yola Gan   Age:  42 y.o.  :  1980                                          MRN:  [id number]         Date:  3/4/2022      Surgeon: Keny Abreu):  Michelle Mccurdy DPM    Procedure: Procedure(s):  LEFT SECOND THROUGH FIFTH HAMMER TOE CORRECTION  MEDIAL COLUMN FUSION POSSIBLE GASTROCNEMIUS RECESSION   ++[id number]++  ++LATEX ALLERGY IODINE ALLERGY++    Medications prior to admission:   Prior to Admission medications    Medication Sig Start Date End Date Taking? Authorizing Provider   rivaroxaban (XARELTO) 10 MG TABS tablet Take 10 mg by mouth daily (with breakfast)   Yes Historical Provider, MD   mupirocin (BACTROBAN) 2 % ointment Apply topically 3 times daily Apply topically 3 times daily. Yes Historical Provider, MD   promethazine (PHENERGAN) 25 MG tablet Take 25 mg by mouth every 6 hours as needed for Nausea   Yes Historical Provider, MD   acetaminophen (TYLENOL) 325 MG tablet Take 650 mg by mouth every 4 hours as needed for Pain   Yes Historical Provider, MD   oxyCODONE HCl (OXY-IR) 10 MG immediate release tablet Take 10 mg by mouth daily. Yes Historical Provider, MD   topiramate (TOPAMAX) 25 MG tablet Take 25 mg by mouth 2 times daily   Yes Historical Provider, MD   polyethylene glycol (MIRALAX) 17 g PACK packet Take 17 g by mouth 2 times daily   Yes Historical Provider, MD   lactulose (CEPHULAC) 10 g packet Take 10 g by mouth daily   Yes Historical Provider, MD   sucralfate (CARAFATE) 1 GM/10ML suspension Take 1 g by mouth 4 times daily As needed   Yes Historical Provider, MD   lamoTRIgine (LAMICTAL) 100 MG tablet take 1 tablet by mouth at bedtime 21  Yes Historical Provider, MD   oxyCODONE ER (XTAMPZA ER) 13.5 MG C12A Take by mouth 2 times daily.     Yes Historical Provider, MD   lidocaine (HM LIDOCAINE PATCH) 4 % external patch Place 1 patch onto the skin daily   Yes Historical Provider, MD   tiZANidine (ZANAFLEX) 2 MG tablet Take 2 mg by mouth 3 times daily   Yes Historical Provider, MD   acarbose (PRECOSE) 100 MG tablet Take 100 mg by mouth 4 times daily  4/25/19  Yes Historical Provider, MD   albuterol sulfate HFA (PROVENTIL HFA) 108 (90 Base) MCG/ACT inhaler Inhale 2 puffs into the lungs every 6 hours as needed  12/11/18  Yes Historical Provider, MD   ALPRAZolam (XANAX) 0.5 MG tablet Take 0.5 mg by mouth 4 times daily as needed.   1/10/19  Yes Historical Provider, MD   benztropine (COGENTIN) 2 MG tablet Take 2 mg by mouth 2 times daily  4/27/19  Yes Historical Provider, MD   cloZAPine (CLOZARIL) 100 MG tablet Take 300 mg by mouth nightly  12/11/18  Yes Historical Provider, MD SUBRAMANIAN VITAMIN B-12 TR 1000 MCG TBCR Take 1 tablet by mouth daily  2/28/19  Yes Historical Provider, MD   EPINEPHrine (Alonso Alosa 2-MARIBEL) 0.15 MG/0.3ML SOAJ Inject into the skin once as needed  3/12/08  Yes Historical Provider, MD   vitamin D (ERGOCALCIFEROL) 21528 units CAPS capsule Take 50,000 Units by mouth every 14 days  5/1/19  Yes Historical Provider, MD   estradiol (VIVELLE) 0.1 MG/24HR Place 1 patch onto the skin Twice a Week Indications: Sun and Wed  5/1/19  Yes Historical Provider, MD   famotidine (PEPCID) 20 MG tablet Take 20 mg by mouth 2 times daily  4/27/19  Yes Historical Provider, MD   levothyroxine (SYNTHROID) 100 MCG tablet Take 100 mcg by mouth Daily  12/12/18  Yes Historical Provider, MD   sertraline (ZOLOFT) 100 MG tablet Take 150 mg by mouth daily  5/7/19  Yes Historical Provider, MD       Current medications:    Current Facility-Administered Medications   Medication Dose Route Frequency Provider Last Rate Last Admin    ceFAZolin (ANCEF) 2000 mg in sterile water 20 mL IV syringe  2,000 mg IntraVENous 30 Min Pre-Op Radha Esters, DPM        metoclopramide (REGLAN) 5 MG/ML injection             sodium chloride flush 0.9 % injection 5-40 mL  5-40 mL IntraVENous 2 times per day Maurice Recinos,         sodium chloride flush 0.9 % injection 5-40 mL  5-40 mL IntraVENous PRN Maurice T Jorje, DO        0.9 % sodium chloride infusion  25 mL IntraVENous PRN Maurice T Jorje, DO        meperidine (DEMEROL) injection 12.5 mg  12.5 mg IntraVENous Q5 Min PRN Maurice T Jorje, DO        fentaNYL (SUBLIMAZE) injection 25 mcg  25 mcg IntraVENous Q5 Min PRN Maurice T Jorje, DO        HYDROmorphone (DILAUDID) injection 0.5 mg  0.5 mg IntraVENous Q5 Min PRN Maurice T Jorje, DO        prochlorperazine (COMPAZINE) injection 5 mg  5 mg IntraVENous Once PRN Maurice T Jorje, DO        diphenhydrAMINE (BENADRYL) injection 12.5 mg  12.5 mg IntraVENous Once PRN Maurice T Jorje, DO           Allergies:     Allergies   Allergen Reactions    Latex Rash     second degree burn    Fish Allergy Anaphylaxis    Adhesive Tape Other (See Comments)     Redness, blisters    Fentanyl Other (See Comments)     Fentanyl patch caused panic attack    Hydrocodone-Acetaminophen      GI BLEED    Iodine     Ketorolac Tromethamine Nausea Only    Medfix Ez [Wound Dressings]     Molds & Smuts     Other      pollen    Pregabalin Other (See Comments)    Tramadol Nausea Only    Buspirone Rash    Clindamycin Rash    Gabapentin Rash    Metronidazole Rash    Morphine Rash     IV \"a red line started going up towards my heart\"       Problem List:    Patient Active Problem List   Diagnosis Code    Fracture dislocation of toe joint, left, closed, initial encounter S92.912A    Dysplastic Nevus of back D48.5    Ankle arthritis M19.079    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    Status post osteotomy Z98.890    Mild intermittent asthma J45.20    Acquired hypothyroidism E03.9       Past Medical History:        Diagnosis Date    Agoraphobia     Arthritis     osteoarthritis    Asthma     well controlled 1/27/22    Atypical nevi     SEV ATN L flank 2019, Mod to Sev ATN L mid back 2019    Bipolar 1 disorder [facility]     Borderline personality disorder ([address]     Claustrophobia     Diabetes mellitus [facility]     Difficulty walking     lack of coordination    Dissociative disorder     Fibromyalgia     Foot pain, right     Generalized anxiety disorder     GERD (gastroesophageal reflux disease)     H/O migraine     Hidradenitis suppurativa     usually axillas    Hypoglycemia     IBS (irritable bowel syndrome)     Muscle weakness     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     SCHEDULED   FOR THE SURGERY ON 21    Nutritional anemia     Osteopenia     Other manic episodes ([facility]     Paranoid schizophrenia ([address]     Peripheral autonomic neuropathy     PONV (postoperative nausea and vomiting)     Reduced mobility     Seizure ([address]     if sugar drops    Severe depression ([address]     Thyroid disease        Past Surgical History:        Procedure Laterality Date    ANKLE SURGERY Left 2009    CARPAL TUNNEL RELEASE Left      SECTION  2004    ENDOMETRIAL ABLATION  2009    Novasure    FOOT FRACTURE SURGERY Left 2019    REMOVAL PAINFUL FIXATION THIRD TOE LEFT FOOT performed by Sandra Gordillo DPM at [facility]       x3    [address] [facility] Left 2021    LEFT CALCANEAL OSTEOTOMY , REMOVAL HARDWARE AND REPAIR OF MALLEOLAR FRACTURE performed by Rosy Johnson DPM at [facility]  2005    left abdomen    HYSTERECTOMY  2009    KNEE ARTHROSCOPY Left     KNEE ARTHROSCOPY Right     x 2 in 2014   [address]     OTHER SURGICAL HISTORY  2009    staples removed from hernia    OTHER SURGICAL HISTORY Left 2020    dr escalera- excision dysplactis lesionx2 left flank and back    OVARY REMOVAL      ROTATOR CUFF REPAIR Right     ROTATOR CUFF REPAIR Right     revision    SHOULDER ARTHROSCOPY Right     SKIN GRAFT Left 2022    SPLIT THICKNESS SKIN GRAFT FROM LEFT THIGH TO LEFT ANKLE,APPLICATION OF WEI WOUND VAC performed by Mounika Spring DPM at [facility] Left 2008    TEMPOROMANDIBULAR JOINT SURGERY Bilateral 1999    TOTAL ANKLE ARTHROPLASTY Left 2021    LEFT ANKLE FUSION TAKEDOWN LEFT TOTAL ANKLE REPLACEMENT performed by Mounika Spring DPM at [address] [place] [address]   [id number] [place] [address] [employer] STIMULATION  2006     battery, removed     [address] [facility]       Social History:    Social History     Tobacco Use    Smoking status: Former Smoker     Packs/day: 1.00     Years: 28.00     Pack years: 28.00     Types: Cigarettes     Start date:      Quit date: 2020     Years since quittin.5    Smokeless tobacco: Never Used    Tobacco comment: smoked on and off for the 28 years cigarettes and cigars   Substance Use Topics    Alcohol use: Not Currently     Comment: sober since                                 Counseling given: Not Answered  Comment: smoked on and off for the 28 years cigarettes and cigars      Vital Signs (Current):   Vitals:    22 1243 22 0549 22 0610   BP:   110/68   Pulse:   69   Resp:   16   Temp:   36.4 °C (97.6 °F)   TempSrc:   Temporal   SpO2:   98%   Weight: 229 lb (103.9 kg) 229 lb (103.9 kg)    Height: 5' 6\" (1.676 m) 5' 6\" (1.676 m)                                               BP Readings from Last 3 Encounters:   22 110/68   22 (!) 106/57   22 137/69       NPO Status: Time of last liquid consumption:                         Time of last solid consumption:                         Date of last liquid consumption: 22                        Date of last solid food consumption: 22    BMI:   Wt Readings from Last 3 Encounters:   22 229 lb (103.9 kg)   22 223 lb (101.2 kg)   21 224 lb (101.6 kg)     Body mass index is 36.96 kg/m².     CBC:   Lab Results   Component Value Date    WBC 8.0 2022    RBC 4.35 2022    RBC 4.67 2021    HGB 12.1 02/22/2022    HCT 39.8 02/22/2022    MCV 91.5 02/22/2022    RDW 14.1 02/22/2022     02/22/2022       CMP:   Lab Results   Component Value Date     02/22/2022    K 4.2 02/22/2022    K 3.6 12/07/2021     02/22/2022    CO2 22 02/22/2022    BUN 8 02/22/2022    CREATININE 0.7 02/22/2022    GFRAA >60 02/22/2022    LABGLOM >60 02/22/2022    GLUCOSE 88 02/22/2022    PROT 6.6 01/18/2022    CALCIUM 8.7 02/22/2022    BILITOT <0.2 01/18/2022    ALKPHOS 109 01/18/2022    AST 10 01/18/2022    ALT 7 01/18/2022       POC Tests: No results for input(s): POCGLU, POCNA, POCK, POCCL, POCBUN, POCHEMO, POCHCT in the last 72 hours.     Coags: No results found for: PROTIME, INR, APTT    HCG (If Applicable): No results found for: PREGTESTUR, PREGSERUM, HCG, HCGQUANT     ABGs: No results found for: PHART, PO2ART, CPM9LJG, XFX8NNI, BEART, Q4BYPYWY     Type & Screen (If Applicable):  No results found for: LABABO, LABRH    Drug/Infectious Status (If Applicable):  No results found for: HIV, HEPCAB    COVID-19 Screening (If Applicable):   Lab Results   Component Value Date    COVID19 Not Detected 12/08/2021           Anesthesia Evaluation     Anesthesia Plan        kiran gilliland, APRN - CRNA   3/4/2022

## 2022-03-04 NOTE — BRIEF OP NOTE
Brief Postoperative Note      Patient: Marleny Lewis  YOB: 1980  MRN: [id number]    Date of Procedure: 3/4/2022    Pre-Op Diagnosis: HAMMER TOE CONTRACTURE PAIN IN TOES 2-5 left FOOT AND mid foot ARTHRITIS & HAV    Post-Op Diagnosis: Same       Procedure(s):  LEFT SECOND THROUGH FIFTH HAMMER TOE CORRECTION  MEDIAL COLUMN FUSION LAPIDUS BUNIONECTOMY TENOTOMY AND CAPSULOTOMY 2,3,4,5 LEFT FOOT    Surgeon(s):  Kylee Regalado DPM    Assistant:  Resident: Erin Harris DPM    Anesthesia: General    Estimated Blood Loss (mL): Minimal less than 20 cc    Complications: None    Specimens:   ID Type Source Tests Collected by Time Destination   A : Left ankle hardware Hardware Hardware [facility] 3/4/2022 0756    B : LEFT MIDFOOT BONE AND CARTILAGE Tissue Tissue SURGICAL PATHOLOGY Kylee Regalado DPM 3/4/2022 0843        Implants:  Implant Name Type Inv.  Item Serial No.  Lot No. LRB No. Used Action   GRAFT BNE SUB 15ML 1.7-10MM CANC CHIP MORSELIZED FRZ DRY - [id number]  GRAFT BNE SUB 15ML 1.7-10MM CANC CHIP MORSELIZED FRZ DRY [id number] [facility] TRANSPLANT [employer]  Left 1 Implanted   [id number] colink afx low-pro cortical screw 3.5x60mm Screw/Plate/Nail/Daniel   [employer]-PMM [id number] Left 1 Implanted   GRAFT BNE SUB 15ML 1.7-10MM CANC CHIP MORSELIZED FRZ DRY - [id number]  GRAFT BNE SUB 15ML 1.7-10MM CANC CHIP MORSELIZED FRZ DRY [id number] [facility] TRANSPLANT [employer]  Left 1 Implanted   [id number] COLINK AFX ONE-THIRD TUBULAR PLATE, 6-HOLE Screw/Plate/Nail/Daniel   [facility] [id number] Left 1 Implanted   [id number] colink afx low-pro cortical screw 3.5X36 MM Screw/Plate/Nail/Daniel   [id number] [facility] [id number] Left 1 Implanted   [id number] colink afx low-pro cortical screw 3.5x32MM Screw/Plate/Nail/Daniel   [id number] [facility] [facility] [id number] Left 1 Implanted   [id number] colink afx locking screw 3.5x14mm Screw/Plate/Nail/Daniel Natalee Jorgensen [facility] [id number] Left 1 Implanted   [id number] colink afx locking screw 3.5x34mm Screw/Plate/Nail/Daniel   Natalee Jorgensen [facility] [id number] Left 1 Implanted   [id number] colink afx locking screw 3.5x40mm Screw/Plate/Nail/Daniel   Natalee Jorgensen [facility] [id number] Left 1 Implanted   [id number] colink afx locking screw 3.5x38mm Screw/Plate/Nail/Daniel   [id number] [place] [facility] [id number] Left 1 Implanted         Drains:   Negative Pressure Wound Therapy Foot Left (Active)       Findings: consistent with diagnosis     Electronically signed by Liudmila Anaya DPM on 3/4/2022 at 9:37 AM

## 2022-03-04 NOTE — ANESTHESIA PRE PROCEDURE
Department of Anesthesiology  Preprocedure Note       Name:  Paola Judge   Age:  42 y.o.  :  1980                                          MRN:  [id number]         Date:  3/4/2022      Surgeon: Yulisa Lorenzo):  Donn Cho, DPPOWER    Procedure: Procedure(s):  LEFT SECOND THROUGH FIFTH HAMMER TOE CORRECTION  MEDIAL COLUMN FUSION POSSIBLE GASTROCNEMIUS RECESSION   ++[id number]++  ++LATEX ALLERGY IODINE ALLERGY++    Medications prior to admission:   Prior to Admission medications    Medication Sig Start Date End Date Taking? Authorizing Provider   rivaroxaban (XARELTO) 10 MG TABS tablet Take 10 mg by mouth daily (with breakfast)   Yes Historical Provider, MD   mupirocin (BACTROBAN) 2 % ointment Apply topically 3 times daily Apply topically 3 times daily. Yes Historical Provider, MD   promethazine (PHENERGAN) 25 MG tablet Take 25 mg by mouth every 6 hours as needed for Nausea   Yes Historical Provider, MD   acetaminophen (TYLENOL) 325 MG tablet Take 650 mg by mouth every 4 hours as needed for Pain   Yes Historical Provider, MD   oxyCODONE HCl (OXY-IR) 10 MG immediate release tablet Take 10 mg by mouth daily. Yes Historical Provider, MD   topiramate (TOPAMAX) 25 MG tablet Take 25 mg by mouth 2 times daily   Yes Historical Provider, MD   polyethylene glycol (MIRALAX) 17 g PACK packet Take 17 g by mouth 2 times daily   Yes Historical Provider, MD   lactulose (CEPHULAC) 10 g packet Take 10 g by mouth daily   Yes Historical Provider, MD   sucralfate (CARAFATE) 1 GM/10ML suspension Take 1 g by mouth 4 times daily As needed   Yes Historical Provider, MD   lamoTRIgine (LAMICTAL) 100 MG tablet take 1 tablet by mouth at bedtime 21  Yes Historical Provider, MD   oxyCODONE ER (XTAMPZA ER) 13.5 MG C12A Take by mouth 2 times daily.     Yes Historical Provider, MD   lidocaine (HM LIDOCAINE PATCH) 4 % external patch Place 1 patch onto the skin daily   Yes Historical Provider, MD   tiZANidine (ZANAFLEX) 2 MG tablet Take 2 mg by mouth 3 times daily   Yes Historical Provider, MD   acarbose (PRECOSE) 100 MG tablet Take 100 mg by mouth 4 times daily  4/25/19  Yes Historical Provider, MD   albuterol sulfate HFA (PROVENTIL HFA) 108 (90 Base) MCG/ACT inhaler Inhale 2 puffs into the lungs every 6 hours as needed  12/11/18  Yes Historical Provider, MD   ALPRAZolam (XANAX) 0.5 MG tablet Take 0.5 mg by mouth 4 times daily as needed. 1/10/19  Yes Historical Provider, MD   benztropine (COGENTIN) 2 MG tablet Take 2 mg by mouth 2 times daily  4/27/19  Yes Historical Provider, MD   cloZAPine (CLOZARIL) 100 MG tablet Take 300 mg by mouth nightly  12/11/18  Yes Historical Provider, MD SUBRAMANIAN VITAMIN B-12 TR 1000 MCG TBCR Take 1 tablet by mouth daily  2/28/19  Yes Historical Provider, MD   EPINEPHrine (Nicci Mindy 2-MARIBEL) 0.15 MG/0.3ML SOAJ Inject into the skin once as needed  3/12/08  Yes Historical Provider, MD   vitamin D (ERGOCALCIFEROL) 96088 units CAPS capsule Take 50,000 Units by mouth every 14 days  5/1/19  Yes Historical Provider, MD   estradiol (VIVELLE) 0.1 MG/24HR Place 1 patch onto the skin Twice a Week Indications: Sun and Wed  5/1/19  Yes Historical Provider, MD   famotidine (PEPCID) 20 MG tablet Take 20 mg by mouth 2 times daily  4/27/19  Yes Historical Provider, MD   levothyroxine (SYNTHROID) 100 MCG tablet Take 100 mcg by mouth Daily  12/12/18  Yes Historical Provider, MD   sertraline (ZOLOFT) 100 MG tablet Take 150 mg by mouth daily  5/7/19  Yes Historical Provider, MD       Current medications:    Current Facility-Administered Medications   Medication Dose Route Frequency Provider Last Rate Last Admin    ceFAZolin (ANCEF) 2000 mg in sterile water 20 mL IV syringe  2,000 mg IntraVENous 30 Min Pre-Op Sbarina Gardner DPM           Allergies:     Allergies   Allergen Reactions    Latex Rash     second degree burn    Fish Allergy Anaphylaxis    Adhesive Tape Other (See Comments)     Redness, blisters    Fentanyl Other (See Comments)     Fentanyl patch caused panic attack    Hydrocodone-Acetaminophen      GI BLEED    Iodine     Ketorolac Tromethamine Nausea Only    Medfix Ez [Wound Dressings]     Molds & Smuts     Other      pollen    Pregabalin Other (See Comments)    Tramadol Nausea Only    Buspirone Rash    Clindamycin Rash    Gabapentin Rash    Metronidazole Rash    Morphine Rash     IV \"a red line started going up towards my heart\"       Problem List:    Patient Active Problem List   Diagnosis Code    Fracture dislocation of toe joint, left, closed, initial encounter S92.912A    Dysplastic Nevus of back D48.5    Ankle arthritis M19.079    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    Status post osteotomy Z98.890    Mild intermittent asthma J45.20    Acquired hypothyroidism E03.9       Past Medical History:        Diagnosis Date    Agoraphobia     Arthritis     osteoarthritis    Asthma     well controlled 1/27/22    Atypical nevi     SEV ATN L flank 2019, Mod to Sev ATN L mid back 2019    Bipolar 1 disorder (HCC)     Borderline personality disorder ([address]     Claustrophobia     Diabetes mellitus ([address]     Difficulty walking     lack of coordination    Dissociative disorder     Fibromyalgia     Foot pain, right     Generalized anxiety disorder     GERD (gastroesophageal reflux disease)     H/O migraine     Hidradenitis suppurativa     usually axillas    Hypoglycemia     IBS (irritable bowel syndrome)     Muscle weakness     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     SCHEDULED   FOR THE SURGERY ON 08/18/21    Nutritional anemia     Osteopenia     Other manic episodes (HCC)     Paranoid schizophrenia (HCC)     Peripheral autonomic neuropathy     PONV (postoperative nausea and vomiting)     Reduced mobility     Seizure (HCC)     if sugar drops    Severe depression ([address]     Thyroid disease        Past Surgical History:        Procedure Laterality Date    ANKLE SURGERY Left 2009    CARPAL TUNNEL RELEASE Left      SECTION  2004    ENDOMETRIAL ABLATION  2009    Novasure    FOOT FRACTURE SURGERY Left 2019    REMOVAL PAINFUL FIXATION THIRD TOE LEFT FOOT performed by Ami Bonilla DPM at [facility]       x3    [address] [facility] Left 2021    LEFT CALCANEAL OSTEOTOMY , REMOVAL HARDWARE AND REPAIR OF MALLEOLAR FRACTURE performed by Misael Richards DPM at [facility] [address]  2005    left abdomen    HYSTERECTOMY  2009    KNEE ARTHROSCOPY Left     KNEE ARTHROSCOPY Right     x 2 in 2014   [address] Left     OTHER SURGICAL HISTORY  2009    staples removed from hernia    OTHER SURGICAL HISTORY Left 2020    dr escalera- excision dysplactis lesionx2 left flank and back    OVARY REMOVAL  2006    ROTATOR CUFF REPAIR Right 1997    ROTATOR CUFF REPAIR Right     revision    SHOULDER ARTHROSCOPY Right     SKIN GRAFT Left 2022    SPLIT THICKNESS SKIN GRAFT FROM LEFT THIGH TO LEFT ANKLE,APPLICATION OF WEI WOUND VAC performed by Misael Richards DPM at [place] [address] Left     TEMPOROMANDIBULAR JOINT SURGERY Bilateral    [address] Left 2021    LEFT ANKLE FUSION TAKEDOWN LEFT TOTAL ANKLE REPLACEMENT performed by Misael Richards DPM at [address] [place] [address] STIMULATION  2006 battery, removed     [address] EXTRACTION         Social History:    Social History     Tobacco Use    Smoking status: Former Smoker     Packs/day: 1.00     Years: 28.00     Pack years: 28.00     Types: Cigarettes     Start date:      Quit date: 2020     Years since quittin.5    Smokeless tobacco: Never Used    Tobacco comment: smoked on and off for the 28 years cigarettes and cigars   Substance Use Topics    Alcohol use: Not Currently     Comment: sober since  Counseling given: Not Answered  Comment: smoked on and off for the 28 years cigarettes and cigars      Vital Signs (Current):   Vitals:    02/23/22 1243 03/04/22 0549 03/04/22 0610   BP:   110/68   Pulse:   69   Resp:   16   Temp:   97.6 °F (36.4 °C)   TempSrc:   Temporal   SpO2:   98%   Weight: 229 lb (103.9 kg) 229 lb (103.9 kg)    Height: 5' 6\" (1.676 m) 5' 6\" (1.676 m)                                               BP Readings from Last 3 Encounters:   03/04/22 110/68   01/31/22 (!) 106/57   01/31/22 137/69       NPO Status:                                                                                 BMI:   Wt Readings from Last 3 Encounters:   03/04/22 229 lb (103.9 kg)   01/31/22 223 lb (101.2 kg)   12/06/21 224 lb (101.6 kg)     Body mass index is 36.96 kg/m². CBC:   Lab Results   Component Value Date    WBC 8.0 02/22/2022    RBC 4.35 02/22/2022    RBC 4.67 08/06/2021    HGB 12.1 02/22/2022    HCT 39.8 02/22/2022    MCV 91.5 02/22/2022    RDW 14.1 02/22/2022     02/22/2022       CMP:   Lab Results   Component Value Date     02/22/2022    K 4.2 02/22/2022    K 3.6 12/07/2021     02/22/2022    CO2 22 02/22/2022    BUN 8 02/22/2022    CREATININE 0.7 02/22/2022    GFRAA >60 02/22/2022    LABGLOM >60 02/22/2022    GLUCOSE 88 02/22/2022    PROT 6.6 01/18/2022    CALCIUM 8.7 02/22/2022    BILITOT <0.2 01/18/2022    ALKPHOS 109 01/18/2022    AST 10 01/18/2022    ALT 7 01/18/2022       POC Tests: No results for input(s): POCGLU, POCNA, POCK, POCCL, POCBUN, POCHEMO, POCHCT in the last 72 hours.     Coags: No results found for: PROTIME, INR, APTT    HCG (If Applicable): No results found for: PREGTESTUR, PREGSERUM, HCG, HCGQUANT     ABGs: No results found for: PHART, PO2ART, CWS6WOM, GMV3OTH, BEART, V0JEATOB     Type & Screen (If Applicable):  No results found for: LABABO, LABRH    Drug/Infectious Status (If Applicable):  No results found for: HIV, HEPCAB    COVID-19 Screening (If Applicable): Lab Results   Component Value Date    COVID19 Not Detected 12/08/2021           Anesthesia Evaluation  Patient summary reviewed and Nursing notes reviewed   history of anesthetic complications: PONV. Airway: Mallampati: III  TM distance: >3 FB   Neck ROM: full  Mouth opening: > = 3 FB Dental:          Pulmonary: breath sounds clear to auscultation  (+) asthma (Mild intermittent: denies recent exacerbations, steroid usage, or increased inhaler requirements):     (-) sleep apnea and not a current smoker (Quit in 2020)                           Cardiovascular:  Exercise tolerance: good (>4 METS),   (+) murmur (Grade I),     (-) past MI, CAD, CABG/stent, dysrhythmias and  angina    ECG reviewed  Rhythm: regular  Rate: normal           Beta Blocker:  Not on Beta Blocker      ROS comment: EKG:  Sinus rhythm  Probable left atrial enlargement  Left ventricular hypertrophy     Neuro/Psych:   (+) seizures (Hypoglycemia induced):, neuromuscular disease (Neuropathy):, headaches: migraine headaches, psychiatric history (Paranoid schizophrenia, Bipolar 1, Boderline personality d/o, agoraphobia, claustrophobia):depression/anxiety    (-) TIA and CVA            ROS comment: Ambulatory dysfunction  Generalized muscle weakness GI/Hepatic/Renal:   (+) GERD: well controlled,      (-) hepatitis and no renal diseaseMorbid obesity: BMI 37 kg/m2. ROS comment: IBS. Endo/Other:    (+) DiabetesType II DM, , hypothyroidism, blood dyscrasia (Xarelto LD 2/26): anticoagulation therapy, arthritis: OA., . Pt had no PAT visit        ROS comment: Narcotic dependent chronic pain (Oxycodone) and fibromyalgia  Osteopenia Abdominal:   (+) obese,           Vascular: negative vascular ROS. - DVT and PE. Other Findings: Tattoos          Anesthesia Plan      general     ASA 3     (LMA okay  20mg Ketamine)  Induction: intravenous. MIPS: Postoperative opioids intended and Prophylactic antiemetics administered.   Anesthetic plan and risks discussed with patient. Plan discussed with CRNA. Alfie Mark DO   3/4/2022      DOS STAFF ADDENDUM:    Pt seen and examined. Chart reviewed including anesthesia, medication, and allergy history. H&P reviewed. Physical exam updated. No interval changes to history or physical examination (unless noted above). NPO status confirmed. Anesthetic plan, risks, benefits, alternatives discussed with patient. Patient verbalized an understanding and agrees to proceed.      Alfie Mark DO  Staff Anesthesiologist  6:55 AM

## 2022-03-04 NOTE — PROGRESS NOTES
CLINICAL PHARMACY NOTE: MEDS TO BEDS    Total # of Prescriptions Filled: 2   The following medications were delivered to the patient:  · Doxycycline hyclate 100 mg  · xarelto 10 mg    Additional Documentation:

## 2022-03-04 NOTE — ANESTHESIA POSTPROCEDURE EVALUATION
Department of Anesthesiology  Postprocedure Note    Patient: Marleny Lewis  MRN: [id number]  YOB: 1980  Date of evaluation: 3/4/2022  Time:  1:35 PM     Procedure Summary     Date: 03/04/22 Room / Location: [facility] OR 09 / [facility]    Anesthesia Start: 5732 Anesthesia Stop: 0585    Procedures:       LEFT SECOND THROUGH FIFTH HAMMER TOE CORRECTION (Left Foot)      MEDIAL COLUMN FUSION LAPIDUS BUNIONECTOMY TENOTOMY AND CAPSULOTOMY 2,3,4,5 LEFT FOOT (Left Foot) Diagnosis: (HAMMER TOE CONTRACTURE PAIN IN TOES FOOT AND ANKLE ARTHRITIS)    Surgeons: Kylee Regalado DPM Responsible Provider: Britany Mckeon DO    Anesthesia Type: Not recorded ASA Status: 3          Anesthesia Type: No value filed. Michaela Phase I: Michaela Score: 10    Michaela Phase II: Michaela Score: 10    Last vitals: Reviewed and per EMR flowsheets.        Anesthesia Post Evaluation    Patient location during evaluation: PACU  Patient participation: complete - patient participated  Level of consciousness: awake and alert  Pain score: 0  Airway patency: patent  Nausea & Vomiting: no nausea and no vomiting  Complications: no  Cardiovascular status: hemodynamically stable  Respiratory status: acceptable  Hydration status: euvolemic    LE allred - TSERING

## 2022-03-04 NOTE — ANESTHESIA POSTPROCEDURE EVALUATION
Department of Anesthesiology  Postprocedure Note    Patient: Noris Banks  MRN: [id number]  YOB: 1980  Date of evaluation: 3/4/2022  Time:  11:21 AM     Procedure Summary     Date: 03/04/22 Room / Location: [facility] / [facility]    Anesthesia Start: 9899 Anesthesia Stop: 1513    Procedures:       LEFT SECOND THROUGH FIFTH HAMMER TOE CORRECTION (Left Foot)      MEDIAL COLUMN FUSION LAPIDUS BUNIONECTOMY TENOTOMY AND CAPSULOTOMY 2,3,4,5 LEFT FOOT (Left Foot) Diagnosis: (HAMMER TOE CONTRACTURE PAIN IN TOES FOOT AND ANKLE ARTHRITIS)    Surgeons: Leroy Epstein DPM Responsible Provider: Marion Rhodes DO    Anesthesia Type: Not recorded ASA Status: 3          Anesthesia Type: No value filed. Michaela Phase I: Michaela Score: 10    Michaela Phase II:      Last vitals: Reviewed and per EMR flowsheets. Anesthesia Post Evaluation    Patient location during evaluation: bedside  Patient participation: complete - patient participated  Level of consciousness: awake  Pain score: 7  Airway patency: patent  Nausea & Vomiting: no vomiting and no nausea  Complications: no  Cardiovascular status: hemodynamically stable  Respiratory status: acceptable  Hydration status: stable  Comments: Seen and examined. Anticipated post operative pain given narcotic dependent chronic pain history. Oxycodone IR ordered in phase II. Questions answered.

## 2022-03-05 NOTE — OP NOTE
[address] [address]                                OPERATIVE REPORT    PATIENT NAME: Anna Persaud                        :        1980  MED REC NO:   [id number]                            ROOM:  ACCOUNT NO:   [de-identified]                           ADMIT DATE: 2022  PROVIDER:     Mounika Spring DPM    DATE OF PROCEDURE:  2022    INDICATION NOTE:  The patient is seen for followup regarding  reconstructive surgery of her left lower extremity. With this  understanding, she agreed to consent, site marking, and perioperative  management understanding the pros, cons, risks, and benefits of the  surgery. With this understanding, she agreed to consent, site marking,  and perioperative management understanding all the risks _____. Her dad  was with her in the preop holding area. He understands the risk as  well. He has been managing her care with a planned staged  reconstructive surgery for her left lower extremity. With this  understanding, they agreed to consent, site marking, and perioperative  management. LOWER EXTREMITY PHYSICAL EXAMINATION:  VASCULAR:  Intact pedal pulses, 2/4 DP, PT. Good capillary refill time. There is mild diffuse postoperative edema on the left lower extremity. NEUROLOGIC:  Reveals intact epicritic sensation, left lower extremity. MUSCULOSKELETAL:  There is increased range of motion at the ankle joint. Hypermobility, instability, and there is pain upon range of motion of  the midfoot. She has hallux valgus deformity that is mild and thus she  has hypermobility of the TMT1. DERMATOLOGICAL:  No open wounds. Skin integrity is intact. ORTHOPEDIC:  There is mild HAV, midfoot osteoarthritis of the left foot  and she has flexion contractures of digit 2 through 5, left, consistent  with hammertoe deformities.     SURGEON:  Mounika Spring DPM    ASSISTANTS:  Dr. Shannan Welch Conner, PGY-3    PREOPERATIVE DIAGNOSES:  1. Midfoot osteoarthritis. 2.  Hallux valgus. 3.  Flexion contracture, PIPJ, second toe. 4.  Flexion contracture, PIPJ, third toe. 5.  Flexion contracture, PIPJ, fourth toe. 6.  Flexion contracture, PIPJ, fifth toe. 7.  Painful hardware, medial ankle. POSTOPERATIVE DIAGNOSES:  1. Midfoot osteoarthritis. 2.  Hallux valgus. 3.  Flexion contracture, PIPJ, second toe. 4.  Flexion contracture, PIPJ, third toe. 5.  Flexion contracture, PIPJ, fourth toe. 6.  Flexion contracture, PIPJ, fifth toe. 7.  Painful hardware, medial ankle. PROCEDURES PERFORMED:  1. Midfoot arthrodesis. 2.  Lapidus bunionectomy. 3.  Removal of painful hardware, medial ankle. 4.  Tenotomy and capsulotomy, PIPJ, second toe. 5.  Tenotomy and capsulotomy, PIPJ, third toe. 6.  Tenotomy and capsulotomy, PIPJ, fourth toe. 7.  Tenotomy and capsulotomy, PIPJ, fifth toe. PROCEDURE IN DETAIL:  The patient was seen in the preop holding area. Appropriate site marking was performed. She agreed to consent, site  marking, and perioperative management. She was brought to the OR,  placed well padded on the OR table, where anesthesia was achieved. Once  the anesthesia was achieved, appropriate timeout was performed and  everybody in the room concurred. PROCEDURE #1:  Midfoot fusion. Attention was directed to the  naviculocuneiform joint. It was noted to be hypermobile and unstable. It was deepened in the same plane using sharp and blunt dissection  avoiding neurovascular structures, carried down to the bone. At this  time, significant amount of time was spent preparing the joint for  fusion. Joint was taken down. It was drilled, curettaged, and power  saw was used to remove the joint. It only exposed cancellous bone. This was temporarily fixated. This was then properly fixated with 3.5  interfrag screw. Attention was directed to the TMT1.     PROCEDURE #2:  Lapidus bunionectomy. Next, the TMT was taking down to the TMT-1 joint. The articular surface and joints were prepped using osteotomes, mallets,  drills, power saw, and curettes. This was temporarily fixated with  K-wires and noted to be in good anatomic alignment. This was fixated  with 3.5 interfrag screw. Next, a 6-hole plate was applied consisting  of locking and nonlocking screws of the medial column. Allogeneic bone  was packed very tightly; bone graft was packed into the TMT1 and also NC  joint. This was fixated, and it was checked under fluoroscopy, noted to  be in good anatomic alignment. At this time, the deep tissues were  closed using 0 Vicryl. Skin was closed using 2-0 nylon. PROCEDURE #3:  Removal of painful hardware,medial ankle. The hardware was removed  from the medial ankle. A stab incision was made. This was done under  fluoroscopy guidance and the screw that was backed out and removed in toto. Skin was closed using 2-0 nylon. PROCEDURE #4:  Tenotomy and capsulotomy of second toe. Next, a 15-blade was used for tenotomy and capsulotomy. This was a complete release to  allow manipulation and extension of the second toe. At this time, the  K-wire was inserted from distal phalanx, middle phalanx, and proximal  phalanx under fluoroscopy guidance. PROCEDURE #5:  Tenotomy and capsulotomy, third toe. At this point in time, a flexor tenotomy and capsulotomy was performed on the third toe. This was released. The toe was completely manipulated. It was reduced  and K-wire was inserted from distal phalanx, middle phalanx, and  proximal phalanx, noted to be in good anatomic alignment under  fluoroscopy. PROCEDURE #6:  Tenotomy and capsulotomy, fourth toe. A 15-blade was used. A complete capsulotomy and tenotomy was performed. This was  essentially from distal phalanx, middle phalanx, and proximal phalanx of  the fourth toe under fluoroscopy guidance.     PROCEDURE #7:  Tenotomy and capsulotomy, fifth toe. A 15 blade was used. A flexor tenotomy and capsulotomy was performed. Manipulation  reduction was performed and at this point in time, a K-wire was used  from the distal phalanx, middle phalanx, and proximal phalanx  stabilizing the fifth toe. With this in good anatomic alignment, the  surgical wounds were dressed with Adaptic, 4x4s, Kerry in a sterile  compressive fashion. The tourniquet was released. Normal vascular  function was noted to return to all digits of the left lower extremity. She was anesthetized with 30 mL of 0.5% plain Marcaine about the  surgical site. The surgical wounds were dressed with Abeatamie Cesilia in a  compressive fashion. Univalve B-K cast was applied with a posterior  splint. She tolerated the procedure and anesthesia well and left the OR  with vital signs stable and neurovascular status intact. An estimation of less than 20 cc of blood was lost during the surgery.       Mary Fraga DPM    D: 03/04/2022 10:40:29       T: 03/04/2022 13:25:28     [email or web address]  Job#: [id number]     Doc#: [id number]

## 2022-03-07 LAB
FUNGUS (MYCOLOGY) CULTURE: NORMAL
FUNGUS STAIN: NORMAL

## 2022-03-22 LAB
AFB CULTURE (MYCOBACTERIA): NORMAL
AFB SMEAR: NORMAL

## 2024-07-03 ENCOUNTER — AMBULATORY SURGICAL CENTER (OUTPATIENT)
Dept: URBAN - METROPOLITAN AREA SURGERY 6 | Facility: SURGERY | Age: 44
End: 2024-07-03
Payer: MEDICARE

## 2024-07-03 VITALS
RESPIRATION RATE: 14 BRPM | DIASTOLIC BLOOD PRESSURE: 64 MMHG | SYSTOLIC BLOOD PRESSURE: 127 MMHG | HEART RATE: 64 BPM | OXYGEN SATURATION: 95 % | SYSTOLIC BLOOD PRESSURE: 123 MMHG | SYSTOLIC BLOOD PRESSURE: 116 MMHG | HEART RATE: 60 BPM | HEART RATE: 65 BPM | DIASTOLIC BLOOD PRESSURE: 71 MMHG | SYSTOLIC BLOOD PRESSURE: 120 MMHG | DIASTOLIC BLOOD PRESSURE: 86 MMHG | HEART RATE: 62 BPM | WEIGHT: 237 LBS | HEART RATE: 57 BPM | HEART RATE: 57 BPM | SYSTOLIC BLOOD PRESSURE: 123 MMHG | SYSTOLIC BLOOD PRESSURE: 123 MMHG | SYSTOLIC BLOOD PRESSURE: 116 MMHG | OXYGEN SATURATION: 96 % | WEIGHT: 237 LBS | DIASTOLIC BLOOD PRESSURE: 81 MMHG | DIASTOLIC BLOOD PRESSURE: 86 MMHG | OXYGEN SATURATION: 96 % | SYSTOLIC BLOOD PRESSURE: 120 MMHG | TEMPERATURE: 95.7 F | DIASTOLIC BLOOD PRESSURE: 64 MMHG | HEART RATE: 64 BPM | SYSTOLIC BLOOD PRESSURE: 127 MMHG | DIASTOLIC BLOOD PRESSURE: 81 MMHG | DIASTOLIC BLOOD PRESSURE: 71 MMHG | HEART RATE: 65 BPM | OXYGEN SATURATION: 96 % | HEIGHT: 66 IN | HEART RATE: 60 BPM | HEART RATE: 65 BPM | SYSTOLIC BLOOD PRESSURE: 120 MMHG | OXYGEN SATURATION: 95 % | RESPIRATION RATE: 18 BRPM | TEMPERATURE: 95.7 F | RESPIRATION RATE: 18 BRPM | SYSTOLIC BLOOD PRESSURE: 116 MMHG | RESPIRATION RATE: 13 BRPM | RESPIRATION RATE: 14 BRPM | OXYGEN SATURATION: 97 % | SYSTOLIC BLOOD PRESSURE: 127 MMHG | HEART RATE: 62 BPM | HEART RATE: 64 BPM | TEMPERATURE: 95.7 F | RESPIRATION RATE: 14 BRPM | OXYGEN SATURATION: 95 % | RESPIRATION RATE: 13 BRPM | RESPIRATION RATE: 13 BRPM | RESPIRATION RATE: 18 BRPM | OXYGEN SATURATION: 97 % | DIASTOLIC BLOOD PRESSURE: 81 MMHG | HEIGHT: 66 IN | HEIGHT: 66 IN | OXYGEN SATURATION: 97 % | WEIGHT: 237 LBS | DIASTOLIC BLOOD PRESSURE: 71 MMHG | HEART RATE: 62 BPM | DIASTOLIC BLOOD PRESSURE: 86 MMHG | DIASTOLIC BLOOD PRESSURE: 64 MMHG | HEART RATE: 60 BPM | HEART RATE: 57 BPM

## 2024-07-03 DIAGNOSIS — Z12.11 ENCOUNTER FOR SCREENING FOR MALIGNANT NEOPLASM OF COLON: ICD-10-CM

## 2024-07-03 DIAGNOSIS — Z53.8 PROCEDURE AND TREATMENT NOT CARRIED OUT FOR OTHER REASONS: ICD-10-CM

## 2024-07-03 PROCEDURE — G0121 COLON CA SCRN NOT HI RSK IND: HCPCS | Mod: 53 | Performed by: INTERNAL MEDICINE

## 2024-07-03 PROCEDURE — G0121 COLON CA SCRN NOT HI RSK IND: HCPCS | Mod: 74 | Performed by: INTERNAL MEDICINE

## 2024-07-11 ENCOUNTER — OFFICE (OUTPATIENT)
Dept: URBAN - METROPOLITAN AREA CLINIC 15 | Facility: CLINIC | Age: 44
End: 2024-07-11

## 2024-07-11 VITALS
HEART RATE: 68 BPM | TEMPERATURE: 98.2 F | DIASTOLIC BLOOD PRESSURE: 62 MMHG | HEIGHT: 66 IN | SYSTOLIC BLOOD PRESSURE: 112 MMHG | OXYGEN SATURATION: 99 % | WEIGHT: 243 LBS

## 2024-07-11 DIAGNOSIS — K22.70 BARRETT'S ESOPHAGUS WITHOUT DYSPLASIA: ICD-10-CM

## 2024-07-11 DIAGNOSIS — K58.1 IRRITABLE BOWEL SYNDROME WITH CONSTIPATION: ICD-10-CM

## 2024-07-11 PROCEDURE — 99214 OFFICE O/P EST MOD 30 MIN: CPT | Performed by: CLINICAL NURSE SPECIALIST

## 2024-07-11 RX ORDER — OMEPRAZOLE 20 MG/1
TABLET, DELAYED RELEASE ORAL
Qty: 90 | Refills: 3 | Status: ACTIVE
Start: 2023-12-14 | End: 2024-07-17

## 2024-07-11 RX ORDER — LINACLOTIDE 145 UG/1
145 CAPSULE, GELATIN COATED ORAL
Qty: 90 | Refills: 3 | Status: ACTIVE
Start: 2024-07-11

## 2024-07-11 RX ORDER — PANTOPRAZOLE SODIUM 20 MG/1
TABLET, DELAYED RELEASE ORAL
Qty: 0 | Refills: 0 | Status: COMPLETED
End: 2024-07-11

## 2024-07-18 RX ORDER — MONTELUKAST SODIUM 10 MG/1
10 TABLET ORAL DAILY
COMMUNITY

## 2024-07-18 RX ORDER — M-VIT,TX,IRON,MINS/CALC/FOLIC 27MG-0.4MG
1 TABLET ORAL DAILY
COMMUNITY

## 2024-07-18 RX ORDER — NITROGLYCERIN 0.3 MG/1
0.3 TABLET SUBLINGUAL EVERY 5 MIN PRN
COMMUNITY

## 2024-07-18 RX ORDER — OMEPRAZOLE 20 MG/1
20 CAPSULE, DELAYED RELEASE ORAL DAILY
COMMUNITY

## 2024-07-18 RX ORDER — ERENUMAB-AOOE 70 MG/ML
70 INJECTION SUBCUTANEOUS
COMMUNITY

## 2024-07-18 RX ORDER — FLUTICASONE PROPIONATE AND SALMETEROL 100; 50 UG/1; UG/1
1 POWDER RESPIRATORY (INHALATION) EVERY 12 HOURS
COMMUNITY

## 2024-07-18 RX ORDER — DOCUSATE SODIUM 100 MG/1
100 CAPSULE, LIQUID FILLED ORAL 2 TIMES DAILY
COMMUNITY

## 2024-07-18 RX ORDER — SEMAGLUTIDE 0.68 MG/ML
INJECTION, SOLUTION SUBCUTANEOUS WEEKLY
COMMUNITY

## 2024-07-18 RX ORDER — ONDANSETRON 4 MG/1
4 TABLET, FILM COATED ORAL EVERY 8 HOURS PRN
COMMUNITY

## 2024-07-18 RX ORDER — ASPIRIN 81 MG/1
81 TABLET ORAL DAILY
COMMUNITY

## 2024-07-18 RX ORDER — FUROSEMIDE 20 MG/1
20 TABLET ORAL 2 TIMES DAILY
COMMUNITY

## 2024-07-19 ENCOUNTER — ANESTHESIA EVENT (OUTPATIENT)
Dept: OPERATING ROOM | Age: 44
End: 2024-07-19
Payer: MEDICARE

## 2024-07-22 ENCOUNTER — HOSPITAL ENCOUNTER (OUTPATIENT)
Age: 44
Setting detail: OBSERVATION
Discharge: NURSING, REHAB OR LONG-TERM CARE | End: 2024-07-29
Attending: PODIATRIST | Admitting: STUDENT IN AN ORGANIZED HEALTH CARE EDUCATION/TRAINING PROGRAM
Payer: MEDICARE

## 2024-07-22 ENCOUNTER — ANESTHESIA (OUTPATIENT)
Dept: OPERATING ROOM | Age: 44
End: 2024-07-22
Payer: MEDICARE

## 2024-07-22 ENCOUNTER — APPOINTMENT (OUTPATIENT)
Dept: GENERAL RADIOLOGY | Age: 44
End: 2024-07-22
Attending: PODIATRIST
Payer: MEDICARE

## 2024-07-22 DIAGNOSIS — M20.11 ACQUIRED HALLUX VALGUS OF RIGHT FOOT: ICD-10-CM

## 2024-07-22 DIAGNOSIS — M21.6X1 ACQUIRED EXTERNAL ROTATION OF FOOT, RIGHT: ICD-10-CM

## 2024-07-22 DIAGNOSIS — S86.311S: ICD-10-CM

## 2024-07-22 DIAGNOSIS — S86.111A GASTROCNEMIUS MUSCLE RUPTURE, RIGHT, INITIAL ENCOUNTER: Primary | ICD-10-CM

## 2024-07-22 DIAGNOSIS — M19.071 ARTHRITIS OF RIGHT ANKLE: ICD-10-CM

## 2024-07-22 DIAGNOSIS — F41.9 ANXIETY: ICD-10-CM

## 2024-07-22 PROBLEM — R26.2 AMBULATORY DYSFUNCTION: Status: ACTIVE | Noted: 2024-07-22

## 2024-07-22 LAB
GLUCOSE BLD-MCNC: 112 MG/DL (ref 74–99)
GLUCOSE BLD-MCNC: 90 MG/DL (ref 74–99)
GLUCOSE BLD-MCNC: 94 MG/DL (ref 74–99)

## 2024-07-22 PROCEDURE — G0378 HOSPITAL OBSERVATION PER HR: HCPCS

## 2024-07-22 PROCEDURE — 3700000001 HC ADD 15 MINUTES (ANESTHESIA): Performed by: PODIATRIST

## 2024-07-22 PROCEDURE — 97165 OT EVAL LOW COMPLEX 30 MIN: CPT

## 2024-07-22 PROCEDURE — 97161 PT EVAL LOW COMPLEX 20 MIN: CPT

## 2024-07-22 PROCEDURE — 2720000001 HC MISC SURG SUPPLY STERILE $51-500: Performed by: PODIATRIST

## 2024-07-22 PROCEDURE — 6370000000 HC RX 637 (ALT 250 FOR IP): Performed by: ANESTHESIOLOGY

## 2024-07-22 PROCEDURE — 3600000014 HC SURGERY LEVEL 4 ADDTL 15MIN: Performed by: PODIATRIST

## 2024-07-22 PROCEDURE — 6370000000 HC RX 637 (ALT 250 FOR IP): Performed by: STUDENT IN AN ORGANIZED HEALTH CARE EDUCATION/TRAINING PROGRAM

## 2024-07-22 PROCEDURE — 6360000002 HC RX W HCPCS: Performed by: INTERNAL MEDICINE

## 2024-07-22 PROCEDURE — 2720000000 HC MISC SURG SUPPLY STERILE $0-50: Performed by: PODIATRIST

## 2024-07-22 PROCEDURE — 6370000000 HC RX 637 (ALT 250 FOR IP): Performed by: INTERNAL MEDICINE

## 2024-07-22 PROCEDURE — 88304 TISSUE EXAM BY PATHOLOGIST: CPT

## 2024-07-22 PROCEDURE — 2720000010 HC SURG SUPPLY STERILE: Performed by: PODIATRIST

## 2024-07-22 PROCEDURE — 2709999900 HC NON-CHARGEABLE SUPPLY: Performed by: PODIATRIST

## 2024-07-22 PROCEDURE — C1713 ANCHOR/SCREW BN/BN,TIS/BN: HCPCS | Performed by: PODIATRIST

## 2024-07-22 PROCEDURE — 96372 THER/PROPH/DIAG INJ SC/IM: CPT

## 2024-07-22 PROCEDURE — 3700000000 HC ANESTHESIA ATTENDED CARE: Performed by: PODIATRIST

## 2024-07-22 PROCEDURE — 2580000003 HC RX 258

## 2024-07-22 PROCEDURE — 88311 DECALCIFY TISSUE: CPT

## 2024-07-22 PROCEDURE — 99223 1ST HOSP IP/OBS HIGH 75: CPT | Performed by: STUDENT IN AN ORGANIZED HEALTH CARE EDUCATION/TRAINING PROGRAM

## 2024-07-22 PROCEDURE — 6360000002 HC RX W HCPCS: Performed by: ANESTHESIOLOGY

## 2024-07-22 PROCEDURE — 64447 NJX AA&/STRD FEMORAL NRV IMG: CPT | Performed by: ANESTHESIOLOGY

## 2024-07-22 PROCEDURE — 7100000000 HC PACU RECOVERY - FIRST 15 MIN: Performed by: PODIATRIST

## 2024-07-22 PROCEDURE — 2580000003 HC RX 258: Performed by: NURSE ANESTHETIST, CERTIFIED REGISTERED

## 2024-07-22 PROCEDURE — 82962 GLUCOSE BLOOD TEST: CPT

## 2024-07-22 PROCEDURE — 6360000002 HC RX W HCPCS

## 2024-07-22 PROCEDURE — 2500000003 HC RX 250 WO HCPCS: Performed by: NURSE ANESTHETIST, CERTIFIED REGISTERED

## 2024-07-22 PROCEDURE — 7100000001 HC PACU RECOVERY - ADDTL 15 MIN: Performed by: PODIATRIST

## 2024-07-22 PROCEDURE — 6360000002 HC RX W HCPCS: Performed by: NURSE ANESTHETIST, CERTIFIED REGISTERED

## 2024-07-22 PROCEDURE — 7100000010 HC PHASE II RECOVERY - FIRST 15 MIN: Performed by: PODIATRIST

## 2024-07-22 PROCEDURE — 3600000004 HC SURGERY LEVEL 4 BASE: Performed by: PODIATRIST

## 2024-07-22 PROCEDURE — 64445 NJX AA&/STRD SCIATIC NRV IMG: CPT | Performed by: ANESTHESIOLOGY

## 2024-07-22 PROCEDURE — 7100000011 HC PHASE II RECOVERY - ADDTL 15 MIN: Performed by: PODIATRIST

## 2024-07-22 DEVICE — SCREW, FT, Ø6.7X60.0 MM
Type: IMPLANTABLE DEVICE | Site: FOOT | Status: FUNCTIONAL
Brand: COLAG™

## 2024-07-22 DEVICE — COLINK AFX ONE-THIRD TUBULAR PLATE, 7-HOLE
Type: IMPLANTABLE DEVICE | Site: FOOT | Status: FUNCTIONAL
Brand: COLINK

## 2024-07-22 DEVICE — GRAFT BNE SUB 15ML 1.7-10MM CANC CHIP MORSELIZED FRZ DRY: Type: IMPLANTABLE DEVICE | Site: FOOT | Status: FUNCTIONAL

## 2024-07-22 DEVICE — LOCKING SCREW, Ø3.5X40MM
Type: IMPLANTABLE DEVICE | Site: FOOT | Status: FUNCTIONAL
Brand: COLINK™ AFX

## 2024-07-22 DEVICE — SCREW, Ø6.7X40.0 MM
Type: IMPLANTABLE DEVICE | Site: FOOT | Status: FUNCTIONAL
Brand: COLAG™

## 2024-07-22 DEVICE — LOCKING SCREW, Ø3.5X16MM
Type: IMPLANTABLE DEVICE | Site: FOOT | Status: FUNCTIONAL
Brand: COLINK™ AFX

## 2024-07-22 DEVICE — GRAFT BNE L50MM W20-24MM THICKNESS 5-30MM ILIUM TRICORT: Type: IMPLANTABLE DEVICE | Site: FOOT | Status: FUNCTIONAL

## 2024-07-22 DEVICE — LOW-PRO CORTICAL SCREW, Ø3.5X38MM
Type: IMPLANTABLE DEVICE | Site: FOOT | Status: FUNCTIONAL
Brand: COLINK™ AFX

## 2024-07-22 RX ORDER — SODIUM CHLORIDE 9 MG/ML
INJECTION, SOLUTION INTRAVENOUS PRN
Status: DISCONTINUED | OUTPATIENT
Start: 2024-07-22 | End: 2024-07-29 | Stop reason: HOSPADM

## 2024-07-22 RX ORDER — ROCURONIUM BROMIDE 10 MG/ML
INJECTION, SOLUTION INTRAVENOUS PRN
Status: DISCONTINUED | OUTPATIENT
Start: 2024-07-22 | End: 2024-07-22 | Stop reason: SDUPTHER

## 2024-07-22 RX ORDER — ACETAMINOPHEN 325 MG/1
650 TABLET ORAL EVERY 6 HOURS PRN
Status: DISCONTINUED | OUTPATIENT
Start: 2024-07-22 | End: 2024-07-29 | Stop reason: HOSPADM

## 2024-07-22 RX ORDER — FENTANYL CITRATE 50 UG/ML
INJECTION, SOLUTION INTRAMUSCULAR; INTRAVENOUS
Status: COMPLETED
Start: 2024-07-22 | End: 2024-07-22

## 2024-07-22 RX ORDER — DEXTROSE MONOHYDRATE 100 MG/ML
INJECTION, SOLUTION INTRAVENOUS CONTINUOUS PRN
Status: DISCONTINUED | OUTPATIENT
Start: 2024-07-22 | End: 2024-07-29 | Stop reason: HOSPADM

## 2024-07-22 RX ORDER — FUROSEMIDE 20 MG/1
20 TABLET ORAL 2 TIMES DAILY
Status: DISCONTINUED | OUTPATIENT
Start: 2024-07-22 | End: 2024-07-29 | Stop reason: HOSPADM

## 2024-07-22 RX ORDER — ONDANSETRON 2 MG/ML
INJECTION INTRAMUSCULAR; INTRAVENOUS PRN
Status: DISCONTINUED | OUTPATIENT
Start: 2024-07-22 | End: 2024-07-22 | Stop reason: SDUPTHER

## 2024-07-22 RX ORDER — SODIUM CHLORIDE 9 MG/ML
INJECTION, SOLUTION INTRAVENOUS CONTINUOUS PRN
Status: DISCONTINUED | OUTPATIENT
Start: 2024-07-22 | End: 2024-07-22 | Stop reason: SDUPTHER

## 2024-07-22 RX ORDER — PROPOFOL 10 MG/ML
INJECTION, EMULSION INTRAVENOUS PRN
Status: DISCONTINUED | OUTPATIENT
Start: 2024-07-22 | End: 2024-07-22 | Stop reason: SDUPTHER

## 2024-07-22 RX ORDER — NEOSTIGMINE METHYLSULFATE 1 MG/ML
INJECTION, SOLUTION INTRAVENOUS PRN
Status: DISCONTINUED | OUTPATIENT
Start: 2024-07-22 | End: 2024-07-22 | Stop reason: SDUPTHER

## 2024-07-22 RX ORDER — ROPIVACAINE HYDROCHLORIDE 5 MG/ML
INJECTION, SOLUTION EPIDURAL; INFILTRATION; PERINEURAL
Status: COMPLETED | OUTPATIENT
Start: 2024-07-22 | End: 2024-07-22

## 2024-07-22 RX ORDER — ONDANSETRON 2 MG/ML
4 INJECTION INTRAMUSCULAR; INTRAVENOUS
Status: COMPLETED | OUTPATIENT
Start: 2024-07-22 | End: 2024-07-22

## 2024-07-22 RX ORDER — MIDAZOLAM HYDROCHLORIDE 1 MG/ML
INJECTION INTRAMUSCULAR; INTRAVENOUS
Status: COMPLETED
Start: 2024-07-22 | End: 2024-07-22

## 2024-07-22 RX ORDER — PROMETHAZINE HYDROCHLORIDE 25 MG/1
12.5 TABLET ORAL EVERY 6 HOURS PRN
Status: DISCONTINUED | OUTPATIENT
Start: 2024-07-22 | End: 2024-07-29 | Stop reason: HOSPADM

## 2024-07-22 RX ORDER — LEVOTHYROXINE SODIUM 0.12 MG/1
125 TABLET ORAL DAILY
Status: DISCONTINUED | OUTPATIENT
Start: 2024-07-22 | End: 2024-07-22 | Stop reason: SDUPTHER

## 2024-07-22 RX ORDER — KETOROLAC TROMETHAMINE 30 MG/ML
INJECTION, SOLUTION INTRAMUSCULAR; INTRAVENOUS PRN
Status: DISCONTINUED | OUTPATIENT
Start: 2024-07-22 | End: 2024-07-22 | Stop reason: SDUPTHER

## 2024-07-22 RX ORDER — ASPIRIN 81 MG/1
81 TABLET ORAL DAILY
Status: DISCONTINUED | OUTPATIENT
Start: 2024-07-22 | End: 2024-07-29 | Stop reason: HOSPADM

## 2024-07-22 RX ORDER — GLYCOPYRROLATE 0.2 MG/ML
INJECTION INTRAMUSCULAR; INTRAVENOUS PRN
Status: DISCONTINUED | OUTPATIENT
Start: 2024-07-22 | End: 2024-07-22 | Stop reason: SDUPTHER

## 2024-07-22 RX ORDER — ONDANSETRON 2 MG/ML
4 INJECTION INTRAMUSCULAR; INTRAVENOUS EVERY 6 HOURS PRN
Status: DISCONTINUED | OUTPATIENT
Start: 2024-07-22 | End: 2024-07-29 | Stop reason: HOSPADM

## 2024-07-22 RX ORDER — MONTELUKAST SODIUM 10 MG/1
10 TABLET ORAL DAILY
Status: DISCONTINUED | OUTPATIENT
Start: 2024-07-23 | End: 2024-07-29 | Stop reason: HOSPADM

## 2024-07-22 RX ORDER — HYDROMORPHONE HYDROCHLORIDE 2 MG/ML
INJECTION, SOLUTION INTRAMUSCULAR; INTRAVENOUS; SUBCUTANEOUS PRN
Status: DISCONTINUED | OUTPATIENT
Start: 2024-07-22 | End: 2024-07-22 | Stop reason: SDUPTHER

## 2024-07-22 RX ORDER — RIVAROXABAN 10 MG/1
10 TABLET, FILM COATED ORAL
Qty: 30 TABLET | Refills: 1 | Status: SHIPPED | OUTPATIENT
Start: 2024-07-22

## 2024-07-22 RX ORDER — POTASSIUM CHLORIDE 20 MEQ/1
40 TABLET, EXTENDED RELEASE ORAL PRN
Status: DISCONTINUED | OUTPATIENT
Start: 2024-07-22 | End: 2024-07-29 | Stop reason: HOSPADM

## 2024-07-22 RX ORDER — MIDAZOLAM HYDROCHLORIDE 2 MG/2ML
2 INJECTION, SOLUTION INTRAMUSCULAR; INTRAVENOUS
Status: DISCONTINUED | OUTPATIENT
Start: 2024-07-22 | End: 2024-07-22

## 2024-07-22 RX ORDER — IPRATROPIUM BROMIDE AND ALBUTEROL SULFATE 2.5; .5 MG/3ML; MG/3ML
1 SOLUTION RESPIRATORY (INHALATION)
Status: DISCONTINUED | OUTPATIENT
Start: 2024-07-22 | End: 2024-07-22

## 2024-07-22 RX ORDER — MAGNESIUM SULFATE IN WATER 40 MG/ML
2000 INJECTION, SOLUTION INTRAVENOUS PRN
Status: DISCONTINUED | OUTPATIENT
Start: 2024-07-22 | End: 2024-07-29 | Stop reason: HOSPADM

## 2024-07-22 RX ORDER — MIDAZOLAM HYDROCHLORIDE 2 MG/2ML
1 INJECTION, SOLUTION INTRAMUSCULAR; INTRAVENOUS EVERY 5 MIN PRN
Status: DISCONTINUED | OUTPATIENT
Start: 2024-07-22 | End: 2024-07-22

## 2024-07-22 RX ORDER — SODIUM CHLORIDE 0.9 % (FLUSH) 0.9 %
5-40 SYRINGE (ML) INJECTION PRN
Status: DISCONTINUED | OUTPATIENT
Start: 2024-07-22 | End: 2024-07-22

## 2024-07-22 RX ORDER — FAMOTIDINE 20 MG/1
40 TABLET, FILM COATED ORAL DAILY
Status: DISCONTINUED | OUTPATIENT
Start: 2024-07-23 | End: 2024-07-29 | Stop reason: HOSPADM

## 2024-07-22 RX ORDER — DOCUSATE SODIUM 100 MG/1
100 CAPSULE, LIQUID FILLED ORAL 2 TIMES DAILY
Status: DISCONTINUED | OUTPATIENT
Start: 2024-07-22 | End: 2024-07-29 | Stop reason: HOSPADM

## 2024-07-22 RX ORDER — OXYCODONE HYDROCHLORIDE 5 MG/1
10 TABLET ORAL ONCE
Status: COMPLETED | OUTPATIENT
Start: 2024-07-22 | End: 2024-07-22

## 2024-07-22 RX ORDER — DIPHENHYDRAMINE HYDROCHLORIDE 50 MG/ML
INJECTION INTRAMUSCULAR; INTRAVENOUS PRN
Status: DISCONTINUED | OUTPATIENT
Start: 2024-07-22 | End: 2024-07-22 | Stop reason: SDUPTHER

## 2024-07-22 RX ORDER — ROPIVACAINE HYDROCHLORIDE 5 MG/ML
INJECTION, SOLUTION EPIDURAL; INFILTRATION; PERINEURAL
Status: DISPENSED
Start: 2024-07-22 | End: 2024-07-22

## 2024-07-22 RX ORDER — CLOZAPINE 100 MG/1
100 TABLET ORAL NIGHTLY
Status: DISCONTINUED | OUTPATIENT
Start: 2024-07-22 | End: 2024-07-29 | Stop reason: HOSPADM

## 2024-07-22 RX ORDER — MEPERIDINE HYDROCHLORIDE 25 MG/ML
12.5 INJECTION INTRAMUSCULAR; INTRAVENOUS; SUBCUTANEOUS EVERY 5 MIN PRN
Status: DISCONTINUED | OUTPATIENT
Start: 2024-07-22 | End: 2024-07-22

## 2024-07-22 RX ORDER — SCOLOPAMINE TRANSDERMAL SYSTEM 1 MG/1
1 PATCH, EXTENDED RELEASE TRANSDERMAL ONCE
Status: COMPLETED | OUTPATIENT
Start: 2024-07-22 | End: 2024-07-25

## 2024-07-22 RX ORDER — OXYCODONE HYDROCHLORIDE 5 MG/1
5 TABLET ORAL EVERY 4 HOURS PRN
Status: DISCONTINUED | OUTPATIENT
Start: 2024-07-22 | End: 2024-07-29 | Stop reason: HOSPADM

## 2024-07-22 RX ORDER — MIDAZOLAM HYDROCHLORIDE 1 MG/ML
INJECTION INTRAMUSCULAR; INTRAVENOUS PRN
Status: DISCONTINUED | OUTPATIENT
Start: 2024-07-22 | End: 2024-07-22 | Stop reason: SDUPTHER

## 2024-07-22 RX ORDER — GLUCAGON 1 MG/ML
1 KIT INJECTION PRN
Status: DISCONTINUED | OUTPATIENT
Start: 2024-07-22 | End: 2024-07-29 | Stop reason: HOSPADM

## 2024-07-22 RX ORDER — POTASSIUM CHLORIDE 7.45 MG/ML
10 INJECTION INTRAVENOUS PRN
Status: DISCONTINUED | OUTPATIENT
Start: 2024-07-22 | End: 2024-07-29 | Stop reason: HOSPADM

## 2024-07-22 RX ORDER — ROPIVACAINE HYDROCHLORIDE 5 MG/ML
30 INJECTION, SOLUTION EPIDURAL; INFILTRATION; PERINEURAL
Status: DISCONTINUED | OUTPATIENT
Start: 2024-07-22 | End: 2024-07-22

## 2024-07-22 RX ORDER — NALOXONE HYDROCHLORIDE 0.4 MG/ML
INJECTION, SOLUTION INTRAMUSCULAR; INTRAVENOUS; SUBCUTANEOUS PRN
Status: DISCONTINUED | OUTPATIENT
Start: 2024-07-22 | End: 2024-07-22 | Stop reason: HOSPADM

## 2024-07-22 RX ORDER — ONDANSETRON 4 MG/1
4 TABLET, ORALLY DISINTEGRATING ORAL EVERY 8 HOURS PRN
Status: DISCONTINUED | OUTPATIENT
Start: 2024-07-22 | End: 2024-07-23 | Stop reason: ALTCHOICE

## 2024-07-22 RX ORDER — SODIUM CHLORIDE 0.9 % (FLUSH) 0.9 %
5-40 SYRINGE (ML) INJECTION PRN
Status: DISCONTINUED | OUTPATIENT
Start: 2024-07-22 | End: 2024-07-29 | Stop reason: HOSPADM

## 2024-07-22 RX ORDER — LIDOCAINE HYDROCHLORIDE 20 MG/ML
INJECTION, SOLUTION EPIDURAL; INFILTRATION; INTRACAUDAL; PERINEURAL PRN
Status: DISCONTINUED | OUTPATIENT
Start: 2024-07-22 | End: 2024-07-22 | Stop reason: SDUPTHER

## 2024-07-22 RX ORDER — ACETAMINOPHEN 650 MG
TABLET, EXTENDED RELEASE ORAL PRN
Status: DISCONTINUED | OUTPATIENT
Start: 2024-07-22 | End: 2024-07-22 | Stop reason: ALTCHOICE

## 2024-07-22 RX ORDER — SODIUM CHLORIDE 9 MG/ML
INJECTION, SOLUTION INTRAVENOUS PRN
Status: DISCONTINUED | OUTPATIENT
Start: 2024-07-22 | End: 2024-07-22 | Stop reason: HOSPADM

## 2024-07-22 RX ORDER — DOXYCYCLINE HYCLATE 100 MG
100 TABLET ORAL 2 TIMES DAILY
Qty: 20 TABLET | Refills: 0 | Status: SHIPPED | OUTPATIENT
Start: 2024-07-22 | End: 2024-08-01

## 2024-07-22 RX ORDER — ALPRAZOLAM 0.25 MG/1
0.75 TABLET ORAL 4 TIMES DAILY
Status: DISCONTINUED | OUTPATIENT
Start: 2024-07-22 | End: 2024-07-29 | Stop reason: HOSPADM

## 2024-07-22 RX ORDER — ENOXAPARIN SODIUM 100 MG/ML
30 INJECTION SUBCUTANEOUS 2 TIMES DAILY
Status: DISCONTINUED | OUTPATIENT
Start: 2024-07-22 | End: 2024-07-29 | Stop reason: HOSPADM

## 2024-07-22 RX ORDER — OXYCODONE HYDROCHLORIDE 5 MG/1
10 TABLET ORAL EVERY 4 HOURS PRN
Status: DISCONTINUED | OUTPATIENT
Start: 2024-07-22 | End: 2024-07-29 | Stop reason: HOSPADM

## 2024-07-22 RX ORDER — INSULIN LISPRO 100 [IU]/ML
0-4 INJECTION, SOLUTION INTRAVENOUS; SUBCUTANEOUS NIGHTLY
Status: DISCONTINUED | OUTPATIENT
Start: 2024-07-22 | End: 2024-07-29 | Stop reason: HOSPADM

## 2024-07-22 RX ORDER — LABETALOL HYDROCHLORIDE 5 MG/ML
10 INJECTION, SOLUTION INTRAVENOUS
Status: DISCONTINUED | OUTPATIENT
Start: 2024-07-22 | End: 2024-07-22

## 2024-07-22 RX ORDER — ONDANSETRON 2 MG/ML
4 INJECTION INTRAMUSCULAR; INTRAVENOUS EVERY 6 HOURS PRN
Status: DISCONTINUED | OUTPATIENT
Start: 2024-07-22 | End: 2024-07-23 | Stop reason: ALTCHOICE

## 2024-07-22 RX ORDER — DEXAMETHASONE SODIUM PHOSPHATE 4 MG/ML
INJECTION, SOLUTION INTRA-ARTICULAR; INTRALESIONAL; INTRAMUSCULAR; INTRAVENOUS; SOFT TISSUE PRN
Status: DISCONTINUED | OUTPATIENT
Start: 2024-07-22 | End: 2024-07-22 | Stop reason: SDUPTHER

## 2024-07-22 RX ORDER — SODIUM CHLORIDE 0.9 % (FLUSH) 0.9 %
5-40 SYRINGE (ML) INJECTION EVERY 12 HOURS SCHEDULED
Status: DISCONTINUED | OUTPATIENT
Start: 2024-07-22 | End: 2024-07-29 | Stop reason: HOSPADM

## 2024-07-22 RX ORDER — TIZANIDINE 4 MG/1
4 TABLET ORAL 3 TIMES DAILY
Status: DISCONTINUED | OUTPATIENT
Start: 2024-07-22 | End: 2024-07-29 | Stop reason: HOSPADM

## 2024-07-22 RX ORDER — ACETAMINOPHEN 650 MG/1
650 SUPPOSITORY RECTAL EVERY 6 HOURS PRN
Status: DISCONTINUED | OUTPATIENT
Start: 2024-07-22 | End: 2024-07-29 | Stop reason: HOSPADM

## 2024-07-22 RX ORDER — SODIUM CHLORIDE 0.9 % (FLUSH) 0.9 %
5-40 SYRINGE (ML) INJECTION EVERY 12 HOURS SCHEDULED
Status: DISCONTINUED | OUTPATIENT
Start: 2024-07-22 | End: 2024-07-22 | Stop reason: HOSPADM

## 2024-07-22 RX ORDER — FENTANYL CITRATE 50 UG/ML
INJECTION, SOLUTION INTRAMUSCULAR; INTRAVENOUS PRN
Status: DISCONTINUED | OUTPATIENT
Start: 2024-07-22 | End: 2024-07-22 | Stop reason: SDUPTHER

## 2024-07-22 RX ORDER — HYDRALAZINE HYDROCHLORIDE 20 MG/ML
10 INJECTION INTRAMUSCULAR; INTRAVENOUS
Status: DISCONTINUED | OUTPATIENT
Start: 2024-07-22 | End: 2024-07-22

## 2024-07-22 RX ORDER — M-VIT,TX,IRON,MINS/CALC/FOLIC 27MG-0.4MG
1 TABLET ORAL DAILY
Status: DISCONTINUED | OUTPATIENT
Start: 2024-07-22 | End: 2024-07-29 | Stop reason: HOSPADM

## 2024-07-22 RX ORDER — INSULIN LISPRO 100 [IU]/ML
0-4 INJECTION, SOLUTION INTRAVENOUS; SUBCUTANEOUS
Status: DISCONTINUED | OUTPATIENT
Start: 2024-07-22 | End: 2024-07-29 | Stop reason: HOSPADM

## 2024-07-22 RX ORDER — OXYCODONE HYDROCHLORIDE 5 MG/1
10 TABLET ORAL 2 TIMES DAILY
Status: DISCONTINUED | OUTPATIENT
Start: 2024-07-22 | End: 2024-07-29 | Stop reason: HOSPADM

## 2024-07-22 RX ORDER — LANOLIN ALCOHOL/MO/W.PET/CERES
1000 CREAM (GRAM) TOPICAL DAILY
Status: DISCONTINUED | OUTPATIENT
Start: 2024-07-22 | End: 2024-07-29 | Stop reason: HOSPADM

## 2024-07-22 RX ORDER — FENTANYL CITRATE 50 UG/ML
100 INJECTION, SOLUTION INTRAMUSCULAR; INTRAVENOUS ONCE
Status: COMPLETED | OUTPATIENT
Start: 2024-07-22 | End: 2024-07-22

## 2024-07-22 RX ORDER — PANTOPRAZOLE SODIUM 40 MG/1
40 TABLET, DELAYED RELEASE ORAL
Status: DISCONTINUED | OUTPATIENT
Start: 2024-07-23 | End: 2024-07-29 | Stop reason: HOSPADM

## 2024-07-22 RX ADMIN — FENTANYL CITRATE 50 MCG: 50 INJECTION, SOLUTION INTRAMUSCULAR; INTRAVENOUS at 08:54

## 2024-07-22 RX ADMIN — ASPIRIN 81 MG: 81 TABLET, COATED ORAL at 16:10

## 2024-07-22 RX ADMIN — SODIUM CHLORIDE, PRESERVATIVE FREE 10 ML: 5 INJECTION INTRAVENOUS at 22:21

## 2024-07-22 RX ADMIN — ROCURONIUM BROMIDE 50 MG: 10 INJECTION, SOLUTION INTRAVENOUS at 07:11

## 2024-07-22 RX ADMIN — LEVOTHYROXINE SODIUM 125 MCG: 25 TABLET ORAL at 16:10

## 2024-07-22 RX ADMIN — ONDANSETRON 4 MG: 2 INJECTION INTRAMUSCULAR; INTRAVENOUS at 11:14

## 2024-07-22 RX ADMIN — ONDANSETRON 4 MG: 2 INJECTION INTRAMUSCULAR; INTRAVENOUS at 10:39

## 2024-07-22 RX ADMIN — GLYCOPYRROLATE 0.4 MG: 0.2 INJECTION, SOLUTION INTRAMUSCULAR; INTRAVENOUS at 10:27

## 2024-07-22 RX ADMIN — CLOZAPINE 100 MG: 100 TABLET ORAL at 23:41

## 2024-07-22 RX ADMIN — LIDOCAINE HYDROCHLORIDE 40 MG: 20 INJECTION, SOLUTION EPIDURAL; INFILTRATION; INTRACAUDAL; PERINEURAL at 07:11

## 2024-07-22 RX ADMIN — ROPIVACAINE HYDROCHLORIDE 20 ML: 5 INJECTION, SOLUTION EPIDURAL; INFILTRATION; PERINEURAL at 06:53

## 2024-07-22 RX ADMIN — MIDAZOLAM HYDROCHLORIDE 2 MG: 2 INJECTION, SOLUTION INTRAMUSCULAR; INTRAVENOUS at 06:48

## 2024-07-22 RX ADMIN — GLYCOPYRROLATE 0.2 MG: 0.2 INJECTION, SOLUTION INTRAMUSCULAR; INTRAVENOUS at 07:06

## 2024-07-22 RX ADMIN — HYDROMORPHONE HYDROCHLORIDE 1 MG: 2 INJECTION, SOLUTION INTRAMUSCULAR; INTRAVENOUS; SUBCUTANEOUS at 09:47

## 2024-07-22 RX ADMIN — WATER 1000 MG: 1 INJECTION INTRAMUSCULAR; INTRAVENOUS; SUBCUTANEOUS at 09:49

## 2024-07-22 RX ADMIN — KETOROLAC TROMETHAMINE 30 MG: 30 INJECTION, SOLUTION INTRAMUSCULAR; INTRAVENOUS at 10:37

## 2024-07-22 RX ADMIN — WATER 2000 MG: 1 INJECTION INTRAMUSCULAR; INTRAVENOUS; SUBCUTANEOUS at 07:21

## 2024-07-22 RX ADMIN — ONDANSETRON 4 MG: 2 INJECTION INTRAMUSCULAR; INTRAVENOUS at 10:21

## 2024-07-22 RX ADMIN — Medication 1 TABLET: at 16:10

## 2024-07-22 RX ADMIN — TIZANIDINE 4 MG: 4 TABLET ORAL at 16:09

## 2024-07-22 RX ADMIN — FENTANYL CITRATE 50 MCG: 50 INJECTION, SOLUTION INTRAMUSCULAR; INTRAVENOUS at 10:21

## 2024-07-22 RX ADMIN — FENTANYL CITRATE 100 MCG: 50 INJECTION, SOLUTION INTRAMUSCULAR; INTRAVENOUS at 06:48

## 2024-07-22 RX ADMIN — ALPRAZOLAM 0.75 MG: 0.25 TABLET ORAL at 16:13

## 2024-07-22 RX ADMIN — Medication 3 MG: at 10:27

## 2024-07-22 RX ADMIN — ENOXAPARIN SODIUM 30 MG: 100 INJECTION SUBCUTANEOUS at 22:15

## 2024-07-22 RX ADMIN — FENTANYL CITRATE 100 MCG: 50 INJECTION INTRAMUSCULAR; INTRAVENOUS at 06:48

## 2024-07-22 RX ADMIN — FENTANYL CITRATE 50 MCG: 50 INJECTION, SOLUTION INTRAMUSCULAR; INTRAVENOUS at 08:22

## 2024-07-22 RX ADMIN — ACETAMINOPHEN 650 MG: 325 TABLET ORAL at 16:09

## 2024-07-22 RX ADMIN — DIPHENHYDRAMINE HYDROCHLORIDE 25 MG: 50 INJECTION, SOLUTION INTRAMUSCULAR; INTRAVENOUS at 07:06

## 2024-07-22 RX ADMIN — DOCUSATE SODIUM 100 MG: 100 CAPSULE, LIQUID FILLED ORAL at 22:16

## 2024-07-22 RX ADMIN — SODIUM CHLORIDE: 9 INJECTION, SOLUTION INTRAVENOUS at 07:05

## 2024-07-22 RX ADMIN — CYANOCOBALAMIN TAB 1000 MCG 1000 MCG: 1000 TAB at 16:10

## 2024-07-22 RX ADMIN — SODIUM CHLORIDE: 9 INJECTION, SOLUTION INTRAVENOUS at 09:47

## 2024-07-22 RX ADMIN — MIDAZOLAM 2 MG: 1 INJECTION INTRAMUSCULAR; INTRAVENOUS at 07:06

## 2024-07-22 RX ADMIN — HYDROMORPHONE HYDROCHLORIDE 1 MG: 2 INJECTION, SOLUTION INTRAMUSCULAR; INTRAVENOUS; SUBCUTANEOUS at 09:14

## 2024-07-22 RX ADMIN — OXYCODONE HYDROCHLORIDE 10 MG: 5 TABLET ORAL at 22:14

## 2024-07-22 RX ADMIN — OXYCODONE HYDROCHLORIDE 10 MG: 5 TABLET ORAL at 17:34

## 2024-07-22 RX ADMIN — ALPRAZOLAM 0.75 MG: 0.25 TABLET ORAL at 22:14

## 2024-07-22 RX ADMIN — MIDAZOLAM HYDROCHLORIDE 2 MG: 1 INJECTION, SOLUTION INTRAMUSCULAR; INTRAVENOUS at 06:48

## 2024-07-22 RX ADMIN — ROPIVACAINE HYDROCHLORIDE 30 ML: 5 INJECTION, SOLUTION EPIDURAL; INFILTRATION; PERINEURAL at 06:48

## 2024-07-22 RX ADMIN — OXYCODONE 10 MG: 5 TABLET ORAL at 12:47

## 2024-07-22 RX ADMIN — PROPOFOL 200 MG: 10 INJECTION, EMULSION INTRAVENOUS at 07:11

## 2024-07-22 RX ADMIN — TIZANIDINE 4 MG: 4 TABLET ORAL at 22:15

## 2024-07-22 RX ADMIN — KETOROLAC TROMETHAMINE 30 MG: 30 INJECTION, SOLUTION INTRAMUSCULAR; INTRAVENOUS at 10:36

## 2024-07-22 RX ADMIN — FENTANYL CITRATE 50 MCG: 50 INJECTION, SOLUTION INTRAMUSCULAR; INTRAVENOUS at 07:11

## 2024-07-22 RX ADMIN — DEXAMETHASONE SODIUM PHOSPHATE 8 MG: 4 INJECTION, SOLUTION INTRAMUSCULAR; INTRAVENOUS at 08:56

## 2024-07-22 ASSESSMENT — LIFESTYLE VARIABLES: SMOKING_STATUS: 0

## 2024-07-22 ASSESSMENT — PAIN SCALES - GENERAL
PAINLEVEL_OUTOF10: 8
PAINLEVEL_OUTOF10: 0
PAINLEVEL_OUTOF10: 0
PAINLEVEL_OUTOF10: 4
PAINLEVEL_OUTOF10: 8
PAINLEVEL_OUTOF10: 0
PAINLEVEL_OUTOF10: 8
PAINLEVEL_OUTOF10: 0
PAINLEVEL_OUTOF10: 0

## 2024-07-22 ASSESSMENT — PAIN DESCRIPTION - DESCRIPTORS
DESCRIPTORS: ACHING;DISCOMFORT;SORE;TENDER
DESCRIPTORS: ACHING;DISCOMFORT
DESCRIPTORS: SORE
DESCRIPTORS: ACHING;DISCOMFORT
DESCRIPTORS: ACHING;DULL
DESCRIPTORS: ACHING;DISCOMFORT;SHARP
DESCRIPTORS: ACHING;THROBBING

## 2024-07-22 ASSESSMENT — PAIN DESCRIPTION - ORIENTATION
ORIENTATION: RIGHT
ORIENTATION: LOWER
ORIENTATION: RIGHT;LEFT

## 2024-07-22 ASSESSMENT — PAIN DESCRIPTION - LOCATION
LOCATION: BACK
LOCATION: FOOT
LOCATION: BACK

## 2024-07-22 ASSESSMENT — PAIN DESCRIPTION - PAIN TYPE
TYPE: CHRONIC PAIN
TYPE: CHRONIC PAIN

## 2024-07-22 ASSESSMENT — PAIN - FUNCTIONAL ASSESSMENT
PAIN_FUNCTIONAL_ASSESSMENT: NONE - DENIES PAIN
PAIN_FUNCTIONAL_ASSESSMENT: PREVENTS OR INTERFERES SOME ACTIVE ACTIVITIES AND ADLS
PAIN_FUNCTIONAL_ASSESSMENT: ACTIVITIES ARE NOT PREVENTED
PAIN_FUNCTIONAL_ASSESSMENT: 0-10

## 2024-07-22 NOTE — ANESTHESIA PRE PROCEDURE
Department of Anesthesiology  Preprocedure Note       Name:  Ronny Uribe   Age:  44 y.o.  :  1980                                          MRN:  [id number]         Date:  2024      Surgeon: Surgeon(s):  Cj Nguyen DPM    Procedure: Procedure(s):  RIGHT FOOT ENDOSCOPIC GASTRONEMUS RECESSION  DOUBLE CALCANEAL OSTEOTOMY MEDIAL COLUMN LAPIDUS EXTENSOR HALLUCIS LONGUS LENGTHENING AND REPAIR PERONEAL BREVIS TENDON REPAIR  +++[id number]+++ +++IODINE ALLERGY - LATEX ALLERGY+++   ++PNB++    Medications prior to admission:   Prior to Admission medications    Medication Sig Start Date End Date Taking? Authorizing Provider   linaclotide (LINZESS) 145 MCG capsule Take 1 capsule by mouth every morning (before breakfast)   Yes Yue Griffith MD   omeprazole (PRILOSEC) 20 MG delayed release capsule Take 1 capsule by mouth daily   Yes ProviderYue MD   nitroGLYCERIN (NITROSTAT) 0.3 MG SL tablet Place 1 tablet under the tongue every 5 minutes as needed for Chest pain up to max of 3 total doses. If no relief after 1 dose, call 911.   Yes ProviderYue MD   ondansetron (ZOFRAN) 4 MG tablet Take 1 tablet by mouth every 8 hours as needed for Nausea or Vomiting   Yes Provider, MD Yue   aspirin 81 MG EC tablet Take 1 tablet by mouth daily   Yes Provider, MD Yue   docusate sodium (COLACE) 100 MG capsule Take 1 capsule by mouth 2 times daily   Yes Provider, MD Yue   furosemide (LASIX) 20 MG tablet Take 1 tablet by mouth 2 times daily   Yes ProviderYue MD   montelukast (SINGULAIR) 10 MG tablet Take 1 tablet by mouth daily   Yes Provider, MD Yue   Multiple Vitamins-Minerals (THERAPEUTIC MULTIVITAMIN-MINERALS) tablet Take 1 tablet by mouth daily   Yes ProviderYue MD   fluticasone-salmeterol (ADVAIR) 100-50 MCG/ACT AEPB diskus inhaler Inhale 1 puff into the lungs in the morning and 1 puff in the evening.   Yes ProviderYue MD   Erenumab-aooe

## 2024-07-22 NOTE — ANESTHESIA PROCEDURE NOTES
Peripheral Block    Patient location during procedure: procedure area  Reason for block: post-op pain management and at surgeon's request  Start time: 7/22/2024 6:48 AM  End time: 7/22/2024 6:53 AM  Staffing  Performed: anesthesiologist   Anesthesiologist: Sal Medellin MD  Performed by: Sal Medellin MD  Authorized by: Sal Medellin MD    Preanesthetic Checklist  Completed: patient identified, IV checked, site marked, risks and benefits discussed, surgical/procedural consents, equipment checked, pre-op evaluation, timeout performed, anesthesia consent given, oxygen available, monitors applied/VS acknowledged, fire risk safety assessment completed and verbalized and blood product R/B/A discussed and consented  Peripheral Block   Patient position: supine  Prep: ChloraPrep  Provider prep: mask and sterile gloves  Patient monitoring: cardiac monitor, continuous pulse ox, frequent blood pressure checks and IV access  Block type: Sciatic  Popliteal  Laterality: right  Injection technique: single-shot  Guidance: ultrasound guided    Needle   Needle type: insulated echogenic nerve stimulator needle   Needle gauge: 20 G  Needle localization: ultrasound guidance  Needle length: 10 cm  Assessment   Injection assessment: negative aspiration for heme, no paresthesia on injection, local visualized surrounding nerve on ultrasound and no intravascular symptoms  Paresthesia pain: none  Slow fractionated injection: yes  Hemodynamics: stable  Outcomes: patient tolerated procedure well and uncomplicated    Medications Administered  ropivacaine (NAROPIN) injection 0.5% - Perineural, Knee Right   30 mL - 7/22/2024 6:48:00 AM

## 2024-07-22 NOTE — ANESTHESIA POSTPROCEDURE EVALUATION
Department of Anesthesiology  Postprocedure Note    Patient: Ronny Uribe  MRN: [id number]  YOB: 1980  Date of evaluation: 7/22/2024    Procedure Summary       Date: 07/22/24 Room / Location: [address]    Anesthesia Start: 0706 Anesthesia Stop: 1044    Procedures:       RIGHT FOOT ENDOSCOPIC GASTROCNEMUS RECESSION (Right: Foot)      PERCUTANEOUS CALCANEAL OSTEOTOMY, RICK CALCANEAL OSTEOTOMY, NAVICULAR CUNEIFORM ARTHRODESIS, FLEXOR HALLICUS LONGUS TENOTOMY, MODIFIED BROSTROM PROCEDURE, MEDIAL COLUMN FUSION AND LAPIDUS, PERONEAL BREVIS TENDON REPAIR + PNB (Right: Foot) Diagnosis:       Acquired external rotation of foot, right      Traumatic rupture of peroneal tendon, right, sequela      Acquired hallux valgus of right foot      Arthritis of right ankle      (Acquired external rotation of foot, right [M21.6X1])      (Traumatic rupture of peroneal tendon, right, sequela [S86.311S])      (Acquired hallux valgus of right foot [M20.11])      (Arthritis of right ankle [M19.071])    Surgeons: Cj Nguyen DPM Responsible Provider: Sal Medellin MD    Anesthesia Type: General ASA Status: 3            Anesthesia Type: General    Michaela Phase I: Michaela Score: 10    Michaela Phase II:      Anesthesia Post Evaluation    Patient location during evaluation: PACU  Patient participation: complete - patient participated  Level of consciousness: awake and alert  Pain score: 0  Airway patency: patent  Nausea & Vomiting: no vomiting and no nausea  Cardiovascular status: blood pressure returned to baseline and hemodynamically stable  Respiratory status: nonlabored ventilation, spontaneous ventilation, face mask and acceptable  Hydration status: stable  Pain management: adequate and satisfactory to patient        No notable events documented.

## 2024-07-22 NOTE — CARE COORDINATION
Social Work/Transition of Care:    SW seen pt in PACU, SW received call from Stage II nurse stating pts father at bedside and is unable to take pt home.    SW introduced self and role, pt reported she can not use crutches and she is NWB on her right foot, pts father reported when the surgery was explained to them pt was to have a walking cast.  Pt reported prior to the procedure she was walking with a cane and was independent.  Pt stated her boyfriends home is all on one floor and she is able to stay there but there are several steps to get into the home.  SW discussed [facility] placement and pt stated she would like to go to [facility], SW requested PT/OT christin to initiate transfer.  Pt to be admitted observation in order for placement.  1592 Call placed to [facility] SW spoke with admission director they are unable to accommodate pt for admission, assigned SW/CM to follow up with pt to provide additional choices.      Electronically signed by SHERIE wilkerson on 7/22/2024 at 2:57 PM

## 2024-07-22 NOTE — OP NOTE
fixated and 2-0 K-wires were inserted from the distal phalanx across the IPJ, across the MTP, reducing the deformities into neutral alignment.  The skin was closed using 2-0 nylon and Dermabond.    Procedure Number 7: Repair of peroneus brevis tendon.     An incision was made over peroneus brevis, was deepened in the same plane using sharp and blunt dissection avoiding neurovascular structures, carried down to the deep tissues.  At this time, the peroneus brevis longus tendon was identified.  The peroneus brevis tendon was noted to have a tear in it; this was debrided, resected, and sent for pathology.  The deep tissues were closed using 0 Vicryl and skin was closed using 2-0 nylon and Dermabond.      Procedure Number 8: Modified Brostrom    At this time, stress views were performed and noted to have inverted ankle, unstable and we determined to perform a modified Brostrom procedure.  This was performed with the ankle held in neutral position and ellipse of ATF and CF ligaments were performed along with the capsular tissue.  The capsular tissues reduced, held in neutral position, and sutured with 0 Vicryl with a \"vest over pants\" technique, holding this nicely and 2-0 nylon and Dermabond.      At 2 hours, the tourniquet was dropped and good capillary refill time was noted to return to all digits of the right foot.  All wounds were also glued with Dermabond.  Surgical wounds were dressed with Betadine-soaked Adaptic, 4x4s, Kerry, and a sterile compressive fashion.  An Univalve BK cast was applied to the right lower extremity.  She tolerated the procedure and anesthesia well, left the OR with vital signs stable and vascular status intact.  She had no complications during the surgery.  Good anatomic alignment was noted throughout the procedure and under fluoroscopy.          SOHEILA DUTTON DPM      D:  07/22/2024 11:04:13     T:  07/22/2024 14:35:03     MARLON/ROB  Job #:  [id number]     Doc#:  [id number]

## 2024-07-22 NOTE — ANESTHESIA PROCEDURE NOTES
Peripheral Block    Patient location during procedure: procedure area  Reason for block: post-op pain management and at surgeon's request  Start time: 7/22/2024 6:53 AM  End time: 7/22/2024 6:58 AM  Staffing  Performed: anesthesiologist   Anesthesiologist: Sal Medellin MD  Performed by: Sal Medellin MD  Authorized by: Sal Medellin MD    Preanesthetic Checklist  Completed: patient identified, IV checked, site marked, risks and benefits discussed, surgical/procedural consents, equipment checked, pre-op evaluation, timeout performed, anesthesia consent given, oxygen available and monitors applied/VS acknowledged  Peripheral Block   Patient position: supine  Prep: ChloraPrep  Provider prep: mask and sterile gloves  Patient monitoring: cardiac monitor, frequent blood pressure checks and IV access  Block type: Femoral  Adductor canal  Laterality: left  Injection technique: single-shot  Guidance: ultrasound guided    Needle   Needle type: combined needle/nerve stimulator   Needle gauge: 22 G  Needle localization: ultrasound guidance  Needle length: 10 cm  Assessment   Injection assessment: negative aspiration for heme, no paresthesia on injection and local visualized surrounding nerve on ultrasound  Paresthesia pain: none  Slow fractionated injection: yes  Hemodynamics: stable  Outcomes: uncomplicated and patient tolerated procedure well    Medications Administered  ropivacaine (NAROPIN) injection 0.5% - Perineural, Thigh Right   20 mL - 7/22/2024 6:53:00 AM

## 2024-07-22 NOTE — BRIEF OP NOTE
Brief Postoperative Note      Patient: Ronny Uribe  YOB: 1980  MRN: [id number]    Date of Procedure: 7/22/2024    Pre-Op Diagnosis Codes:     * Acquired external rotation of foot, right [M21.6X1]     * Traumatic rupture of peroneal tendon, right, sequela [S86.311S]     * Acquired hallux valgus of right foot [M20.11]     * Arthritis of right ankle [M19.071]    Post-Op Diagnosis: Same       Procedure(s):  RIGHT FOOT ENDOSCOPIC GASTROCNEMUS RECESSION  PERCUTANEOUS CALCANEAL OSTEOTOMY, RICK CALCANEAL OSTEOTOMY, NAVICULAR CUNEIFORM ARTHRODESIS, FLEXOR HALLICUS LONGUS TENOTOMY, MODIFIED BROSTROM PROCEDURE, MEDIAL COLUMN FUSION AND LAPIDUS, PERONEAL BREVIS TENDON REPAIR + PNB    Surgeon(s):  Cj Nguyen DPM Abigail, Hannah C, DPM    Assistant:  Resident: Jeanette Lou DPM    Anesthesia: General    Estimated Blood Loss (mL): Minimal    Complications: None    Specimens:   ID Type Source Tests Collected by Time Destination   A : RIGHT FOOT BONE WITH CARTILAGE Bone Bone SURGICAL PATHOLOGY Cj Nguyen DPM 7/22/2024 0846    B : RIGHT PERONEAL BREVIS TENDON Tissue Tissue SURGICAL PATHOLOGY Cj Nguyen DPM 7/22/2024 1015        Implants:  Implant Name Type Inv. Item Serial No.  Lot No. LRB No. Used Action   GRAFT BNE L50MM [id number] THICKNESS 5-30MM ILIUM TRICORT - [id number]  GRAFT BNE L50MM [id number] THICKNESS 5-30MM [employer]UM TRICORT [id number] [employer] [facility]  Right 1 Implanted   SCREW BNE L40MM [id number] FT SELF DRL ST KAYLA LO PROF HD 2 LD - [id number]  SCREW BNE L40MM [id number] FT SELF DRL ST KAYLA LO PROF HD 2 LD  [id number] [facility] [id number] Right 1 Implanted   SCREW BNE L60MM [id number] FT FULL THRDED SELF DRL ST KAYLA LO - [id number] Screw/Plate/Nail/Daniel SCREW BNE L60MM [id number] FT FULL THRDED SELF DRL ST KAYLA LO  [id number] [id number] Right 1 Implanted   GRAFT BNE SUB 15ML 1.7-10MM CANC CHIP MORSELIZED FRZ DRY - [id number]  GRAFT BNE SUB 15ML 1.7-10MM CANC

## 2024-07-22 NOTE — DISCHARGE INSTRUCTIONS
Take all medications as prescribed  Nonweightbearing to operative extremity  Leave dressing on and keep clean, dry, and intact  Follow up in clinic in one week

## 2024-07-22 NOTE — H&P
[facility] Hospitalist Group History and Physical      CHIEF COMPLAINT:  for nursing home placement following podiatry procedure     History of Present Illness:      This is a 44 year old female with past medical history of arthritis, asthma, diabetes, bipolar disorder, paranoid schizophrenia, agoraphobia, depression, seasonal affective disorder, fibromyalgia, polyneuropathy, GERD who presents to hospital today for elective podiatry procedure. Preop diagnosis was traumatic rupture of peroneal tendon, acquired external rotation. Patient underwent extensive surgery: right foot endoscopic gastrocnemus recession percutaneous calcaneal osteotomy, giang calcaneal osteotomy, navicular cuneiform arthrodesis, flexor hallicus longus tenotomy, modified brostrom procedure, medial column fusion and lapidus, peroneal brevis tendon repair and PNB. Patient is not able to take care of herself at home due to this extensive surgery and will need rehab placement during recovery.     Informant(s) for H&P: patient     REVIEW OF SYSTEMS:  A comprehensive review of systems was negative except for: what is in the HPI    PMH:  Past Medical History:   Diagnosis Date    Agoraphobia     Arthritis     osteoarthritis    Asthma     well controlled 1/27/22    Atypical nevi     SEV ATN L flank 2019, Mod to Sev ATN L mid back 2019    Bipolar 1 disorder (HCC)     Borderline personality disorder (HCC)     Claustrophobia     Diabetes mellitus (HCC)     Difficulty walking     lack of coordination    Dissociative disorder     Fibromyalgia     Foot pain, right     Generalized anxiety disorder     GERD (gastroesophageal reflux disease)     H/O migraine     Hidradenitis suppurativa     usually axillas    Hypoglycemia     IBS (irritable bowel syndrome)     Muscle weakness     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     SCHEDULED   FOR THE SURGERY ON 08/18/21    Nutritional anemia     Osteopenia     Other manic episodes (HCC)     Paranoid

## 2024-07-23 LAB
ALBUMIN SERPL-MCNC: 3.3 G/DL (ref 3.5–5.2)
ALP SERPL-CCNC: 90 U/L (ref 35–104)
ALT SERPL-CCNC: 58 U/L (ref 0–32)
ANION GAP SERPL CALCULATED.3IONS-SCNC: 7 MMOL/L (ref 7–16)
AST SERPL-CCNC: 30 U/L (ref 0–31)
BASOPHILS # BLD: 0.03 K/UL (ref 0–0.2)
BASOPHILS NFR BLD: 0 % (ref 0–2)
BILIRUB SERPL-MCNC: 0.3 MG/DL (ref 0–1.2)
BUN SERPL-MCNC: 16 MG/DL (ref 6–20)
CALCIUM SERPL-MCNC: 8.6 MG/DL (ref 8.6–10.2)
CHLORIDE SERPL-SCNC: 106 MMOL/L (ref 98–107)
CO2 SERPL-SCNC: 24 MMOL/L (ref 22–29)
CREAT SERPL-MCNC: 0.8 MG/DL (ref 0.5–1)
EOSINOPHIL # BLD: 0.12 K/UL (ref 0.05–0.5)
EOSINOPHILS RELATIVE PERCENT: 2 % (ref 0–6)
ERYTHROCYTE [DISTWIDTH] IN BLOOD BY AUTOMATED COUNT: 13.1 % (ref 11.5–15)
GFR, ESTIMATED: >90 ML/MIN/1.73M2
GLUCOSE BLD-MCNC: 111 MG/DL (ref 74–99)
GLUCOSE BLD-MCNC: 86 MG/DL (ref 74–99)
GLUCOSE BLD-MCNC: 89 MG/DL (ref 74–99)
GLUCOSE BLD-MCNC: 97 MG/DL (ref 74–99)
GLUCOSE SERPL-MCNC: 91 MG/DL (ref 74–99)
HCT VFR BLD AUTO: 32.1 % (ref 34–48)
HGB BLD-MCNC: 10 G/DL (ref 11.5–15.5)
IMM GRANULOCYTES # BLD AUTO: <0.03 K/UL (ref 0–0.58)
IMM GRANULOCYTES NFR BLD: 0 % (ref 0–5)
LYMPHOCYTES NFR BLD: 2.26 K/UL (ref 1.5–4)
LYMPHOCYTES RELATIVE PERCENT: 30 % (ref 20–42)
MCH RBC QN AUTO: 29.5 PG (ref 26–35)
MCHC RBC AUTO-ENTMCNC: 31.2 G/DL (ref 32–34.5)
MCV RBC AUTO: 94.7 FL (ref 80–99.9)
MONOCYTES NFR BLD: 0.69 K/UL (ref 0.1–0.95)
MONOCYTES NFR BLD: 9 % (ref 2–12)
NEUTROPHILS NFR BLD: 58 % (ref 43–80)
NEUTS SEG NFR BLD: 4.31 K/UL (ref 1.8–7.3)
PLATELET # BLD AUTO: 297 K/UL (ref 130–450)
PMV BLD AUTO: 9.9 FL (ref 7–12)
POTASSIUM SERPL-SCNC: 4 MMOL/L (ref 3.5–5)
PROT SERPL-MCNC: 5.8 G/DL (ref 6.4–8.3)
RBC # BLD AUTO: 3.39 M/UL (ref 3.5–5.5)
SODIUM SERPL-SCNC: 137 MMOL/L (ref 132–146)
WBC OTHER # BLD: 7.4 K/UL (ref 4.5–11.5)

## 2024-07-23 PROCEDURE — G0378 HOSPITAL OBSERVATION PER HR: HCPCS

## 2024-07-23 PROCEDURE — 6370000000 HC RX 637 (ALT 250 FOR IP): Performed by: INTERNAL MEDICINE

## 2024-07-23 PROCEDURE — 6360000002 HC RX W HCPCS

## 2024-07-23 PROCEDURE — 6370000000 HC RX 637 (ALT 250 FOR IP)

## 2024-07-23 PROCEDURE — 6360000002 HC RX W HCPCS: Performed by: INTERNAL MEDICINE

## 2024-07-23 PROCEDURE — 80053 COMPREHEN METABOLIC PANEL: CPT

## 2024-07-23 PROCEDURE — 99233 SBSQ HOSP IP/OBS HIGH 50: CPT | Performed by: STUDENT IN AN ORGANIZED HEALTH CARE EDUCATION/TRAINING PROGRAM

## 2024-07-23 PROCEDURE — 97530 THERAPEUTIC ACTIVITIES: CPT

## 2024-07-23 PROCEDURE — 2580000003 HC RX 258: Performed by: INTERNAL MEDICINE

## 2024-07-23 PROCEDURE — 36415 COLL VENOUS BLD VENIPUNCTURE: CPT

## 2024-07-23 PROCEDURE — 82962 GLUCOSE BLOOD TEST: CPT

## 2024-07-23 PROCEDURE — 85025 COMPLETE CBC W/AUTO DIFF WBC: CPT

## 2024-07-23 PROCEDURE — 96372 THER/PROPH/DIAG INJ SC/IM: CPT

## 2024-07-23 PROCEDURE — 6370000000 HC RX 637 (ALT 250 FOR IP): Performed by: STUDENT IN AN ORGANIZED HEALTH CARE EDUCATION/TRAINING PROGRAM

## 2024-07-23 PROCEDURE — 96374 THER/PROPH/DIAG INJ IV PUSH: CPT

## 2024-07-23 RX ORDER — DOXYCYCLINE HYCLATE 100 MG/1
100 CAPSULE ORAL EVERY 12 HOURS SCHEDULED
Status: DISCONTINUED | OUTPATIENT
Start: 2024-07-23 | End: 2024-07-29 | Stop reason: HOSPADM

## 2024-07-23 RX ADMIN — SODIUM CHLORIDE, PRESERVATIVE FREE 10 ML: 5 INJECTION INTRAVENOUS at 08:08

## 2024-07-23 RX ADMIN — ONDANSETRON 4 MG: 2 INJECTION INTRAMUSCULAR; INTRAVENOUS at 20:17

## 2024-07-23 RX ADMIN — Medication 1 TABLET: at 08:05

## 2024-07-23 RX ADMIN — ACETAMINOPHEN 650 MG: 325 TABLET ORAL at 18:03

## 2024-07-23 RX ADMIN — TIZANIDINE 4 MG: 4 TABLET ORAL at 20:17

## 2024-07-23 RX ADMIN — CLOZAPINE 100 MG: 100 TABLET ORAL at 22:28

## 2024-07-23 RX ADMIN — OXYCODONE HYDROCHLORIDE 10 MG: 5 TABLET ORAL at 08:05

## 2024-07-23 RX ADMIN — FAMOTIDINE 40 MG: 20 TABLET ORAL at 08:06

## 2024-07-23 RX ADMIN — ALPRAZOLAM 0.75 MG: 0.25 TABLET ORAL at 16:37

## 2024-07-23 RX ADMIN — TIZANIDINE 4 MG: 4 TABLET ORAL at 08:07

## 2024-07-23 RX ADMIN — MONTELUKAST SODIUM 10 MG: 10 TABLET, FILM COATED ORAL at 08:07

## 2024-07-23 RX ADMIN — ASPIRIN 81 MG: 81 TABLET, COATED ORAL at 08:04

## 2024-07-23 RX ADMIN — OXYCODONE HYDROCHLORIDE 10 MG: 5 TABLET ORAL at 12:00

## 2024-07-23 RX ADMIN — ACETAMINOPHEN 650 MG: 325 TABLET ORAL at 10:30

## 2024-07-23 RX ADMIN — TIZANIDINE 4 MG: 4 TABLET ORAL at 14:48

## 2024-07-23 RX ADMIN — PANTOPRAZOLE SODIUM 40 MG: 40 TABLET, DELAYED RELEASE ORAL at 06:04

## 2024-07-23 RX ADMIN — OXYCODONE HYDROCHLORIDE 10 MG: 5 TABLET ORAL at 04:54

## 2024-07-23 RX ADMIN — OXYCODONE HYDROCHLORIDE 10 MG: 5 TABLET ORAL at 16:12

## 2024-07-23 RX ADMIN — SODIUM CHLORIDE, PRESERVATIVE FREE 10 ML: 5 INJECTION INTRAVENOUS at 20:18

## 2024-07-23 RX ADMIN — FUROSEMIDE 20 MG: 20 TABLET ORAL at 16:12

## 2024-07-23 RX ADMIN — ALPRAZOLAM 0.75 MG: 0.25 TABLET ORAL at 08:04

## 2024-07-23 RX ADMIN — OXYCODONE HYDROCHLORIDE 10 MG: 5 TABLET ORAL at 20:17

## 2024-07-23 RX ADMIN — ENOXAPARIN SODIUM 30 MG: 100 INJECTION SUBCUTANEOUS at 08:07

## 2024-07-23 RX ADMIN — ALPRAZOLAM 0.75 MG: 0.25 TABLET ORAL at 20:17

## 2024-07-23 RX ADMIN — CYANOCOBALAMIN TAB 1000 MCG 1000 MCG: 1000 TAB at 08:03

## 2024-07-23 RX ADMIN — DOXYCYCLINE HYCLATE 100 MG: 100 CAPSULE ORAL at 20:17

## 2024-07-23 RX ADMIN — DOCUSATE SODIUM 100 MG: 100 CAPSULE, LIQUID FILLED ORAL at 20:17

## 2024-07-23 RX ADMIN — ENOXAPARIN SODIUM 30 MG: 100 INJECTION SUBCUTANEOUS at 20:17

## 2024-07-23 RX ADMIN — LEVOTHYROXINE SODIUM 125 MCG: 25 TABLET ORAL at 06:04

## 2024-07-23 RX ADMIN — DOCUSATE SODIUM 100 MG: 100 CAPSULE, LIQUID FILLED ORAL at 08:07

## 2024-07-23 RX ADMIN — ALPRAZOLAM 0.75 MG: 0.25 TABLET ORAL at 13:11

## 2024-07-23 RX ADMIN — FUROSEMIDE 20 MG: 20 TABLET ORAL at 08:04

## 2024-07-23 ASSESSMENT — PAIN SCALES - WONG BAKER
WONGBAKER_NUMERICALRESPONSE: HURTS A LITTLE BIT
WONGBAKER_NUMERICALRESPONSE: NO HURT

## 2024-07-23 ASSESSMENT — PAIN - FUNCTIONAL ASSESSMENT
PAIN_FUNCTIONAL_ASSESSMENT: ACTIVITIES ARE NOT PREVENTED
PAIN_FUNCTIONAL_ASSESSMENT: PREVENTS OR INTERFERES SOME ACTIVE ACTIVITIES AND ADLS
PAIN_FUNCTIONAL_ASSESSMENT: PREVENTS OR INTERFERES SOME ACTIVE ACTIVITIES AND ADLS

## 2024-07-23 ASSESSMENT — PAIN DESCRIPTION - ORIENTATION
ORIENTATION: RIGHT

## 2024-07-23 ASSESSMENT — PAIN DESCRIPTION - DESCRIPTORS
DESCRIPTORS: ACHING;THROBBING
DESCRIPTORS: ACHING;SHARP
DESCRIPTORS: ACHING;SORE
DESCRIPTORS: ACHING;SHARP
DESCRIPTORS: ACHING;THROBBING

## 2024-07-23 ASSESSMENT — PAIN SCALES - GENERAL
PAINLEVEL_OUTOF10: 4
PAINLEVEL_OUTOF10: 1
PAINLEVEL_OUTOF10: 9
PAINLEVEL_OUTOF10: 8
PAINLEVEL_OUTOF10: 9
PAINLEVEL_OUTOF10: 9
PAINLEVEL_OUTOF10: 8
PAINLEVEL_OUTOF10: 9

## 2024-07-23 ASSESSMENT — PAIN DESCRIPTION - LOCATION
LOCATION: FOOT
LOCATION: LEG;FOOT
LOCATION: FOOT

## 2024-07-23 NOTE — PLAN OF CARE
Problem: Chronic Conditions and Co-morbidities  Goal: Patient's chronic conditions and co-morbidity symptoms are monitored and maintained or improved  7/23/2024 0021 by Xochilt Mujica RN  Outcome: Progressing     Problem: Discharge Planning  Goal: Discharge to home or other facility with appropriate resources  7/23/2024 0021 by Xochilt Mujica RN  Outcome: Progressing     Problem: Pain  Goal: Verbalizes/displays adequate comfort level or baseline comfort level  7/23/2024 0021 by Xochilt Mujica RN  Outcome: Progressing     Problem: Safety - Adult  Goal: Free from fall injury  Outcome: Progressing

## 2024-07-23 NOTE — PATIENT CARE CONFERENCE
P Quality Flow/Interdisciplinary Rounds Progress Note        Quality Flow Rounds held on July 23, 2024    Disciplines Attending:  Bedside Nurse, , , and Nursing Unit Leadership    Ronny Uribe was admitted on 7/22/2024  4:55 AM    Anticipated Discharge Date:       Disposition:    Rei Score:  Rei Scale Score: 21    Readmission Risk              Risk of Unplanned Readmission:  0           Discussed patient goal for the day, patient clinical progression, and barriers to discharge.  The following Goal(s) of the Day/Commitment(s) have been identified:  dc planning      Usha Galdamez RN  July 23, 2024

## 2024-07-23 NOTE — CARE COORDINATION
Social Work discharge planning  SW met with pt, who said she has chosen [facility] in [place], [place].  SW spoke to their admissions today [phone] and faxed referral to fax [phone].   Also gave pt snf list from Medicare.gov in her home area to make more choices asap today.   Electronically signed by ИВАН Loomis on 7/23/2024 at 11:27 AM     Addendum  Pt has chosen:   2. [facility] [phone] left message for Mimi and faxed referral to [phone].  3. [facility] [phone] - spoke to Arminda in admits and she said they are NOT in network with pt's insurance.  4. [facility] [phone] - option 2. LVM for [facility], and faxed referral to [phone] per his voicemail.  Electronically signed by ИВАН Loomis on 7/23/2024 at 1:53 PM    Addendum  Per Mimi at [facility], they are not in network with pt's insurance. And they do not have bed.   Awaiting  Lu's response.  Electronically signed by ИВАН Loomis on 7/23/2024 at 2:36 PM

## 2024-07-23 NOTE — CONSULTS
bedtime  acarbose (PRECOSE) 100 MG tablet, Take 0.5 tablets by mouth 4 times daily  albuterol sulfate HFA (PROVENTIL HFA) 108 (90 Base) MCG/ACT inhaler, Inhale 2 puffs into the lungs every 6 hours as needed  ALPRAZolam (XANAX) 0.5 MG tablet, Take 1.5 tablets by mouth in the morning, at noon, in the evening, and at bedtime.  cloZAPine (CLOZARIL) 100 MG tablet, Take 3 tablets by mouth nightly  RA VITAMIN B-12 TR 1000 MCG TBCR, Take 1 tablet by mouth daily  EPINEPHrine (EPIPEN JR 2-MARIBEL) 0.15 MG/0.3ML SOAJ, Inject into the skin once as needed   vitamin D (ERGOCALCIFEROL) 41997 units CAPS capsule, Take 1 capsule by mouth every 14 days  famotidine (PEPCID) 20 MG tablet, Take 2 tablets by mouth daily  levothyroxine (SYNTHROID) 100 MCG tablet, Take 125 mcg by mouth Daily    Allergies:  Latex, Fish allergy, Adhesive tape, Fentanyl, Hydrocodone-acetaminophen, Iodine, Ketorolac tromethamine, Medfix ez [wound dressings], Molds & smuts, Other, Pregabalin, Tramadol, Buspirone, Clindamycin, Gabapentin, Metronidazole, and Morphine    Social History:   TOBACCO:   reports that she quit smoking about 3 years ago. Her smoking use included cigarettes. She started smoking about 32 years ago. She has a 28.6 pack-year smoking history. She has never used smokeless tobacco.  ETOH:   reports that she does not currently use alcohol.  DRUGS:   Social History     Substance and Sexual Activity   Drug Use Never       Family History:   History reviewed. No pertinent family history.      REVIEW OF SYSTEMS:    All pertinent positives and negatives as noted in HPI       LOWER EXTREMITY EXAMINATION     Closed toe cast intact to right lower extremity. No calf pain.      CONSULTS:  IP CONSULT TO PODIATRY    MEDICATION:  Scheduled Meds:   scopolamine  1 patch TransDERmal Once    ceFAZolin  2,000 mg IntraVENous 30 Min Pre-Op    sodium chloride flush  5-40 mL IntraVENous 2 times per day    ALPRAZolam  0.75 mg Oral 4x daily    aspirin  81 mg Oral Daily

## 2024-07-24 LAB
ALBUMIN SERPL-MCNC: 3.5 G/DL (ref 3.5–5.2)
ALP SERPL-CCNC: 94 U/L (ref 35–104)
ALT SERPL-CCNC: 42 U/L (ref 0–32)
ANION GAP SERPL CALCULATED.3IONS-SCNC: 9 MMOL/L (ref 7–16)
AST SERPL-CCNC: 22 U/L (ref 0–31)
BASOPHILS # BLD: 0.01 K/UL (ref 0–0.2)
BASOPHILS NFR BLD: 0 % (ref 0–2)
BILIRUB SERPL-MCNC: 0.3 MG/DL (ref 0–1.2)
BUN SERPL-MCNC: 15 MG/DL (ref 6–20)
CALCIUM SERPL-MCNC: 8.8 MG/DL (ref 8.6–10.2)
CHLORIDE SERPL-SCNC: 104 MMOL/L (ref 98–107)
CO2 SERPL-SCNC: 25 MMOL/L (ref 22–29)
CREAT SERPL-MCNC: 0.7 MG/DL (ref 0.5–1)
EOSINOPHIL # BLD: 0.06 K/UL (ref 0.05–0.5)
EOSINOPHILS RELATIVE PERCENT: 1 % (ref 0–6)
ERYTHROCYTE [DISTWIDTH] IN BLOOD BY AUTOMATED COUNT: 13 % (ref 11.5–15)
GFR, ESTIMATED: >90 ML/MIN/1.73M2
GLUCOSE BLD-MCNC: 100 MG/DL (ref 74–99)
GLUCOSE BLD-MCNC: 103 MG/DL (ref 74–99)
GLUCOSE BLD-MCNC: 134 MG/DL (ref 74–99)
GLUCOSE BLD-MCNC: 89 MG/DL (ref 74–99)
GLUCOSE SERPL-MCNC: 104 MG/DL (ref 74–99)
HCT VFR BLD AUTO: 32.1 % (ref 34–48)
HGB BLD-MCNC: 10.2 G/DL (ref 11.5–15.5)
IMM GRANULOCYTES # BLD AUTO: 0.04 K/UL (ref 0–0.58)
IMM GRANULOCYTES NFR BLD: 0 % (ref 0–5)
LYMPHOCYTES NFR BLD: 1.85 K/UL (ref 1.5–4)
LYMPHOCYTES RELATIVE PERCENT: 20 % (ref 20–42)
MCH RBC QN AUTO: 29.3 PG (ref 26–35)
MCHC RBC AUTO-ENTMCNC: 31.8 G/DL (ref 32–34.5)
MCV RBC AUTO: 92.2 FL (ref 80–99.9)
MONOCYTES NFR BLD: 0.77 K/UL (ref 0.1–0.95)
MONOCYTES NFR BLD: 8 % (ref 2–12)
NEUTROPHILS NFR BLD: 71 % (ref 43–80)
NEUTS SEG NFR BLD: 6.57 K/UL (ref 1.8–7.3)
PLATELET # BLD AUTO: 303 K/UL (ref 130–450)
PMV BLD AUTO: 9.9 FL (ref 7–12)
POTASSIUM SERPL-SCNC: 3.8 MMOL/L (ref 3.5–5)
PROT SERPL-MCNC: 6.6 G/DL (ref 6.4–8.3)
RBC # BLD AUTO: 3.48 M/UL (ref 3.5–5.5)
SODIUM SERPL-SCNC: 138 MMOL/L (ref 132–146)
WBC OTHER # BLD: 9.3 K/UL (ref 4.5–11.5)

## 2024-07-24 PROCEDURE — 2580000003 HC RX 258

## 2024-07-24 PROCEDURE — 6360000002 HC RX W HCPCS: Performed by: INTERNAL MEDICINE

## 2024-07-24 PROCEDURE — 6370000000 HC RX 637 (ALT 250 FOR IP): Performed by: INTERNAL MEDICINE

## 2024-07-24 PROCEDURE — 6370000000 HC RX 637 (ALT 250 FOR IP): Performed by: STUDENT IN AN ORGANIZED HEALTH CARE EDUCATION/TRAINING PROGRAM

## 2024-07-24 PROCEDURE — 6360000002 HC RX W HCPCS

## 2024-07-24 PROCEDURE — 2580000003 HC RX 258: Performed by: INTERNAL MEDICINE

## 2024-07-24 PROCEDURE — 99233 SBSQ HOSP IP/OBS HIGH 50: CPT | Performed by: STUDENT IN AN ORGANIZED HEALTH CARE EDUCATION/TRAINING PROGRAM

## 2024-07-24 PROCEDURE — 6370000000 HC RX 637 (ALT 250 FOR IP)

## 2024-07-24 PROCEDURE — 80053 COMPREHEN METABOLIC PANEL: CPT

## 2024-07-24 PROCEDURE — G0378 HOSPITAL OBSERVATION PER HR: HCPCS

## 2024-07-24 PROCEDURE — 96372 THER/PROPH/DIAG INJ SC/IM: CPT

## 2024-07-24 PROCEDURE — 36415 COLL VENOUS BLD VENIPUNCTURE: CPT

## 2024-07-24 PROCEDURE — 96376 TX/PRO/DX INJ SAME DRUG ADON: CPT

## 2024-07-24 PROCEDURE — 82962 GLUCOSE BLOOD TEST: CPT

## 2024-07-24 PROCEDURE — 85025 COMPLETE CBC W/AUTO DIFF WBC: CPT

## 2024-07-24 RX ADMIN — MONTELUKAST SODIUM 10 MG: 10 TABLET, FILM COATED ORAL at 08:49

## 2024-07-24 RX ADMIN — SODIUM CHLORIDE, PRESERVATIVE FREE 10 ML: 5 INJECTION INTRAVENOUS at 08:50

## 2024-07-24 RX ADMIN — ACETAMINOPHEN 650 MG: 325 TABLET ORAL at 16:07

## 2024-07-24 RX ADMIN — ENOXAPARIN SODIUM 30 MG: 100 INJECTION SUBCUTANEOUS at 08:49

## 2024-07-24 RX ADMIN — DOXYCYCLINE HYCLATE 100 MG: 100 CAPSULE ORAL at 08:49

## 2024-07-24 RX ADMIN — DOCUSATE SODIUM 100 MG: 100 CAPSULE, LIQUID FILLED ORAL at 20:56

## 2024-07-24 RX ADMIN — OXYCODONE HYDROCHLORIDE 10 MG: 5 TABLET ORAL at 17:03

## 2024-07-24 RX ADMIN — CYANOCOBALAMIN TAB 1000 MCG 1000 MCG: 1000 TAB at 08:50

## 2024-07-24 RX ADMIN — OXYCODONE HYDROCHLORIDE 10 MG: 5 TABLET ORAL at 12:53

## 2024-07-24 RX ADMIN — LEVOTHYROXINE SODIUM 125 MCG: 25 TABLET ORAL at 06:03

## 2024-07-24 RX ADMIN — OXYCODONE HYDROCHLORIDE 10 MG: 5 TABLET ORAL at 00:06

## 2024-07-24 RX ADMIN — ENOXAPARIN SODIUM 30 MG: 100 INJECTION SUBCUTANEOUS at 20:56

## 2024-07-24 RX ADMIN — CLOZAPINE 100 MG: 100 TABLET ORAL at 20:55

## 2024-07-24 RX ADMIN — ALPRAZOLAM 0.75 MG: 0.25 TABLET ORAL at 20:56

## 2024-07-24 RX ADMIN — ONDANSETRON 4 MG: 2 INJECTION INTRAMUSCULAR; INTRAVENOUS at 16:07

## 2024-07-24 RX ADMIN — FAMOTIDINE 40 MG: 20 TABLET ORAL at 08:50

## 2024-07-24 RX ADMIN — TIZANIDINE 4 MG: 4 TABLET ORAL at 16:08

## 2024-07-24 RX ADMIN — ALPRAZOLAM 0.75 MG: 0.25 TABLET ORAL at 11:38

## 2024-07-24 RX ADMIN — TIZANIDINE 4 MG: 4 TABLET ORAL at 08:50

## 2024-07-24 RX ADMIN — FUROSEMIDE 20 MG: 20 TABLET ORAL at 16:08

## 2024-07-24 RX ADMIN — SODIUM CHLORIDE, PRESERVATIVE FREE 10 ML: 5 INJECTION INTRAVENOUS at 20:57

## 2024-07-24 RX ADMIN — SODIUM CHLORIDE, PRESERVATIVE FREE 10 ML: 5 INJECTION INTRAVENOUS at 20:56

## 2024-07-24 RX ADMIN — OXYCODONE HYDROCHLORIDE 10 MG: 5 TABLET ORAL at 20:56

## 2024-07-24 RX ADMIN — Medication 1 TABLET: at 08:50

## 2024-07-24 RX ADMIN — DOXYCYCLINE HYCLATE 100 MG: 100 CAPSULE ORAL at 20:56

## 2024-07-24 RX ADMIN — FUROSEMIDE 20 MG: 20 TABLET ORAL at 08:50

## 2024-07-24 RX ADMIN — SODIUM CHLORIDE, PRESERVATIVE FREE 10 ML: 5 INJECTION INTRAVENOUS at 08:51

## 2024-07-24 RX ADMIN — ALPRAZOLAM 0.75 MG: 0.25 TABLET ORAL at 08:49

## 2024-07-24 RX ADMIN — OXYCODONE HYDROCHLORIDE 10 MG: 5 TABLET ORAL at 04:04

## 2024-07-24 RX ADMIN — PANTOPRAZOLE SODIUM 40 MG: 40 TABLET, DELAYED RELEASE ORAL at 06:03

## 2024-07-24 RX ADMIN — OXYCODONE HYDROCHLORIDE 10 MG: 5 TABLET ORAL at 08:49

## 2024-07-24 RX ADMIN — ASPIRIN 81 MG: 81 TABLET, COATED ORAL at 08:49

## 2024-07-24 RX ADMIN — TIZANIDINE 4 MG: 4 TABLET ORAL at 20:56

## 2024-07-24 RX ADMIN — ALPRAZOLAM 0.75 MG: 0.25 TABLET ORAL at 16:08

## 2024-07-24 RX ADMIN — DOCUSATE SODIUM 100 MG: 100 CAPSULE, LIQUID FILLED ORAL at 08:50

## 2024-07-24 ASSESSMENT — PAIN DESCRIPTION - LOCATION
LOCATION: FOOT

## 2024-07-24 ASSESSMENT — PAIN SCALES - GENERAL
PAINLEVEL_OUTOF10: 10
PAINLEVEL_OUTOF10: 8
PAINLEVEL_OUTOF10: 10
PAINLEVEL_OUTOF10: 10
PAINLEVEL_OUTOF10: 8
PAINLEVEL_OUTOF10: 9

## 2024-07-24 ASSESSMENT — PAIN DESCRIPTION - ORIENTATION
ORIENTATION: RIGHT

## 2024-07-24 ASSESSMENT — PAIN DESCRIPTION - DESCRIPTORS
DESCRIPTORS: ACHING;DISCOMFORT

## 2024-07-24 ASSESSMENT — PAIN SCALES - WONG BAKER
WONGBAKER_NUMERICALRESPONSE: HURTS WHOLE LOT
WONGBAKER_NUMERICALRESPONSE: NO HURT
WONGBAKER_NUMERICALRESPONSE: NO HURT

## 2024-07-24 NOTE — PATIENT CARE CONFERENCE
P Quality Flow/Interdisciplinary Rounds Progress Note        Quality Flow Rounds held on July 24, 2024    Disciplines Attending:  Bedside Nurse, , , and Nursing Unit Leadership    Ronny Uribe was admitted on 7/22/2024  4:55 AM    Anticipated Discharge Date:       Disposition:    Rei Score:  Rei Scale Score: 19    Readmission Risk              Risk of Unplanned Readmission:  0           Discussed patient goal for the day, patient clinical progression, and barriers to discharge.  The following Goal(s) of the Day/Commitment(s) have been identified:  dc planning      Usha Galdamez RN  July 24, 2024

## 2024-07-24 NOTE — PLAN OF CARE
Problem: Chronic Conditions and Co-morbidities  Goal: Patient's chronic conditions and co-morbidity symptoms are monitored and maintained or improved  7/23/2024 2346 by Brooklyn Vail RN  Outcome: Progressing  7/23/2024 1559 by Usman Roblero RN  Outcome: Progressing     Problem: Discharge Planning  Goal: Discharge to home or other facility with appropriate resources  7/23/2024 2346 by Brooklyn Vail RN  Outcome: Progressing  7/23/2024 1559 by Usman Roblero RN  Outcome: Progressing     Problem: Pain  Goal: Verbalizes/displays adequate comfort level or baseline comfort level  7/23/2024 2346 by Brooklyn Vail RN  Outcome: Progressing  7/23/2024 1559 by Usman Roblero RN  Outcome: Progressing     Problem: Safety - Adult  Goal: Free from fall injury  7/23/2024 2346 by Brooklyn Vail RN  Outcome: Progressing  7/23/2024 1559 by Usman Roblero RN  Outcome: Progressing     Problem: ABCDS Injury Assessment  Goal: Absence of physical injury  7/23/2024 2346 by Brooklyn Vail RN  Outcome: Progressing  7/23/2024 1559 by Usman Roblero RN  Outcome: Progressing

## 2024-07-24 NOTE — CARE COORDINATION
Social Work discharge planning  ZENON left message for [facility] in [facility] [phone] to see if bed opened for pt.   ZENON called [facility] [phone] and Gladis advised they take pt's insurance and asked ZENON to refax referral. ZENON faxed to [phone] now. Asked Gladis to call ZENON back when she gets fax this am.  Also spoke to pt/father to update on above. They are calling pt's insurance to see if they can get a list in [facility] of in network snfs.  Electronically signed by ИВАН Loomis on 7/24/2024 at 11:00 AM     Addendum  Pt/father spoke to insurance, and have chosen:  5. [facility] [facility] [phone]. Spoke to Isabelle in admissions, who said they do not have bed availability this week.  6. [facility] [phone]. LVM for admits Mimi Joy. Await her call back.  Electronically signed by ИВАН Loomis on 7/24/2024 at 12:01 PM     Addendum  ZENON called [facility] again and left message for Socorro in admits. Faxed her referral to fax [phone].  Electronically signed by ИВАН Loomis on 7/24/2024 at 2:54 PM

## 2024-07-25 LAB
ALBUMIN SERPL-MCNC: 3.5 G/DL (ref 3.5–5.2)
ALP SERPL-CCNC: 89 U/L (ref 35–104)
ALT SERPL-CCNC: 33 U/L (ref 0–32)
ANION GAP SERPL CALCULATED.3IONS-SCNC: 8 MMOL/L (ref 7–16)
AST SERPL-CCNC: 18 U/L (ref 0–31)
BASOPHILS # BLD: 0.02 K/UL (ref 0–0.2)
BASOPHILS NFR BLD: 0 % (ref 0–2)
BILIRUB SERPL-MCNC: 0.3 MG/DL (ref 0–1.2)
BUN SERPL-MCNC: 13 MG/DL (ref 6–20)
CALCIUM SERPL-MCNC: 9.1 MG/DL (ref 8.6–10.2)
CHLORIDE SERPL-SCNC: 104 MMOL/L (ref 98–107)
CO2 SERPL-SCNC: 27 MMOL/L (ref 22–29)
CREAT SERPL-MCNC: 0.7 MG/DL (ref 0.5–1)
EOSINOPHIL # BLD: 0.29 K/UL (ref 0.05–0.5)
EOSINOPHILS RELATIVE PERCENT: 4 % (ref 0–6)
ERYTHROCYTE [DISTWIDTH] IN BLOOD BY AUTOMATED COUNT: 12.5 % (ref 11.5–15)
GFR, ESTIMATED: >90 ML/MIN/1.73M2
GLUCOSE BLD-MCNC: 104 MG/DL (ref 74–99)
GLUCOSE BLD-MCNC: 115 MG/DL (ref 74–99)
GLUCOSE BLD-MCNC: 69 MG/DL (ref 74–99)
GLUCOSE BLD-MCNC: 78 MG/DL (ref 74–99)
GLUCOSE BLD-MCNC: 96 MG/DL (ref 74–99)
GLUCOSE SERPL-MCNC: 103 MG/DL (ref 74–99)
HCT VFR BLD AUTO: 32.3 % (ref 34–48)
HGB BLD-MCNC: 10.2 G/DL (ref 11.5–15.5)
IMM GRANULOCYTES # BLD AUTO: 0.03 K/UL (ref 0–0.58)
IMM GRANULOCYTES NFR BLD: 1 % (ref 0–5)
LYMPHOCYTES NFR BLD: 1.92 K/UL (ref 1.5–4)
LYMPHOCYTES RELATIVE PERCENT: 29 % (ref 20–42)
MCH RBC QN AUTO: 29.2 PG (ref 26–35)
MCHC RBC AUTO-ENTMCNC: 31.6 G/DL (ref 32–34.5)
MCV RBC AUTO: 92.6 FL (ref 80–99.9)
MONOCYTES NFR BLD: 0.81 K/UL (ref 0.1–0.95)
MONOCYTES NFR BLD: 12 % (ref 2–12)
NEUTROPHILS NFR BLD: 53 % (ref 43–80)
NEUTS SEG NFR BLD: 3.53 K/UL (ref 1.8–7.3)
PLATELET # BLD AUTO: 313 K/UL (ref 130–450)
PMV BLD AUTO: 9.8 FL (ref 7–12)
POTASSIUM SERPL-SCNC: 3.9 MMOL/L (ref 3.5–5)
PROT SERPL-MCNC: 6.7 G/DL (ref 6.4–8.3)
RBC # BLD AUTO: 3.49 M/UL (ref 3.5–5.5)
SODIUM SERPL-SCNC: 139 MMOL/L (ref 132–146)
WBC OTHER # BLD: 6.6 K/UL (ref 4.5–11.5)

## 2024-07-25 PROCEDURE — 97530 THERAPEUTIC ACTIVITIES: CPT

## 2024-07-25 PROCEDURE — 6370000000 HC RX 637 (ALT 250 FOR IP): Performed by: STUDENT IN AN ORGANIZED HEALTH CARE EDUCATION/TRAINING PROGRAM

## 2024-07-25 PROCEDURE — G0378 HOSPITAL OBSERVATION PER HR: HCPCS

## 2024-07-25 PROCEDURE — 2580000003 HC RX 258

## 2024-07-25 PROCEDURE — 36415 COLL VENOUS BLD VENIPUNCTURE: CPT

## 2024-07-25 PROCEDURE — 82962 GLUCOSE BLOOD TEST: CPT

## 2024-07-25 PROCEDURE — 99233 SBSQ HOSP IP/OBS HIGH 50: CPT | Performed by: STUDENT IN AN ORGANIZED HEALTH CARE EDUCATION/TRAINING PROGRAM

## 2024-07-25 PROCEDURE — 2580000003 HC RX 258: Performed by: INTERNAL MEDICINE

## 2024-07-25 PROCEDURE — 96372 THER/PROPH/DIAG INJ SC/IM: CPT

## 2024-07-25 PROCEDURE — 80053 COMPREHEN METABOLIC PANEL: CPT

## 2024-07-25 PROCEDURE — 6370000000 HC RX 637 (ALT 250 FOR IP): Performed by: INTERNAL MEDICINE

## 2024-07-25 PROCEDURE — 6370000000 HC RX 637 (ALT 250 FOR IP)

## 2024-07-25 PROCEDURE — 6360000002 HC RX W HCPCS: Performed by: INTERNAL MEDICINE

## 2024-07-25 PROCEDURE — 85025 COMPLETE CBC W/AUTO DIFF WBC: CPT

## 2024-07-25 RX ORDER — POLYETHYLENE GLYCOL 3350 17 G/17G
17 POWDER, FOR SOLUTION ORAL DAILY
Status: DISCONTINUED | OUTPATIENT
Start: 2024-07-25 | End: 2024-07-29 | Stop reason: HOSPADM

## 2024-07-25 RX ADMIN — ASPIRIN 81 MG: 81 TABLET, COATED ORAL at 08:25

## 2024-07-25 RX ADMIN — CLOZAPINE 100 MG: 100 TABLET ORAL at 20:22

## 2024-07-25 RX ADMIN — DOCUSATE SODIUM 100 MG: 100 CAPSULE, LIQUID FILLED ORAL at 08:25

## 2024-07-25 RX ADMIN — FUROSEMIDE 20 MG: 20 TABLET ORAL at 16:12

## 2024-07-25 RX ADMIN — OXYCODONE HYDROCHLORIDE 10 MG: 5 TABLET ORAL at 08:26

## 2024-07-25 RX ADMIN — FUROSEMIDE 20 MG: 20 TABLET ORAL at 08:25

## 2024-07-25 RX ADMIN — ENOXAPARIN SODIUM 30 MG: 100 INJECTION SUBCUTANEOUS at 20:23

## 2024-07-25 RX ADMIN — POLYETHYLENE GLYCOL 3350 17 G: 17 POWDER, FOR SOLUTION ORAL at 11:49

## 2024-07-25 RX ADMIN — SODIUM CHLORIDE, PRESERVATIVE FREE 10 ML: 5 INJECTION INTRAVENOUS at 08:28

## 2024-07-25 RX ADMIN — OXYCODONE HYDROCHLORIDE 10 MG: 5 TABLET ORAL at 17:00

## 2024-07-25 RX ADMIN — ALPRAZOLAM 0.75 MG: 0.25 TABLET ORAL at 08:25

## 2024-07-25 RX ADMIN — ALPRAZOLAM 0.75 MG: 0.25 TABLET ORAL at 16:12

## 2024-07-25 RX ADMIN — FAMOTIDINE 40 MG: 20 TABLET ORAL at 08:25

## 2024-07-25 RX ADMIN — ALPRAZOLAM 0.75 MG: 0.25 TABLET ORAL at 12:48

## 2024-07-25 RX ADMIN — OXYCODONE HYDROCHLORIDE 10 MG: 5 TABLET ORAL at 01:01

## 2024-07-25 RX ADMIN — ENOXAPARIN SODIUM 30 MG: 100 INJECTION SUBCUTANEOUS at 08:28

## 2024-07-25 RX ADMIN — OXYCODONE HYDROCHLORIDE 10 MG: 5 TABLET ORAL at 12:49

## 2024-07-25 RX ADMIN — DOCUSATE SODIUM 100 MG: 100 CAPSULE, LIQUID FILLED ORAL at 20:22

## 2024-07-25 RX ADMIN — ALPRAZOLAM 0.75 MG: 0.25 TABLET ORAL at 20:22

## 2024-07-25 RX ADMIN — SODIUM CHLORIDE, PRESERVATIVE FREE 10 ML: 5 INJECTION INTRAVENOUS at 20:23

## 2024-07-25 RX ADMIN — SODIUM CHLORIDE, PRESERVATIVE FREE 10 ML: 5 INJECTION INTRAVENOUS at 08:25

## 2024-07-25 RX ADMIN — TIZANIDINE 4 MG: 4 TABLET ORAL at 20:22

## 2024-07-25 RX ADMIN — OXYCODONE HYDROCHLORIDE 10 MG: 5 TABLET ORAL at 05:19

## 2024-07-25 RX ADMIN — DOXYCYCLINE HYCLATE 100 MG: 100 CAPSULE ORAL at 20:23

## 2024-07-25 RX ADMIN — LEVOTHYROXINE SODIUM 125 MCG: 25 TABLET ORAL at 06:20

## 2024-07-25 RX ADMIN — CYANOCOBALAMIN TAB 1000 MCG 1000 MCG: 1000 TAB at 08:26

## 2024-07-25 RX ADMIN — TIZANIDINE 4 MG: 4 TABLET ORAL at 16:12

## 2024-07-25 RX ADMIN — OXYCODONE HYDROCHLORIDE 10 MG: 5 TABLET ORAL at 20:22

## 2024-07-25 RX ADMIN — Medication 1 TABLET: at 08:25

## 2024-07-25 RX ADMIN — MONTELUKAST SODIUM 10 MG: 10 TABLET, FILM COATED ORAL at 08:25

## 2024-07-25 RX ADMIN — TIZANIDINE 4 MG: 4 TABLET ORAL at 08:25

## 2024-07-25 RX ADMIN — PANTOPRAZOLE SODIUM 40 MG: 40 TABLET, DELAYED RELEASE ORAL at 06:20

## 2024-07-25 RX ADMIN — DOXYCYCLINE HYCLATE 100 MG: 100 CAPSULE ORAL at 08:25

## 2024-07-25 ASSESSMENT — PAIN SCALES - GENERAL
PAINLEVEL_OUTOF10: 8
PAINLEVEL_OUTOF10: 10
PAINLEVEL_OUTOF10: 10
PAINLEVEL_OUTOF10: 8
PAINLEVEL_OUTOF10: 10
PAINLEVEL_OUTOF10: 10

## 2024-07-25 ASSESSMENT — PAIN DESCRIPTION - DESCRIPTORS
DESCRIPTORS: SHARP;THROBBING
DESCRIPTORS: BURNING;STABBING;SHARP
DESCRIPTORS: ACHING;SORE
DESCRIPTORS: ACHING

## 2024-07-25 ASSESSMENT — PAIN SCALES - WONG BAKER
WONGBAKER_NUMERICALRESPONSE: NO HURT
WONGBAKER_NUMERICALRESPONSE: NO HURT

## 2024-07-25 ASSESSMENT — PAIN DESCRIPTION - LOCATION
LOCATION: FOOT

## 2024-07-25 ASSESSMENT — PAIN DESCRIPTION - ORIENTATION
ORIENTATION: RIGHT

## 2024-07-25 ASSESSMENT — PAIN - FUNCTIONAL ASSESSMENT
PAIN_FUNCTIONAL_ASSESSMENT: PREVENTS OR INTERFERES SOME ACTIVE ACTIVITIES AND ADLS
PAIN_FUNCTIONAL_ASSESSMENT: PREVENTS OR INTERFERES SOME ACTIVE ACTIVITIES AND ADLS

## 2024-07-25 NOTE — PATIENT CARE CONFERENCE
[facility] Quality Flow/Interdisciplinary Rounds Progress Note        Quality Flow Rounds held on July 25, 2024    Disciplines Attending:  Bedside Nurse, , , and Nursing Unit Leadership    Ronny Uribe was admitted on 7/22/2024  4:55 AM    Anticipated Discharge Date:       Disposition:    Rei Score:  Rei Scale Score: 19    Readmission Risk              Risk of Unplanned Readmission:  0           Discussed patient goal for the day, patient clinical progression, and barriers to discharge.  The following Goal(s) of the Day/Commitment(s) have been identified:  Discharge - Obtain Order await placement      Elena Mcwilliams RN  July 25, 2024

## 2024-07-25 NOTE — CARE COORDINATION
.Social Work discharge planning  Spoke to Bethanie at [facility] and emailed her referral, as she said they have not received faxes. [email or web address]  Electronically signed by ИВАН Loomis on 7/25/2024 at 8:24 AM     Addendum   SW called [facility] again Socorro [phone] in admits. Faxed her referral to fax [phone] yesterday. Left another voicemail this am.  Updated pt that if both don't get back  to  this am, we need more snf choices. Notified PT OT of need for updates today for insurance precert.  Electronically signed by ИВАН Loomis on 7/25/2024 at 8:34 AM     Addendum:  Emailed Socorro at [facility]. [email or web address]  Electronically signed by ИВАН Loomis on 7/25/2024 at 10:29 AM     Addendum - No bed open at [facility] per Socorro.   Pt/father have chosen more snfs:  7. [facility] [phone]. No answer  [facility]. [facility] [phone]. Faxed referral to [facility] fax [phone].  Electronically signed by ИВАН Loomis on 7/25/2024 at 12:58 PM     Addendum - SW called [facility] again. Spoke to Dank in admits, who advised they have no anticipated open beds and they have a rehab wait list.   Bethanie from [facility] advised  \"Our team has reviewed the referral and unfortunately, we are unable to accept/ accommodate their needs\"  PLAN: Await call back from [facility].  Electronically signed by ИВАН Loomis on 7/25/2024 at 1:25 PM

## 2024-07-26 LAB
GLUCOSE BLD-MCNC: 107 MG/DL (ref 74–99)
GLUCOSE BLD-MCNC: 108 MG/DL (ref 74–99)
GLUCOSE BLD-MCNC: 141 MG/DL (ref 74–99)
GLUCOSE BLD-MCNC: 96 MG/DL (ref 74–99)

## 2024-07-26 PROCEDURE — 6360000002 HC RX W HCPCS: Performed by: INTERNAL MEDICINE

## 2024-07-26 PROCEDURE — 6370000000 HC RX 637 (ALT 250 FOR IP)

## 2024-07-26 PROCEDURE — 2580000003 HC RX 258

## 2024-07-26 PROCEDURE — 6370000000 HC RX 637 (ALT 250 FOR IP): Performed by: STUDENT IN AN ORGANIZED HEALTH CARE EDUCATION/TRAINING PROGRAM

## 2024-07-26 PROCEDURE — 6370000000 HC RX 637 (ALT 250 FOR IP): Performed by: INTERNAL MEDICINE

## 2024-07-26 PROCEDURE — G0378 HOSPITAL OBSERVATION PER HR: HCPCS

## 2024-07-26 PROCEDURE — 99233 SBSQ HOSP IP/OBS HIGH 50: CPT | Performed by: STUDENT IN AN ORGANIZED HEALTH CARE EDUCATION/TRAINING PROGRAM

## 2024-07-26 PROCEDURE — 82962 GLUCOSE BLOOD TEST: CPT

## 2024-07-26 PROCEDURE — 2580000003 HC RX 258: Performed by: INTERNAL MEDICINE

## 2024-07-26 PROCEDURE — 96372 THER/PROPH/DIAG INJ SC/IM: CPT

## 2024-07-26 RX ADMIN — ALPRAZOLAM 0.75 MG: 0.25 TABLET ORAL at 08:10

## 2024-07-26 RX ADMIN — PANTOPRAZOLE SODIUM 40 MG: 40 TABLET, DELAYED RELEASE ORAL at 06:32

## 2024-07-26 RX ADMIN — TIZANIDINE 4 MG: 4 TABLET ORAL at 16:23

## 2024-07-26 RX ADMIN — DOCUSATE SODIUM 100 MG: 100 CAPSULE, LIQUID FILLED ORAL at 08:10

## 2024-07-26 RX ADMIN — ACETAMINOPHEN 650 MG: 325 TABLET ORAL at 18:22

## 2024-07-26 RX ADMIN — TIZANIDINE 4 MG: 4 TABLET ORAL at 08:10

## 2024-07-26 RX ADMIN — CLOZAPINE 100 MG: 100 TABLET ORAL at 22:40

## 2024-07-26 RX ADMIN — ENOXAPARIN SODIUM 30 MG: 100 INJECTION SUBCUTANEOUS at 08:11

## 2024-07-26 RX ADMIN — DOCUSATE SODIUM 100 MG: 100 CAPSULE, LIQUID FILLED ORAL at 21:21

## 2024-07-26 RX ADMIN — FAMOTIDINE 40 MG: 20 TABLET ORAL at 08:10

## 2024-07-26 RX ADMIN — DOXYCYCLINE HYCLATE 100 MG: 100 CAPSULE ORAL at 21:21

## 2024-07-26 RX ADMIN — ENOXAPARIN SODIUM 30 MG: 100 INJECTION SUBCUTANEOUS at 21:24

## 2024-07-26 RX ADMIN — ALPRAZOLAM 0.75 MG: 0.25 TABLET ORAL at 16:22

## 2024-07-26 RX ADMIN — OXYCODONE HYDROCHLORIDE 10 MG: 5 TABLET ORAL at 04:30

## 2024-07-26 RX ADMIN — TIZANIDINE 4 MG: 4 TABLET ORAL at 21:21

## 2024-07-26 RX ADMIN — ALPRAZOLAM 0.75 MG: 0.25 TABLET ORAL at 12:16

## 2024-07-26 RX ADMIN — OXYCODONE HYDROCHLORIDE 10 MG: 5 TABLET ORAL at 12:15

## 2024-07-26 RX ADMIN — CYANOCOBALAMIN TAB 1000 MCG 1000 MCG: 1000 TAB at 08:10

## 2024-07-26 RX ADMIN — OXYCODONE HYDROCHLORIDE 10 MG: 5 TABLET ORAL at 21:21

## 2024-07-26 RX ADMIN — OXYCODONE HYDROCHLORIDE 10 MG: 5 TABLET ORAL at 16:22

## 2024-07-26 RX ADMIN — FUROSEMIDE 20 MG: 20 TABLET ORAL at 16:30

## 2024-07-26 RX ADMIN — MONTELUKAST SODIUM 10 MG: 10 TABLET, FILM COATED ORAL at 08:10

## 2024-07-26 RX ADMIN — OXYCODONE HYDROCHLORIDE 10 MG: 5 TABLET ORAL at 08:11

## 2024-07-26 RX ADMIN — SODIUM CHLORIDE, PRESERVATIVE FREE 10 ML: 5 INJECTION INTRAVENOUS at 08:12

## 2024-07-26 RX ADMIN — SODIUM CHLORIDE, PRESERVATIVE FREE 10 ML: 5 INJECTION INTRAVENOUS at 21:36

## 2024-07-26 RX ADMIN — ASPIRIN 81 MG: 81 TABLET, COATED ORAL at 08:10

## 2024-07-26 RX ADMIN — FUROSEMIDE 20 MG: 20 TABLET ORAL at 08:11

## 2024-07-26 RX ADMIN — LEVOTHYROXINE SODIUM 125 MCG: 25 TABLET ORAL at 06:32

## 2024-07-26 RX ADMIN — Medication 1 TABLET: at 08:10

## 2024-07-26 RX ADMIN — POLYETHYLENE GLYCOL 3350 17 G: 17 POWDER, FOR SOLUTION ORAL at 11:47

## 2024-07-26 RX ADMIN — OXYCODONE HYDROCHLORIDE 10 MG: 5 TABLET ORAL at 00:26

## 2024-07-26 RX ADMIN — ALPRAZOLAM 0.75 MG: 0.25 TABLET ORAL at 21:24

## 2024-07-26 RX ADMIN — DOXYCYCLINE HYCLATE 100 MG: 100 CAPSULE ORAL at 08:10

## 2024-07-26 RX ADMIN — SODIUM CHLORIDE, PRESERVATIVE FREE 10 ML: 5 INJECTION INTRAVENOUS at 08:11

## 2024-07-26 ASSESSMENT — PAIN SCALES - GENERAL
PAINLEVEL_OUTOF10: 8
PAINLEVEL_OUTOF10: 4
PAINLEVEL_OUTOF10: 9
PAINLEVEL_OUTOF10: 8

## 2024-07-26 ASSESSMENT — PAIN DESCRIPTION - DESCRIPTORS
DESCRIPTORS: DISCOMFORT
DESCRIPTORS: THROBBING
DESCRIPTORS: THROBBING;SHARP
DESCRIPTORS: DISCOMFORT;SORE;TENDER
DESCRIPTORS: DISCOMFORT;SORE

## 2024-07-26 ASSESSMENT — PAIN SCALES - WONG BAKER
WONGBAKER_NUMERICALRESPONSE: NO HURT

## 2024-07-26 ASSESSMENT — PAIN DESCRIPTION - LOCATION
LOCATION: FOOT
LOCATION: LEG;FOOT
LOCATION: LEG
LOCATION: FOOT

## 2024-07-26 ASSESSMENT — PAIN - FUNCTIONAL ASSESSMENT
PAIN_FUNCTIONAL_ASSESSMENT: PREVENTS OR INTERFERES SOME ACTIVE ACTIVITIES AND ADLS
PAIN_FUNCTIONAL_ASSESSMENT: ACTIVITIES ARE NOT PREVENTED
PAIN_FUNCTIONAL_ASSESSMENT: PREVENTS OR INTERFERES SOME ACTIVE ACTIVITIES AND ADLS

## 2024-07-26 ASSESSMENT — PAIN DESCRIPTION - ORIENTATION
ORIENTATION: RIGHT

## 2024-07-26 NOTE — PATIENT CARE CONFERENCE
P Quality Flow/Interdisciplinary Rounds Progress Note        Quality Flow Rounds held on July 26, 2024    Disciplines Attending:  Bedside Nurse, , , and Nursing Unit Leadership    Ronny Uribe was admitted on 7/22/2024  4:55 AM    Anticipated Discharge Date:       Disposition:    Rei Score:  Rei Scale Score: 18    Readmission Risk              Risk of Unplanned Readmission:  0           Discussed patient goal for the day, patient clinical progression, and barriers to discharge.  The following Goal(s) of the Day/Commitment(s) have been identified:  Discharge - Obtain Order planning      Elena Mcwilliams RN  July 26, 2024

## 2024-07-26 NOTE — DISCHARGE INSTR - COC
Continuity of Care Form    Patient Name: Ronny Riley   :  1980  MRN:  [id number]    Admit date:  2024  Discharge date:  2024    Code Status Order: Full Code   Advance Directives:   Advance Care Flowsheet Documentation       Date/Time Healthcare Directive Type of Healthcare Directive Copy in Chart Healthcare Agent Appointed Healthcare Agent's Name Healthcare Agent's Phone Number    24 0550 Yes, patient has an advance directive for healthcare treatment Durable power of  for health care No, copy requested from family -- -- --            Admitting Physician:  Beatriz Elder DO  PCP: Faith Goodwin DO    Discharging Nurse: Annabelle BENITEZ  Discharging Hospital Unit/Room#: 0513/0513-A  Discharging Unit Phone Number: [phone]    Emergency Contact:   Extended Emergency Contact Information  Primary Emergency Contact: eryn riley  Home Phone: [phone]  Relation: Parent  Secondary Emergency Contact: ousmane riley  Home Phone: [phone]  Relation: Parent    Past Surgical History:  Past Surgical History:   Procedure Laterality Date    ANKLE SURGERY Left 2009    ARTHRODESIS Left 3/4/2022    MEDIAL COLUMN FUSION LAPIDUS BUNIONECTOMY TENOTOMY AND CAPSULOTOMY 2,3,4,5 LEFT FOOT performed by Cj Nguyen DPM at [facility]    CARPAL TUNNEL RELEASE Left      SECTION  2004    ENDOMETRIAL ABLATION      [facility]    FOOT FRACTURE SURGERY Left 2019    REMOVAL PAINFUL FIXATION THIRD TOE LEFT FOOT performed by Travis Corea DPM at [facility] OR    FOOT FUSION       x3     FOOT SURGERY Right 2024    PERCUTANEOUS CALCANEAL OSTEOTOMY, RICK CALCANEAL OSTEOTOMY, NAVICULAR CUNEIFORM ARTHRODESIS, FLEXOR HALLICUS LONGUS TENOTOMY, MODIFIED BROSTROM PROCEDURE, MEDIAL COLUMN FUSION AND LAPIDUS, PERONEAL BREVIS TENDON REPAIR performed by Cj Nguyen DPM at [facility] OR    GASTRIC BYPASS SURGERY      GASTROCNEMIUS RECESSION Left 2021    LEFT CALCANEAL OSTEOTOMY , REMOVAL

## 2024-07-26 NOTE — CARE COORDINATION
Social Work discharge planning   SW called [facility] at [facility] [phone]. They have a semi private. Discussed with pt, and she is agreeable to semi private. Advised pt/father Tremayne that if insurance denies, private pay is option at icf level [facility] with snf is submitting precert this afternoon. Notified PT OT that pt will need updated PT OT Monday 7/29. Will check with snf on precert Monday 7/29. Pt can not go to snf without precert approval. N17 started in [facility]. HENS 7000 done. Envelope in folder. Likely transport via family car.   Electronically signed by ИВАН Loomis on 7/26/2024 at 1:19 PM

## 2024-07-27 LAB
GLUCOSE BLD-MCNC: 113 MG/DL (ref 74–99)
GLUCOSE BLD-MCNC: 113 MG/DL (ref 74–99)
GLUCOSE BLD-MCNC: 134 MG/DL (ref 74–99)
GLUCOSE BLD-MCNC: 136 MG/DL (ref 74–99)

## 2024-07-27 PROCEDURE — 2580000003 HC RX 258: Performed by: INTERNAL MEDICINE

## 2024-07-27 PROCEDURE — 6370000000 HC RX 637 (ALT 250 FOR IP)

## 2024-07-27 PROCEDURE — 99232 SBSQ HOSP IP/OBS MODERATE 35: CPT | Performed by: INTERNAL MEDICINE

## 2024-07-27 PROCEDURE — 6370000000 HC RX 637 (ALT 250 FOR IP): Performed by: INTERNAL MEDICINE

## 2024-07-27 PROCEDURE — 6360000002 HC RX W HCPCS

## 2024-07-27 PROCEDURE — 6360000002 HC RX W HCPCS: Performed by: INTERNAL MEDICINE

## 2024-07-27 PROCEDURE — 82962 GLUCOSE BLOOD TEST: CPT

## 2024-07-27 PROCEDURE — 96376 TX/PRO/DX INJ SAME DRUG ADON: CPT

## 2024-07-27 PROCEDURE — 6370000000 HC RX 637 (ALT 250 FOR IP): Performed by: STUDENT IN AN ORGANIZED HEALTH CARE EDUCATION/TRAINING PROGRAM

## 2024-07-27 PROCEDURE — G0378 HOSPITAL OBSERVATION PER HR: HCPCS

## 2024-07-27 PROCEDURE — 96372 THER/PROPH/DIAG INJ SC/IM: CPT

## 2024-07-27 RX ADMIN — DOXYCYCLINE HYCLATE 100 MG: 100 CAPSULE ORAL at 20:45

## 2024-07-27 RX ADMIN — ENOXAPARIN SODIUM 30 MG: 100 INJECTION SUBCUTANEOUS at 20:48

## 2024-07-27 RX ADMIN — ALPRAZOLAM 0.75 MG: 0.25 TABLET ORAL at 20:45

## 2024-07-27 RX ADMIN — DOCUSATE SODIUM 100 MG: 100 CAPSULE, LIQUID FILLED ORAL at 20:46

## 2024-07-27 RX ADMIN — FUROSEMIDE 20 MG: 20 TABLET ORAL at 09:44

## 2024-07-27 RX ADMIN — TIZANIDINE 4 MG: 4 TABLET ORAL at 09:44

## 2024-07-27 RX ADMIN — ALPRAZOLAM 0.75 MG: 0.25 TABLET ORAL at 13:42

## 2024-07-27 RX ADMIN — Medication 1 TABLET: at 09:43

## 2024-07-27 RX ADMIN — OXYCODONE HYDROCHLORIDE 10 MG: 5 TABLET ORAL at 21:52

## 2024-07-27 RX ADMIN — DOCUSATE SODIUM 100 MG: 100 CAPSULE, LIQUID FILLED ORAL at 09:44

## 2024-07-27 RX ADMIN — TIZANIDINE 4 MG: 4 TABLET ORAL at 20:46

## 2024-07-27 RX ADMIN — ALPRAZOLAM 0.75 MG: 0.25 TABLET ORAL at 17:36

## 2024-07-27 RX ADMIN — ONDANSETRON 4 MG: 2 INJECTION INTRAMUSCULAR; INTRAVENOUS at 15:27

## 2024-07-27 RX ADMIN — MONTELUKAST SODIUM 10 MG: 10 TABLET, FILM COATED ORAL at 09:44

## 2024-07-27 RX ADMIN — OXYCODONE HYDROCHLORIDE 10 MG: 5 TABLET ORAL at 09:43

## 2024-07-27 RX ADMIN — ENOXAPARIN SODIUM 30 MG: 100 INJECTION SUBCUTANEOUS at 09:45

## 2024-07-27 RX ADMIN — ALPRAZOLAM 0.75 MG: 0.25 TABLET ORAL at 09:43

## 2024-07-27 RX ADMIN — TIZANIDINE 4 MG: 4 TABLET ORAL at 16:14

## 2024-07-27 RX ADMIN — POLYETHYLENE GLYCOL 3350 17 G: 17 POWDER, FOR SOLUTION ORAL at 09:44

## 2024-07-27 RX ADMIN — LEVOTHYROXINE SODIUM 125 MCG: 25 TABLET ORAL at 06:18

## 2024-07-27 RX ADMIN — SODIUM CHLORIDE, PRESERVATIVE FREE 10 ML: 5 INJECTION INTRAVENOUS at 20:47

## 2024-07-27 RX ADMIN — CLOZAPINE 100 MG: 100 TABLET ORAL at 22:49

## 2024-07-27 RX ADMIN — FAMOTIDINE 40 MG: 20 TABLET ORAL at 09:44

## 2024-07-27 RX ADMIN — DOXYCYCLINE HYCLATE 100 MG: 100 CAPSULE ORAL at 09:44

## 2024-07-27 RX ADMIN — CYANOCOBALAMIN TAB 1000 MCG 1000 MCG: 1000 TAB at 09:45

## 2024-07-27 RX ADMIN — OXYCODONE HYDROCHLORIDE 10 MG: 5 TABLET ORAL at 01:31

## 2024-07-27 RX ADMIN — OXYCODONE HYDROCHLORIDE 10 MG: 5 TABLET ORAL at 14:05

## 2024-07-27 RX ADMIN — ASPIRIN 81 MG: 81 TABLET, COATED ORAL at 09:43

## 2024-07-27 RX ADMIN — OXYCODONE HYDROCHLORIDE 10 MG: 5 TABLET ORAL at 06:18

## 2024-07-27 RX ADMIN — FUROSEMIDE 20 MG: 20 TABLET ORAL at 16:13

## 2024-07-27 RX ADMIN — SODIUM CHLORIDE, PRESERVATIVE FREE 10 ML: 5 INJECTION INTRAVENOUS at 09:45

## 2024-07-27 RX ADMIN — OXYCODONE HYDROCHLORIDE 10 MG: 5 TABLET ORAL at 18:09

## 2024-07-27 RX ADMIN — PANTOPRAZOLE SODIUM 40 MG: 40 TABLET, DELAYED RELEASE ORAL at 06:19

## 2024-07-27 ASSESSMENT — PAIN - FUNCTIONAL ASSESSMENT
PAIN_FUNCTIONAL_ASSESSMENT: ACTIVITIES ARE NOT PREVENTED

## 2024-07-27 ASSESSMENT — PAIN DESCRIPTION - ORIENTATION
ORIENTATION: RIGHT

## 2024-07-27 ASSESSMENT — PAIN DESCRIPTION - LOCATION
LOCATION: LEG;FOOT
LOCATION: LEG
LOCATION: LEG;FOOT
LOCATION: LEG
LOCATION: LEG
LOCATION: LEG;FOOT

## 2024-07-27 ASSESSMENT — PAIN SCALES - GENERAL
PAINLEVEL_OUTOF10: 7
PAINLEVEL_OUTOF10: 7
PAINLEVEL_OUTOF10: 8

## 2024-07-27 ASSESSMENT — PAIN DESCRIPTION - DESCRIPTORS
DESCRIPTORS: ACHING;DISCOMFORT;SORE;TENDER
DESCRIPTORS: BURNING;SHARP
DESCRIPTORS: BURNING
DESCRIPTORS: BURNING;SHARP
DESCRIPTORS: ACHING;SORE;TENDER;DISCOMFORT
DESCRIPTORS: ACHING;SORE

## 2024-07-27 ASSESSMENT — PAIN DESCRIPTION - PAIN TYPE: TYPE: SURGICAL PAIN

## 2024-07-28 LAB
GLUCOSE BLD-MCNC: 109 MG/DL (ref 74–99)
GLUCOSE BLD-MCNC: 92 MG/DL (ref 74–99)
GLUCOSE BLD-MCNC: 92 MG/DL (ref 74–99)
GLUCOSE BLD-MCNC: 94 MG/DL (ref 74–99)

## 2024-07-28 PROCEDURE — 6360000002 HC RX W HCPCS

## 2024-07-28 PROCEDURE — 6360000002 HC RX W HCPCS: Performed by: INTERNAL MEDICINE

## 2024-07-28 PROCEDURE — G0378 HOSPITAL OBSERVATION PER HR: HCPCS

## 2024-07-28 PROCEDURE — 96376 TX/PRO/DX INJ SAME DRUG ADON: CPT

## 2024-07-28 PROCEDURE — 6370000000 HC RX 637 (ALT 250 FOR IP): Performed by: INTERNAL MEDICINE

## 2024-07-28 PROCEDURE — 2580000003 HC RX 258: Performed by: INTERNAL MEDICINE

## 2024-07-28 PROCEDURE — 82962 GLUCOSE BLOOD TEST: CPT

## 2024-07-28 PROCEDURE — 99232 SBSQ HOSP IP/OBS MODERATE 35: CPT | Performed by: INTERNAL MEDICINE

## 2024-07-28 PROCEDURE — 6370000000 HC RX 637 (ALT 250 FOR IP): Performed by: STUDENT IN AN ORGANIZED HEALTH CARE EDUCATION/TRAINING PROGRAM

## 2024-07-28 PROCEDURE — 6370000000 HC RX 637 (ALT 250 FOR IP)

## 2024-07-28 PROCEDURE — 96372 THER/PROPH/DIAG INJ SC/IM: CPT

## 2024-07-28 PROCEDURE — 2580000003 HC RX 258

## 2024-07-28 RX ADMIN — OXYCODONE HYDROCHLORIDE 10 MG: 5 TABLET ORAL at 14:30

## 2024-07-28 RX ADMIN — ALPRAZOLAM 0.75 MG: 0.25 TABLET ORAL at 09:08

## 2024-07-28 RX ADMIN — CYANOCOBALAMIN TAB 1000 MCG 1000 MCG: 1000 TAB at 09:09

## 2024-07-28 RX ADMIN — FUROSEMIDE 20 MG: 20 TABLET ORAL at 09:09

## 2024-07-28 RX ADMIN — ENOXAPARIN SODIUM 30 MG: 100 INJECTION SUBCUTANEOUS at 20:19

## 2024-07-28 RX ADMIN — ALPRAZOLAM 0.75 MG: 0.25 TABLET ORAL at 20:13

## 2024-07-28 RX ADMIN — DOXYCYCLINE HYCLATE 100 MG: 100 CAPSULE ORAL at 20:19

## 2024-07-28 RX ADMIN — OXYCODONE HYDROCHLORIDE 10 MG: 5 TABLET ORAL at 22:26

## 2024-07-28 RX ADMIN — LEVOTHYROXINE SODIUM 125 MCG: 25 TABLET ORAL at 06:13

## 2024-07-28 RX ADMIN — ALPRAZOLAM 0.75 MG: 0.25 TABLET ORAL at 17:07

## 2024-07-28 RX ADMIN — OXYCODONE HYDROCHLORIDE 10 MG: 5 TABLET ORAL at 10:25

## 2024-07-28 RX ADMIN — MONTELUKAST SODIUM 10 MG: 10 TABLET, FILM COATED ORAL at 09:08

## 2024-07-28 RX ADMIN — ENOXAPARIN SODIUM 30 MG: 100 INJECTION SUBCUTANEOUS at 09:08

## 2024-07-28 RX ADMIN — ALPRAZOLAM 0.75 MG: 0.25 TABLET ORAL at 13:39

## 2024-07-28 RX ADMIN — OXYCODONE HYDROCHLORIDE 10 MG: 5 TABLET ORAL at 02:00

## 2024-07-28 RX ADMIN — OXYCODONE HYDROCHLORIDE 10 MG: 5 TABLET ORAL at 18:37

## 2024-07-28 RX ADMIN — ONDANSETRON 4 MG: 2 INJECTION INTRAMUSCULAR; INTRAVENOUS at 23:50

## 2024-07-28 RX ADMIN — DOCUSATE SODIUM 100 MG: 100 CAPSULE, LIQUID FILLED ORAL at 09:09

## 2024-07-28 RX ADMIN — ASPIRIN 81 MG: 81 TABLET, COATED ORAL at 09:08

## 2024-07-28 RX ADMIN — DOCUSATE SODIUM 100 MG: 100 CAPSULE, LIQUID FILLED ORAL at 20:13

## 2024-07-28 RX ADMIN — TIZANIDINE 4 MG: 4 TABLET ORAL at 20:13

## 2024-07-28 RX ADMIN — TIZANIDINE 4 MG: 4 TABLET ORAL at 16:24

## 2024-07-28 RX ADMIN — OXYCODONE HYDROCHLORIDE 10 MG: 5 TABLET ORAL at 06:13

## 2024-07-28 RX ADMIN — CLOZAPINE 100 MG: 100 TABLET ORAL at 23:15

## 2024-07-28 RX ADMIN — TIZANIDINE 4 MG: 4 TABLET ORAL at 09:09

## 2024-07-28 RX ADMIN — SODIUM CHLORIDE, PRESERVATIVE FREE 10 ML: 5 INJECTION INTRAVENOUS at 20:14

## 2024-07-28 RX ADMIN — DOXYCYCLINE HYCLATE 100 MG: 100 CAPSULE ORAL at 09:09

## 2024-07-28 RX ADMIN — FAMOTIDINE 40 MG: 20 TABLET ORAL at 09:09

## 2024-07-28 RX ADMIN — PANTOPRAZOLE SODIUM 40 MG: 40 TABLET, DELAYED RELEASE ORAL at 06:13

## 2024-07-28 RX ADMIN — Medication 1 TABLET: at 09:09

## 2024-07-28 RX ADMIN — FUROSEMIDE 20 MG: 20 TABLET ORAL at 16:24

## 2024-07-28 RX ADMIN — SODIUM CHLORIDE, PRESERVATIVE FREE 10 ML: 5 INJECTION INTRAVENOUS at 09:09

## 2024-07-28 ASSESSMENT — PAIN DESCRIPTION - LOCATION
LOCATION: LEG;FOOT
LOCATION: LEG;FOOT
LOCATION: FOOT;TOE (COMMENT WHICH ONE)
LOCATION: LEG
LOCATION: FOOT

## 2024-07-28 ASSESSMENT — PAIN SCALES - GENERAL
PAINLEVEL_OUTOF10: 8
PAINLEVEL_OUTOF10: 9
PAINLEVEL_OUTOF10: 8
PAINLEVEL_OUTOF10: 7

## 2024-07-28 ASSESSMENT — PAIN DESCRIPTION - ORIENTATION
ORIENTATION: RIGHT

## 2024-07-28 ASSESSMENT — PAIN DESCRIPTION - DESCRIPTORS
DESCRIPTORS: SHARP
DESCRIPTORS: ACHING
DESCRIPTORS: ACHING;DISCOMFORT;SORE
DESCRIPTORS: BURNING
DESCRIPTORS: ACHING;SORE

## 2024-07-28 ASSESSMENT — PAIN - FUNCTIONAL ASSESSMENT
PAIN_FUNCTIONAL_ASSESSMENT: ACTIVITIES ARE NOT PREVENTED

## 2024-07-28 NOTE — PLAN OF CARE
Problem: Chronic Conditions and Co-morbidities  Goal: Patient's chronic conditions and co-morbidity symptoms are monitored and maintained or improved  7/28/2024 1128 by Susana Pina RN  Outcome: Progressing  7/27/2024 2316 by Xochilt Mujica RN  Outcome: Progressing     Problem: Discharge Planning  Goal: Discharge to home or other facility with appropriate resources  7/28/2024 1128 by Susana Pina RN  Outcome: Progressing  7/27/2024 2316 by Xochilt Mujica RN  Outcome: Progressing     Problem: Pain  Goal: Verbalizes/displays adequate comfort level or baseline comfort level  7/28/2024 1128 by Susana Pina RN  Outcome: Progressing  7/27/2024 2316 by Xochilt Mujica RN  Outcome: Progressing     Problem: Safety - Adult  Goal: Free from fall injury  7/28/2024 1128 by Susana Pina RN  Outcome: Progressing  7/27/2024 2316 by Xochilt Mujica RN  Outcome: Progressing     Problem: ABCDS Injury Assessment  Goal: Absence of physical injury  Outcome: Progressing     Problem: Skin/Tissue Integrity  Goal: Absence of new skin breakdown  Description: 1.  Monitor for areas of redness and/or skin breakdown  2.  Assess vascular access sites hourly  3.  Every 4-6 hours minimum:  Change oxygen saturation probe site  4.  Every 4-6 hours:  If on nasal continuous positive airway pressure, respiratory therapy assess nares and determine need for appliance change or resting period.  Outcome: Progressing

## 2024-07-29 VITALS
SYSTOLIC BLOOD PRESSURE: 126 MMHG | HEIGHT: 66 IN | DIASTOLIC BLOOD PRESSURE: 58 MMHG | TEMPERATURE: 98.1 F | OXYGEN SATURATION: 99 % | WEIGHT: 260.14 LBS | HEART RATE: 74 BPM | BODY MASS INDEX: 41.81 KG/M2 | RESPIRATION RATE: 18 BRPM

## 2024-07-29 LAB
ANION GAP SERPL CALCULATED.3IONS-SCNC: 8 MMOL/L (ref 7–16)
BUN SERPL-MCNC: 16 MG/DL (ref 6–20)
CALCIUM SERPL-MCNC: 9.8 MG/DL (ref 8.6–10.2)
CHLORIDE SERPL-SCNC: 102 MMOL/L (ref 98–107)
CO2 SERPL-SCNC: 30 MMOL/L (ref 22–29)
CREAT SERPL-MCNC: 0.9 MG/DL (ref 0.5–1)
ERYTHROCYTE [DISTWIDTH] IN BLOOD BY AUTOMATED COUNT: 12.4 % (ref 11.5–15)
GFR, ESTIMATED: 76 ML/MIN/1.73M2
GLUCOSE BLD-MCNC: 95 MG/DL (ref 74–99)
GLUCOSE BLD-MCNC: 97 MG/DL (ref 74–99)
GLUCOSE SERPL-MCNC: 105 MG/DL (ref 74–99)
HCT VFR BLD AUTO: 35.3 % (ref 34–48)
HGB BLD-MCNC: 11.1 G/DL (ref 11.5–15.5)
MCH RBC QN AUTO: 29.1 PG (ref 26–35)
MCHC RBC AUTO-ENTMCNC: 31.4 G/DL (ref 32–34.5)
MCV RBC AUTO: 92.4 FL (ref 80–99.9)
PLATELET # BLD AUTO: 457 K/UL (ref 130–450)
PMV BLD AUTO: 9.5 FL (ref 7–12)
POTASSIUM SERPL-SCNC: 4.3 MMOL/L (ref 3.5–5)
RBC # BLD AUTO: 3.82 M/UL (ref 3.5–5.5)
SODIUM SERPL-SCNC: 140 MMOL/L (ref 132–146)
WBC OTHER # BLD: 6.5 K/UL (ref 4.5–11.5)

## 2024-07-29 PROCEDURE — 85027 COMPLETE CBC AUTOMATED: CPT

## 2024-07-29 PROCEDURE — G0378 HOSPITAL OBSERVATION PER HR: HCPCS

## 2024-07-29 PROCEDURE — 6370000000 HC RX 637 (ALT 250 FOR IP): Performed by: STUDENT IN AN ORGANIZED HEALTH CARE EDUCATION/TRAINING PROGRAM

## 2024-07-29 PROCEDURE — 6370000000 HC RX 637 (ALT 250 FOR IP): Performed by: INTERNAL MEDICINE

## 2024-07-29 PROCEDURE — 2580000003 HC RX 258: Performed by: INTERNAL MEDICINE

## 2024-07-29 PROCEDURE — 80048 BASIC METABOLIC PNL TOTAL CA: CPT

## 2024-07-29 PROCEDURE — 2580000003 HC RX 258

## 2024-07-29 PROCEDURE — 97530 THERAPEUTIC ACTIVITIES: CPT

## 2024-07-29 PROCEDURE — 82962 GLUCOSE BLOOD TEST: CPT

## 2024-07-29 PROCEDURE — 99239 HOSP IP/OBS DSCHRG MGMT >30: CPT | Performed by: INTERNAL MEDICINE

## 2024-07-29 PROCEDURE — 6370000000 HC RX 637 (ALT 250 FOR IP)

## 2024-07-29 PROCEDURE — 96372 THER/PROPH/DIAG INJ SC/IM: CPT

## 2024-07-29 PROCEDURE — 6360000002 HC RX W HCPCS: Performed by: INTERNAL MEDICINE

## 2024-07-29 RX ORDER — ALPRAZOLAM 0.5 MG/1
0.75 TABLET ORAL 4 TIMES DAILY
Qty: 18 TABLET | Refills: 0 | Status: SHIPPED | OUTPATIENT
Start: 2024-07-29 | End: 2024-08-01

## 2024-07-29 RX ORDER — OXYCODONE HYDROCHLORIDE 10 MG/1
10 TABLET ORAL EVERY 4 HOURS PRN
Qty: 18 TABLET | Refills: 0 | Status: SHIPPED | OUTPATIENT
Start: 2024-07-29 | End: 2024-08-01

## 2024-07-29 RX ADMIN — ENOXAPARIN SODIUM 30 MG: 100 INJECTION SUBCUTANEOUS at 09:10

## 2024-07-29 RX ADMIN — ALPRAZOLAM 0.75 MG: 0.25 TABLET ORAL at 13:47

## 2024-07-29 RX ADMIN — LEVOTHYROXINE SODIUM 125 MCG: 25 TABLET ORAL at 05:59

## 2024-07-29 RX ADMIN — Medication 1 TABLET: at 09:11

## 2024-07-29 RX ADMIN — MONTELUKAST SODIUM 10 MG: 10 TABLET, FILM COATED ORAL at 09:11

## 2024-07-29 RX ADMIN — FAMOTIDINE 40 MG: 20 TABLET ORAL at 09:11

## 2024-07-29 RX ADMIN — ALPRAZOLAM 0.75 MG: 0.25 TABLET ORAL at 09:11

## 2024-07-29 RX ADMIN — OXYCODONE HYDROCHLORIDE 10 MG: 5 TABLET ORAL at 09:11

## 2024-07-29 RX ADMIN — CYANOCOBALAMIN TAB 1000 MCG 1000 MCG: 1000 TAB at 09:11

## 2024-07-29 RX ADMIN — DOXYCYCLINE HYCLATE 100 MG: 100 CAPSULE ORAL at 09:11

## 2024-07-29 RX ADMIN — FUROSEMIDE 20 MG: 20 TABLET ORAL at 09:11

## 2024-07-29 RX ADMIN — POLYETHYLENE GLYCOL 3350 17 G: 17 POWDER, FOR SOLUTION ORAL at 09:12

## 2024-07-29 RX ADMIN — TIZANIDINE 4 MG: 4 TABLET ORAL at 09:11

## 2024-07-29 RX ADMIN — DOCUSATE SODIUM 100 MG: 100 CAPSULE, LIQUID FILLED ORAL at 09:11

## 2024-07-29 RX ADMIN — OXYCODONE HYDROCHLORIDE 10 MG: 5 TABLET ORAL at 13:48

## 2024-07-29 RX ADMIN — SODIUM CHLORIDE, PRESERVATIVE FREE 10 ML: 5 INJECTION INTRAVENOUS at 09:12

## 2024-07-29 RX ADMIN — OXYCODONE HYDROCHLORIDE 10 MG: 5 TABLET ORAL at 03:29

## 2024-07-29 RX ADMIN — ASPIRIN 81 MG: 81 TABLET, COATED ORAL at 09:11

## 2024-07-29 RX ADMIN — PANTOPRAZOLE SODIUM 40 MG: 40 TABLET, DELAYED RELEASE ORAL at 05:59

## 2024-07-29 ASSESSMENT — PAIN DESCRIPTION - ORIENTATION
ORIENTATION: RIGHT

## 2024-07-29 ASSESSMENT — PAIN DESCRIPTION - LOCATION
LOCATION: FOOT

## 2024-07-29 ASSESSMENT — PAIN SCALES - GENERAL
PAINLEVEL_OUTOF10: 8

## 2024-07-29 ASSESSMENT — PAIN SCALES - WONG BAKER: WONGBAKER_NUMERICALRESPONSE: NO HURT

## 2024-07-29 ASSESSMENT — PAIN DESCRIPTION - DESCRIPTORS: DESCRIPTORS: THROBBING;SHARP

## 2024-07-29 NOTE — PATIENT CARE CONFERENCE
P Quality Flow/Interdisciplinary Rounds Progress Note        Quality Flow Rounds held on July 29, 2024    Disciplines Attending:  Bedside Nurse, , , and Nursing Unit Leadership    Ronny Uribe was admitted on 7/22/2024  4:55 AM    Anticipated Discharge Date:       Disposition:    Rei Score:  Rei Scale Score: 20    Readmission Risk              Risk of Unplanned Readmission:  0           Discussed patient goal for the day, patient clinical progression, and barriers to discharge.  The following Goal(s) of the Day/Commitment(s) have been identified:  dc planning      Usha Galdamez RN  July 29, 2024

## 2024-07-29 NOTE — DISCHARGE SUMMARY
[place], [place] - [address] - P [phone] - F [phone]  [address] [address]      Phone: [phone]   doxycycline hyclate 100 MG tablet  Xarelto 10 MG Tabs tablet           Note that more than 30 minutes was spent in preparing discharge papers, discussing discharge with patient, medication review, etc.    Signed:  Electronically signed by Tyson Ramirez MD on 7/29/2024 at 1:16 PM

## 2024-07-29 NOTE — PLAN OF CARE
Problem: Chronic Conditions and Co-morbidities  Goal: Patient's chronic conditions and co-morbidity symptoms are monitored and maintained or improved  7/29/2024 0016 by Nicolette Leger RN  Outcome: Progressing  7/28/2024 1128 by Susana Pina RN  Outcome: Progressing     Problem: Discharge Planning  Goal: Discharge to home or other facility with appropriate resources  7/29/2024 0016 by Nicolette Leger RN  Outcome: Progressing  7/28/2024 1128 by Susana Pina RN  Outcome: Progressing     Problem: Pain  Goal: Verbalizes/displays adequate comfort level or baseline comfort level  7/29/2024 0016 by Nicolette Leger RN  Outcome: Progressing  7/28/2024 1128 by Susana Pina RN  Outcome: Progressing     Problem: Safety - Adult  Goal: Free from fall injury  7/29/2024 0016 by Nicolette Leger RN  Outcome: Progressing  7/28/2024 1128 by Susana Pina RN  Outcome: Progressing     Problem: ABCDS Injury Assessment  Goal: Absence of physical injury  7/29/2024 0016 by Nicolette Leger RN  Outcome: Progressing  7/28/2024 1128 by Susana Pina RN  Outcome: Progressing     Problem: Skin/Tissue Integrity  Goal: Absence of new skin breakdown  Description: 1.  Monitor for areas of redness and/or skin breakdown  2.  Assess vascular access sites hourly  3.  Every 4-6 hours minimum:  Change oxygen saturation probe site  4.  Every 4-6 hours:  If on nasal continuous positive airway pressure, respiratory therapy assess nares and determine need for appliance change or resting period.  7/29/2024 0016 by Nicolette Leger RN  Outcome: Progressing  7/28/2024 1128 by Susana Pina RN  Outcome: Progressing

## 2024-07-29 NOTE — PROGRESS NOTES
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     ROS: denies fever, chills, cp, sob, n/v, HA unless stated above.      polyethylene glycol  17 g Oral Daily    doxycycline  100 mg Oral 2 times per day    ceFAZolin  2,000 mg IntraVENous 30 Min Pre-Op    sodium chloride flush  5-40 mL IntraVENous 2 times per day    ALPRAZolam  0.75 mg Oral 4x daily    aspirin  81 mg Oral Daily    cloZAPine  100 mg Oral Nightly    docusate sodium  100 mg Oral BID    famotidine  40 mg Oral Daily    furosemide  20 mg Oral BID    linaclotide  145 mcg Oral QAM AC    montelukast  10 mg Oral Daily    therapeutic multivitamin-minerals  1 tablet Oral Daily    pantoprazole  40 mg Oral QAM AC    oxyCODONE HCl  10 mg Oral BID    tiZANidine  4 mg Oral TID    vitamin B-12  1,000 mcg Oral Daily    sodium chloride flush  5-40 mL IntraVENous 2 times per day    enoxaparin  30 mg SubCUTAneous BID    insulin lispro  0-4 Units SubCUTAneous TID WC    insulin lispro  0-4 Units SubCUTAneous Nightly    levothyroxine  125 mcg Oral Daily     sodium chloride flush, 5-40 mL, PRN  sodium chloride, , PRN  promethazine, 12.5 mg, Q6H PRN   Or  ondansetron, 4 mg, Q6H PRN  sodium chloride, , PRN  potassium chloride, 40 mEq, PRN   Or  potassium 
       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     ROS: denies fever, chills, cp, sob, n/v, HA unless stated above.      polyethylene glycol  17 g Oral Daily    doxycycline  100 mg Oral 2 times per day    ceFAZolin  2,000 mg IntraVENous 30 Min Pre-Op    sodium chloride flush  5-40 mL IntraVENous 2 times per day    ALPRAZolam  0.75 mg Oral 4x daily    aspirin  81 mg Oral Daily    cloZAPine  100 mg Oral Nightly    docusate sodium  100 mg Oral BID    famotidine  40 mg Oral Daily    furosemide  20 mg Oral BID    linaclotide  145 mcg Oral QAM AC    montelukast  10 mg Oral Daily    therapeutic multivitamin-minerals  1 tablet Oral Daily    pantoprazole  40 mg Oral QAM AC    oxyCODONE HCl  10 mg Oral BID    tiZANidine  4 mg Oral TID    vitamin B-12  1,000 mcg Oral Daily    sodium chloride flush  5-40 mL IntraVENous 2 times per day    enoxaparin  30 mg SubCUTAneous BID    insulin lispro  0-4 Units SubCUTAneous TID WC    insulin lispro  0-4 Units SubCUTAneous Nightly    levothyroxine  125 mcg Oral Daily     sodium chloride flush, 5-40 mL, PRN  sodium chloride, , PRN  promethazine, 12.5 mg, Q6H PRN   Or  ondansetron, 4 mg, Q6H PRN  sodium chloride, , PRN  potassium chloride, 40 mEq, PRN   Or  potassium 
       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     ROS: denies fever, chills, cp, sob, n/v, HA unless stated above.      doxycycline  100 mg Oral 2 times per day    scopolamine  1 patch TransDERmal Once    ceFAZolin  2,000 mg IntraVENous 30 Min Pre-Op    sodium chloride flush  5-40 mL IntraVENous 2 times per day    ALPRAZolam  0.75 mg Oral 4x daily    aspirin  81 mg Oral Daily    cloZAPine  100 mg Oral Nightly    docusate sodium  100 mg Oral BID    famotidine  40 mg Oral Daily    furosemide  20 mg Oral BID    linaclotide  145 mcg Oral QAM AC    montelukast  10 mg Oral Daily    therapeutic multivitamin-minerals  1 tablet Oral Daily    pantoprazole  40 mg Oral QAM AC    oxyCODONE HCl  10 mg Oral BID    tiZANidine  4 mg Oral TID    vitamin B-12  1,000 mcg Oral Daily    sodium chloride flush  5-40 mL IntraVENous 2 times per day    enoxaparin  30 mg SubCUTAneous BID    insulin lispro  0-4 Units SubCUTAneous TID WC    insulin lispro  0-4 Units SubCUTAneous Nightly    levothyroxine  125 mcg Oral Daily     sodium chloride flush, 5-40 mL, PRN  sodium chloride, , PRN  promethazine, 12.5 mg, Q6H PRN   Or  ondansetron, 4 mg, Q6H PRN  sodium chloride, , PRN  potassium chloride, 40 mEq, PRN   Or  potassium 
     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 Denies CP ,sob or  abd pain. Leg /foot pain well controlled with current meds.       polyethylene glycol  17 g Oral Daily    doxycycline  100 mg Oral 2 times per day    ceFAZolin  2,000 mg IntraVENous 30 Min Pre-Op    sodium chloride flush  5-40 mL IntraVENous 2 times per day    ALPRAZolam  0.75 mg Oral 4x daily    aspirin  81 mg Oral Daily    cloZAPine  100 mg Oral Nightly    docusate sodium  100 mg Oral BID    famotidine  40 mg Oral Daily    furosemide  20 mg Oral BID    linaclotide  145 mcg Oral QAM AC    montelukast  10 mg Oral Daily    therapeutic multivitamin-minerals  1 tablet Oral Daily    pantoprazole  40 mg Oral QAM AC    oxyCODONE HCl  10 mg Oral BID    tiZANidine  4 mg Oral TID    vitamin B-12  1,000 mcg Oral Daily    sodium chloride flush  5-40 mL IntraVENous 2 times per day    enoxaparin  30 mg SubCUTAneous BID    insulin lispro  0-4 Units SubCUTAneous TID WC    insulin lispro  0-4 Units SubCUTAneous Nightly    levothyroxine  125 mcg Oral Daily     sodium chloride flush, 5-40 mL, PRN  sodium chloride, , PRN  promethazine, 12.5 mg, Q6H PRN   Or  ondansetron, 4 mg, Q6H PRN  sodium chloride, , PRN  potassium chloride, 40 mEq, PRN   Or  potassium alternative oral replacement, 40 mEq, PRN   Or  potassium chloride, 10 mEq, PRN  magnesium sulfate, 2,000 mg, PRN  acetaminophen, 650 mg, Q6H PRN   Or  acetaminophen, 650 mg, Q6H PRN  dextrose bolus, 125 mL, PRN   Or  dextrose 
     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  Right lower extremity pain well-controlled with current medications.  Discussed with nursing.  Uneventful night.    ROS: denies fever, chills, cp, sob, n/v, HA unless stated above.     polyethylene glycol  17 g Oral Daily    doxycycline  100 mg Oral 2 times per day    ceFAZolin  2,000 mg IntraVENous 30 Min Pre-Op    sodium chloride flush  5-40 mL IntraVENous 2 times per day    ALPRAZolam  0.75 mg Oral 4x daily    aspirin  81 mg Oral Daily    cloZAPine  100 mg Oral Nightly    docusate sodium  100 mg Oral BID    famotidine  40 mg Oral Daily    furosemide  20 mg Oral BID    linaclotide  145 mcg Oral QAM AC    montelukast  10 mg Oral Daily    therapeutic multivitamin-minerals  1 tablet Oral Daily    pantoprazole  40 mg Oral QAM AC    oxyCODONE HCl  10 mg Oral BID    tiZANidine  4 mg Oral TID    vitamin B-12  1,000 mcg Oral Daily    sodium chloride flush  5-40 mL IntraVENous 2 times per day    enoxaparin  30 mg SubCUTAneous BID    insulin lispro  0-4 Units SubCUTAneous TID     insulin lispro  0-4 Units SubCUTAneous Nightly    levothyroxine  125 mcg Oral Daily     sodium chloride flush, 5-40 mL, PRN  sodium chloride, , PRN  promethazine, 12.5 mg, Q6H PRN   Or  ondansetron, 4 mg, Q6H PRN  sodium chloride, , PRN  potassium chloride, 40 mEq, PRN   Or  potassium alternative oral replacement, 40 mEq, PRN   Or  potassium chloride, 10 mEq, PRN  magnesium sulfate, 2,000 mg, PRN  acetaminophen, 650 
0105 Charlette from social work contacted per family request for possible rehab placement. Also Spoke with Dr Sandoval  for discharge instructions and rehab orders.   Will follow up after social work sees yoel   
Department of Podiatry  Progress Note    SUBJECTIVE:  Patient is seen at bedside s/p right EGR, Calc osteotomies, midfoot fusion, lapidus, PB tendon repair, modified Brostrom DOS 7/22/2024. No acute events overnight. Patient is awaiting rehab placement for discharge.  Patient states that she is having pain in her foot, but the pain medications help with the pain. Patient denies any N/V/D/F/C/SOB/CP. No other pedal complaints at this time.     OBJECTIVE:    Scheduled Meds:   polyethylene glycol  17 g Oral Daily    doxycycline  100 mg Oral 2 times per day    ceFAZolin  2,000 mg IntraVENous 30 Min Pre-Op    sodium chloride flush  5-40 mL IntraVENous 2 times per day    ALPRAZolam  0.75 mg Oral 4x daily    aspirin  81 mg Oral Daily    cloZAPine  100 mg Oral Nightly    docusate sodium  100 mg Oral BID    famotidine  40 mg Oral Daily    furosemide  20 mg Oral BID    linaclotide  145 mcg Oral QAM AC    montelukast  10 mg Oral Daily    therapeutic multivitamin-minerals  1 tablet Oral Daily    pantoprazole  40 mg Oral QAM AC    oxyCODONE HCl  10 mg Oral BID    tiZANidine  4 mg Oral TID    vitamin B-12  1,000 mcg Oral Daily    sodium chloride flush  5-40 mL IntraVENous 2 times per day    enoxaparin  30 mg SubCUTAneous BID    insulin lispro  0-4 Units SubCUTAneous TID WC    insulin lispro  0-4 Units SubCUTAneous Nightly    levothyroxine  125 mcg Oral Daily     Continuous Infusions:   sodium chloride      sodium chloride      dextrose       PRN Meds:.sodium chloride flush, sodium chloride, promethazine **OR** ondansetron, sodium chloride, potassium chloride **OR** potassium alternative oral replacement **OR** potassium chloride, magnesium sulfate, acetaminophen **OR** acetaminophen, dextrose bolus **OR** dextrose bolus, glucagon (rDNA), dextrose, Glucose, oxyCODONE, oxyCODONE    Allergies   Allergen Reactions    Latex Rash     second degree burn    Fish Allergy Anaphylaxis    Adhesive Tape Other (See Comments)     Redness, 
Department of Podiatry  Progress Note    SUBJECTIVE:  Patient is seen at bedside s/p right EGR, Calc osteotomies, midfoot fusion, lapidus, PB tendon repair, modified Brostrom DOS 7/22/2024. No acute events overnight. Patient is awaiting rehab placement for discharge.  Patient states that she is having pain mostly on the inside of her foot.  She says the rest of her foot is not really hurting that much at this time.  She says the Roxicodone she has been receiving for pain has been helping with the pain.  Patient denies any N/V/D/F/C/SOB/CP. No other pedal complaints at this time.     OBJECTIVE:    Scheduled Meds:   polyethylene glycol  17 g Oral Daily    doxycycline  100 mg Oral 2 times per day    ceFAZolin  2,000 mg IntraVENous 30 Min Pre-Op    sodium chloride flush  5-40 mL IntraVENous 2 times per day    ALPRAZolam  0.75 mg Oral 4x daily    aspirin  81 mg Oral Daily    cloZAPine  100 mg Oral Nightly    docusate sodium  100 mg Oral BID    famotidine  40 mg Oral Daily    furosemide  20 mg Oral BID    linaclotide  145 mcg Oral QAM AC    montelukast  10 mg Oral Daily    therapeutic multivitamin-minerals  1 tablet Oral Daily    pantoprazole  40 mg Oral QAM AC    oxyCODONE HCl  10 mg Oral BID    tiZANidine  4 mg Oral TID    vitamin B-12  1,000 mcg Oral Daily    sodium chloride flush  5-40 mL IntraVENous 2 times per day    enoxaparin  30 mg SubCUTAneous BID    insulin lispro  0-4 Units SubCUTAneous TID WC    insulin lispro  0-4 Units SubCUTAneous Nightly    levothyroxine  125 mcg Oral Daily     Continuous Infusions:   sodium chloride      sodium chloride      dextrose       PRN Meds:.sodium chloride flush, sodium chloride, promethazine **OR** ondansetron, sodium chloride, potassium chloride **OR** potassium alternative oral replacement **OR** potassium chloride, magnesium sulfate, acetaminophen **OR** acetaminophen, dextrose bolus **OR** dextrose bolus, glucagon (rDNA), dextrose, Glucose, oxyCODONE, oxyCODONE    Allergies 
Department of Podiatry  Progress Note    SUBJECTIVE:  Patient is seen at bedside s/p right EGR, Calc osteotomies, midfoot fusion,lapidus, PB tendon repair, modified Brostrom DOS 7/22/2024. No acute events overnight. Patient is awaiting rehab placement. Denies calf pain. Patient denies any N/V/D/F/C/SOB/CP. No other pedal complaints at this time.     OBJECTIVE:    Scheduled Meds:   doxycycline  100 mg Oral 2 times per day    scopolamine  1 patch TransDERmal Once    ceFAZolin  2,000 mg IntraVENous 30 Min Pre-Op    sodium chloride flush  5-40 mL IntraVENous 2 times per day    ALPRAZolam  0.75 mg Oral 4x daily    aspirin  81 mg Oral Daily    cloZAPine  100 mg Oral Nightly    docusate sodium  100 mg Oral BID    famotidine  40 mg Oral Daily    furosemide  20 mg Oral BID    linaclotide  145 mcg Oral QAM AC    montelukast  10 mg Oral Daily    therapeutic multivitamin-minerals  1 tablet Oral Daily    pantoprazole  40 mg Oral QAM AC    oxyCODONE HCl  10 mg Oral BID    tiZANidine  4 mg Oral TID    vitamin B-12  1,000 mcg Oral Daily    sodium chloride flush  5-40 mL IntraVENous 2 times per day    enoxaparin  30 mg SubCUTAneous BID    insulin lispro  0-4 Units SubCUTAneous TID WC    insulin lispro  0-4 Units SubCUTAneous Nightly    levothyroxine  125 mcg Oral Daily     Continuous Infusions:   sodium chloride      sodium chloride      dextrose       PRN Meds:.sodium chloride flush, sodium chloride, promethazine **OR** ondansetron, sodium chloride, potassium chloride **OR** potassium alternative oral replacement **OR** potassium chloride, magnesium sulfate, acetaminophen **OR** acetaminophen, dextrose bolus **OR** dextrose bolus, glucagon (rDNA), dextrose, Glucose, oxyCODONE, oxyCODONE    Allergies   Allergen Reactions    Latex Rash     second degree burn    Fish Allergy Anaphylaxis    Adhesive Tape Other (See Comments)     Redness, blisters    Fentanyl Other (See Comments)     Fentanyl patch caused panic attack    
Department of Podiatry  Progress Note    SUBJECTIVE:  Patient is seen at bedside s/p right EGR, Calc osteotomies, midfoot fusion,lapidus, PB tendon repair, modified Brostrom DOS 7/22/2024. No acute events overnight. Patient is awaiting rehab placement. Patient denies any N/V/D/F/C/SOB/CP. No other pedal complaints at this time.     OBJECTIVE:    Scheduled Meds:   polyethylene glycol  17 g Oral Daily    doxycycline  100 mg Oral 2 times per day    ceFAZolin  2,000 mg IntraVENous 30 Min Pre-Op    sodium chloride flush  5-40 mL IntraVENous 2 times per day    ALPRAZolam  0.75 mg Oral 4x daily    aspirin  81 mg Oral Daily    cloZAPine  100 mg Oral Nightly    docusate sodium  100 mg Oral BID    famotidine  40 mg Oral Daily    furosemide  20 mg Oral BID    linaclotide  145 mcg Oral QAM AC    montelukast  10 mg Oral Daily    therapeutic multivitamin-minerals  1 tablet Oral Daily    pantoprazole  40 mg Oral QAM AC    oxyCODONE HCl  10 mg Oral BID    tiZANidine  4 mg Oral TID    vitamin B-12  1,000 mcg Oral Daily    sodium chloride flush  5-40 mL IntraVENous 2 times per day    enoxaparin  30 mg SubCUTAneous BID    insulin lispro  0-4 Units SubCUTAneous TID WC    insulin lispro  0-4 Units SubCUTAneous Nightly    levothyroxine  125 mcg Oral Daily     Continuous Infusions:   sodium chloride      sodium chloride      dextrose       PRN Meds:.sodium chloride flush, sodium chloride, promethazine **OR** ondansetron, sodium chloride, potassium chloride **OR** potassium alternative oral replacement **OR** potassium chloride, magnesium sulfate, acetaminophen **OR** acetaminophen, dextrose bolus **OR** dextrose bolus, glucagon (rDNA), dextrose, Glucose, oxyCODONE, oxyCODONE    Allergies   Allergen Reactions    Latex Rash     second degree burn    Fish Allergy Anaphylaxis    Adhesive Tape Other (See Comments)     Redness, blisters    Fentanyl Other (See Comments)     Fentanyl patch caused panic attack    Hydrocodone-Acetaminophen      GI 
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]  Traumatic rupture of peroneal tendon, right, sequela [S86.311S]  Acquired hallux valgus of right foot [M20.11]  Arthritis of right ankle [M19.071]  Ambulatory dysfunction [R26.2]    Procedures:   2024      RIGHT FOOT ENDOSCOPIC GASTROCNEMUS RECESSION (Right: Foot)      PERCUTANEOUS CALCANEAL OSTEOTOMY, RICK CALCANEAL OSTEOTOMY, NAVICULAR CUNEIFORM ARTHRODESIS, FLEXOR HALLICUS LONGUS TENOTOMY, MODIFIED BROSTROM PROCEDURE, MEDIAL COLUMN FUSION AND LAPIDUS, PERONEAL BREVIS TENDON REPAIR + PNB (Right: Foot)      Pertinent Medical History: asthma, Bipolar disorder, fibromyalgia, DM, panic attacks, L ankle surgery     Precautions:  Fall Risk, no orders in chart - assume NWB R LE      Assessment of current deficits    [x] Functional mobility  [x]ADLs  [x] Strength               []Cognition    [x] Functional transfers   [x] IADLs         [x] Safety Awareness   [x]Endurance    [] Fine Coordination              [x] Balance      [] Vision/perception   []Sensation     []Gross Motor Coordination  [] ROM  [] Delirium                   [] Motor Control     OT PLAN OF CARE   OT POC based on physician orders, patient diagnosis and results of clinical assessment    Frequency/Duration  2-4 days/wk for 2 - 4weeks PRN   Specific OT Treatment Interventions to include:   ADL retraining/adapted techniques and AE recommendations to increase functional independence within precautions           
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]  Traumatic rupture of peroneal tendon, right, sequela [S86.311S]  Acquired hallux valgus of right foot [M20.11]  Arthritis of right ankle [M19.071]  Ambulatory dysfunction [R26.2]    Procedures:   2024      RIGHT FOOT ENDOSCOPIC GASTROCNEMUS RECESSION (Right: Foot)      PERCUTANEOUS CALCANEAL OSTEOTOMY, RICK CALCANEAL OSTEOTOMY, NAVICULAR CUNEIFORM ARTHRODESIS, FLEXOR HALLICUS LONGUS TENOTOMY, MODIFIED BROSTROM PROCEDURE, MEDIAL COLUMN FUSION AND LAPIDUS, PERONEAL BREVIS TENDON REPAIR + PNB (Right: Foot)      Pertinent Medical History: asthma, Bipolar disorder, fibromyalgia, DM, panic attacks, L ankle surgery     Precautions:  Fall Risk, no orders in chart - assume NWB R LE       Assessment of current deficits    [x] Functional mobility            [x]ADLs           [x] Strength                  []Cognition    [x] Functional transfers          [x] IADLs         [x] Safety Awareness   [x]Endurance    [] Fine Coordination                         [x] Balance      [] Vision/perception   []Sensation      []Gross Motor Coordination             [] ROM           [] Delirium                   [] Motor Control      OT PLAN OF CARE   OT POC based on physician orders, patient diagnosis and results of clinical assessment     Frequency/Duration  2-4 days/wk for 2 - 4weeks PRN   Specific OT Treatment Interventions to include:   ADL retraining/adapted techniques and AE recommendations to increase functional independence within precautions                    Energy conservation techniques to improve tolerance for selfcare routine   Functional 
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]  Traumatic rupture of peroneal tendon, right, sequela [S86.311S]  Acquired hallux valgus of right foot [M20.11]  Arthritis of right ankle [M19.071]  Ambulatory dysfunction [R26.2]    Procedures:   2024      RIGHT FOOT ENDOSCOPIC GASTROCNEMUS RECESSION (Right: Foot)      PERCUTANEOUS CALCANEAL OSTEOTOMY, RICK CALCANEAL OSTEOTOMY, NAVICULAR CUNEIFORM ARTHRODESIS, FLEXOR HALLICUS LONGUS TENOTOMY, MODIFIED BROSTROM PROCEDURE, MEDIAL COLUMN FUSION AND LAPIDUS, PERONEAL BREVIS TENDON REPAIR + PNB (Right: Foot)      Pertinent Medical History: asthma, Bipolar disorder, fibromyalgia, DM, panic attacks, L ankle surgery     Precautions:  Fall Risk, no orders in chart - assume NWB R LE       Assessment of current deficits    [x] Functional mobility            [x]ADLs           [x] Strength                  []Cognition    [x] Functional transfers          [x] IADLs         [x] Safety Awareness   [x]Endurance    [] Fine Coordination                         [x] Balance      [] Vision/perception   []Sensation      []Gross Motor Coordination             [] ROM           [] Delirium                   [] Motor Control      OT PLAN OF CARE   OT POC based on physician orders, patient diagnosis and results of clinical assessment     Frequency/Duration  2-4 days/wk for 2 - 4weeks PRN   Specific OT Treatment Interventions to include:   ADL retraining/adapted techniques and AE recommendations to increase functional independence within precautions                    Energy conservation techniques to improve tolerance for selfcare routine   Functional 
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]  Traumatic rupture of peroneal tendon, right, sequela [S86.311S]  Acquired hallux valgus of right foot [M20.11]  Arthritis of right ankle [M19.071]  Ambulatory dysfunction [R26.2]    Procedures:   2024      RIGHT FOOT ENDOSCOPIC GASTROCNEMUS RECESSION (Right: Foot)      PERCUTANEOUS CALCANEAL OSTEOTOMY, RICK CALCANEAL OSTEOTOMY, NAVICULAR CUNEIFORM ARTHRODESIS, FLEXOR HALLICUS LONGUS TENOTOMY, MODIFIED BROSTROM PROCEDURE, MEDIAL COLUMN FUSION AND LAPIDUS, PERONEAL BREVIS TENDON REPAIR + PNB (Right: Foot)      Pertinent Medical History: asthma, Bipolar disorder, fibromyalgia, DM, panic attacks, L ankle surgery     Precautions:  Fall Risk, no orders in chart - assume NWB R LE       Assessment of current deficits    [x] Functional mobility            [x]ADLs           [x] Strength                  []Cognition    [x] Functional transfers          [x] IADLs         [x] Safety Awareness   [x]Endurance    [] Fine Coordination                         [x] Balance      [] Vision/perception   []Sensation      []Gross Motor Coordination             [] ROM           [] Delirium                   [] Motor Control      OT PLAN OF CARE   OT POC based on physician orders, patient diagnosis and results of clinical assessment     Frequency/Duration  2-4 days/wk for 2 - 4weeks PRN   Specific OT Treatment Interventions to include:   ADL retraining/adapted techniques and AE recommendations to increase functional independence within precautions                    Energy conservation techniques to improve tolerance for selfcare routine   Functional 
PNB completed, patient tolerated well.  Family called to bedside.    
Patient declined  services at this time.  
Patient had this nurse check blood sugar a second time earlier and it was 69. Gave patient some juice and snacks. She checked her blood sugar again now per self on her own meter and said it was 189.   
Patient was asleep when  rounded.  left prayer card.  
Physical Therapy  Facility/Department: [facility]  Physical Therapy Treatment Note    Name: Ronny Uribe  : 1980  MRN: [id number]  Date of Service: 2024          Patient Diagnosis(es): Diagnoses of Acquired external rotation of foot, right, Traumatic rupture of peroneal tendon, right, sequela, Acquired hallux valgus of right foot, and Arthritis of right ankle were pertinent to this visit.  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  Past Surgical History:  has a past surgical history that includes Gastric bypass surgery (); [place] tooth extraction (); Rotator cuff repair (Right, ); Carpal tunnel release (Left, );  section (); Tubal ligation (); hernia repair (); Ovary removal (); Vagus nerve stimulator insertion (); Shoulder arthroscopy (Right); Temporomandibular joint surgery (Left, ); Temporomandibular joint surgery (Bilateral, ); Endometrial ablation (); Hysterectomy (); other surgical history (); Ankle surgery (Left, ); Rotator cuff repair (Right, ); Knee arthroscopy (Left, 2012); Knee arthroscopy (Right); Leg Tendon Surgery (Left); Foot Fusion; Foot fracture surgery (Left, 2019); other surgical history (Left, 2020); 
Physical Therapy  Facility/Department: [facility]  Physical Therapy Treatment Note    Name: Ronny Uribe  : 1980  MRN: [id number]  Date of Service: 2024          Patient Diagnosis(es): Diagnoses of Acquired external rotation of foot, right, Traumatic rupture of peroneal tendon, right, sequela, Acquired hallux valgus of right foot, and Arthritis of right ankle were pertinent to this visit.  Past Medical History:  has a past medical history of Agoraphobia, Arthritis, Asthma, Atypical nevi, Bipolar 1 disorder (HCC), Borderline personality disorder (HCC), Claustrophobia, Diabetes mellitus (HCC), Difficulty walking, Dissociative disorder, Fibromyalgia, Foot pain, right, Generalized anxiety disorder, GERD (gastroesophageal reflux disease), H/O migraine, Hidradenitis suppurativa, Hypoglycemia, IBS (irritable bowel syndrome), Muscle weakness, 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, Nutritional anemia, Osteopenia, Other manic episodes ([facility]), Paranoid schizophrenia ([facility]), Peripheral autonomic neuropathy, PONV (postoperative nausea and vomiting), Reduced mobility, Seizure ([facility]), Severe depression ([facility]), and Thyroid disease.  Past Surgical History:  has a past surgical history that includes Gastric bypass surgery (); [place] tooth extraction (); Rotator cuff repair (Right, ); Carpal tunnel release (Left, );  section (); Tubal ligation (); hernia repair (); Ovary removal (); Vagus nerve stimulator insertion (); Shoulder arthroscopy (Right); Temporomandibular joint surgery (Left, ); Temporomandibular joint surgery (Bilateral, ); Endometrial ablation (); Hysterectomy (); other surgical history (); Ankle surgery (Left, ); Rotator cuff repair (Right, ); Knee arthroscopy (Left, 2012); Knee arthroscopy (Right); Leg Tendon Surgery (Left); Foot Fusion; Foot fracture surgery (Left, 2019); other surgical history (Left, 2020); 
SPIRITUAL HEALTH SERVICES - [facility] Encounter    Name: Ronny Uribe                  Referral: Routine Visit    Sacraments  Anointed (Last Rites): Yes  Apostolic [place]: No  Confession: No  Communion: No     Assessment:  Patient receptive to  visit.      Intervention:   provided spiritual support and sacramental ministry for patient.     Outcome:  Patient expressed gratitude for visit.    Plan:  Chaplains will remain available to offer spiritual and emotional support as needed.      Electronically signed by Chaplain Rachael, on 7/23/2024 at 2:55 PM.  Spiritual Care Department  [facility]  [phone]   
Work order put in for VS monitor stating it is still attached to former patient. I am not able to use.  
Comments)     Fentanyl patch caused panic attack    Hydrocodone-Acetaminophen      GI BLEED    Iodine     Ketorolac Tromethamine Nausea Only    Medfix Ez [Wound Dressings]     Molds & Smuts     Other      pollen    Pregabalin Other (See Comments)    Tramadol Nausea Only    Buspirone Rash    Clindamycin Rash    Gabapentin Rash    Metronidazole Rash    Morphine Rash     IV \"a red line started going up towards my heart\"       /64   Pulse 73   Temp 98.5 °F (36.9 °C) (Oral)   Resp 18   Ht 1.676 m (5' 6\")   Wt 115 kg (253 lb 8.5 oz)   SpO2 98%   BMI 40.92 kg/m²       EXAM:    Post op dressings/ Staples cast in place. No calf pain.      Scheduled Meds:   polyethylene glycol  17 g Oral Daily    doxycycline  100 mg Oral 2 times per day    ceFAZolin  2,000 mg IntraVENous 30 Min Pre-Op    sodium chloride flush  5-40 mL IntraVENous 2 times per day    ALPRAZolam  0.75 mg Oral 4x daily    aspirin  81 mg Oral Daily    cloZAPine  100 mg Oral Nightly    docusate sodium  100 mg Oral BID    famotidine  40 mg Oral Daily    furosemide  20 mg Oral BID    linaclotide  145 mcg Oral QAM AC    montelukast  10 mg Oral Daily    therapeutic multivitamin-minerals  1 tablet Oral Daily    pantoprazole  40 mg Oral QAM AC    oxyCODONE HCl  10 mg Oral BID    tiZANidine  4 mg Oral TID    vitamin B-12  1,000 mcg Oral Daily    sodium chloride flush  5-40 mL IntraVENous 2 times per day    enoxaparin  30 mg SubCUTAneous BID    insulin lispro  0-4 Units SubCUTAneous TID WC    insulin lispro  0-4 Units SubCUTAneous Nightly    levothyroxine  125 mcg Oral Daily     Continuous Infusions:   sodium chloride      sodium chloride      dextrose       PRN Meds:.sodium chloride flush, sodium chloride, promethazine **OR** ondansetron, sodium chloride, potassium chloride **OR** potassium alternative oral replacement **OR** potassium chloride, magnesium sulfate, acetaminophen **OR** acetaminophen, dextrose bolus **OR** dextrose bolus, glucagon (rDNA), 
Reactions    Latex Rash     second degree burn    Fish Allergy Anaphylaxis    Adhesive Tape Other (See Comments)     Redness, blisters    Fentanyl Other (See Comments)     Fentanyl patch caused panic attack    Hydrocodone-Acetaminophen      GI BLEED    Iodine     Ketorolac Tromethamine Nausea Only    Medfix Ez [Wound Dressings]     Molds & Smuts     Other      pollen    Pregabalin Other (See Comments)    Tramadol Nausea Only    Buspirone Rash    Clindamycin Rash    Gabapentin Rash    Metronidazole Rash    Morphine Rash     IV \"a red line started going up towards my heart\"       /66   Pulse 70   Temp 98.6 °F (37 °C) (Oral)   Resp 18   Ht 1.676 m (5' 6\")   Wt 118 kg (260 lb 2.3 oz)   SpO2 97%   BMI 41.99 kg/m²       EXAM:    Post op dressings/ [place] cast are clean, dry and intact.  CFT less than 5 seconds to all exposed toes of the right foot.      Scheduled Meds:   polyethylene glycol  17 g Oral Daily    doxycycline  100 mg Oral 2 times per day    ceFAZolin  2,000 mg IntraVENous 30 Min Pre-Op    sodium chloride flush  5-40 mL IntraVENous 2 times per day    ALPRAZolam  0.75 mg Oral 4x daily    aspirin  81 mg Oral Daily    cloZAPine  100 mg Oral Nightly    docusate sodium  100 mg Oral BID    famotidine  40 mg Oral Daily    furosemide  20 mg Oral BID    linaclotide  145 mcg Oral QAM AC    montelukast  10 mg Oral Daily    therapeutic multivitamin-minerals  1 tablet Oral Daily    pantoprazole  40 mg Oral QAM AC    oxyCODONE HCl  10 mg Oral BID    tiZANidine  4 mg Oral TID    vitamin B-12  1,000 mcg Oral Daily    sodium chloride flush  5-40 mL IntraVENous 2 times per day    enoxaparin  30 mg SubCUTAneous BID    insulin lispro  0-4 Units SubCUTAneous TID WC    insulin lispro  0-4 Units SubCUTAneous Nightly    levothyroxine  125 mcg Oral Daily     Continuous Infusions:   sodium chloride      sodium chloride      dextrose       PRN Meds:.sodium chloride flush, sodium chloride, promethazine **OR** ondansetron, 
do not wear any nail polish, make up or hair products on the day of surgery    [x] You can expect a call the business day prior to procedure to notify you if your arrival time changes    [x] If you receive a survey after surgery we would greatly appreciate your comments    [] Parent/guardian of a minor must accompany their child and remain on the premises  the entire time they are under our care     [] Pediatric patients may bring favorite toy, blanket or comfort item with them    [] A caregiver or family member must remain with the patient during their stay if they are mentally handicapped, have dementia, disoriented or unable to use a call light or would be a safety concern if left unattended    [x] Please notify surgeon if you develop any illness between now and time of surgery (cold, cough, sore throat, fever, nausea, vomiting) or any signs of infections  including skin, wounds, and dental.    [x]  [facility] is available to fill your prescription here on your day of surgery, ask your preop nurse for details      
arthrodesis, flexor hallicus longus tenotomy, modified brostrom procedure, medial column fusion and lapidus, peroneal brevis tendon repair and PNB. Podiatry following for pain management, weight bearing status, and post op antibiotics.   Extensive psych history - continue home mediations.   Prediabetes - hold home ozempic and acarbose. Low dose SSI, hypoglycemia protocol, carb controlled diet.   Asthma - albuterol PRN nebulizer treatments.   GERD - continue pepcid and protonix.   Hypothyroidism - continue synthroid.     Medically stable discharge awaiting for pre-CERT for skilled nursing versus rehab      NOTE: This report was transcribed using voice recognition software. Every effort was made to ensure accuracy; however, inadvertent computerized transcription errors may be present.  Electronically signed by Beatriz Elder DO on 7/23/2024 at 1:48 PM    
Training to improve activity tolerance during functional mobility   [x] Transfer Training to improve safety and independence with all functional transfers   [] Gait Training to improve gait mechanics, endurance and assess need for appropriate assistive device  [] Stair Training in preparation for safe discharge home and/or into the community   [x] Positioning to prevent skin breakdown and contractures  [x] Safety and Education Training   [x] Patient/Caregiver Education   [] HEP  [] Other     Frequency of treatments will be 2-5x/week x 2-10 days.    Time in:  1357   Time out:  1410       Evaluation Time includes thorough review of current medical information, gathering information on past medical history/social history and prior level of function, completion of standardized testing/informal observation of tasks, assessment of data and education on plan of care and goals.    CPT codes:  [x] Low Complexity PT evaluation 93281  [] Moderate Complexity PT evaluation 13405  [] High Complexity PT evaluation 74880  [] PT Re-evaluation 86250  [] Gait training 32438  minutes  [] Therapeutic activities 35584  minutes  [] Therapeutic exercises 66415  minutes  [] Neuromuscular reeducation 74918  minutes       Dede Guevara  License number:  [id number]

## 2024-07-29 NOTE — CARE COORDINATION
7/29/2024  Social Work Discharge Planning:SW faxed updated therapy evals for Pts precert to go to [facility]-[phone]. Previous SW did ANDI,7000 and transport form;however, family may need to drive Pt to [facility]. Electronically signed by SHERIE Ramírez on 7/29/2024 at 10:06 AM      7/29/2024 Social Work Discharge Planning:SW was informed by Pt that her father will transport her to go to [facility] today. ZNEON notified nurse here and [facility] liaison. Electronically signed by SHERIE Ramírez on 7/29/2024 at 12:23 PM

## 2024-08-01 LAB — SURGICAL PATHOLOGY REPORT: NORMAL

## 2025-08-13 ENCOUNTER — AMBULATORY SURGICAL CENTER (OUTPATIENT)
Dept: URBAN - METROPOLITAN AREA SURGERY 6 | Facility: SURGERY | Age: 45
End: 2025-08-13
Payer: MEDICARE

## 2025-08-13 VITALS
HEART RATE: 61 BPM | DIASTOLIC BLOOD PRESSURE: 75 MMHG | HEART RATE: 61 BPM | TEMPERATURE: 96.5 F | RESPIRATION RATE: 13 BRPM | HEART RATE: 71 BPM | HEART RATE: 61 BPM | HEART RATE: 60 BPM | DIASTOLIC BLOOD PRESSURE: 56 MMHG | WEIGHT: 248 LBS | RESPIRATION RATE: 15 BRPM | RESPIRATION RATE: 16 BRPM | RESPIRATION RATE: 13 BRPM | DIASTOLIC BLOOD PRESSURE: 56 MMHG | HEART RATE: 64 BPM | WEIGHT: 248 LBS | OXYGEN SATURATION: 100 % | SYSTOLIC BLOOD PRESSURE: 88 MMHG | DIASTOLIC BLOOD PRESSURE: 67 MMHG | RESPIRATION RATE: 15 BRPM | DIASTOLIC BLOOD PRESSURE: 75 MMHG | DIASTOLIC BLOOD PRESSURE: 58 MMHG | HEART RATE: 70 BPM | RESPIRATION RATE: 16 BRPM | OXYGEN SATURATION: 94 % | HEART RATE: 70 BPM | HEIGHT: 66 IN | OXYGEN SATURATION: 99 % | DIASTOLIC BLOOD PRESSURE: 67 MMHG | RESPIRATION RATE: 15 BRPM | HEART RATE: 60 BPM | DIASTOLIC BLOOD PRESSURE: 58 MMHG | OXYGEN SATURATION: 98 % | RESPIRATION RATE: 16 BRPM | SYSTOLIC BLOOD PRESSURE: 88 MMHG | OXYGEN SATURATION: 99 % | SYSTOLIC BLOOD PRESSURE: 118 MMHG | TEMPERATURE: 96.5 F | HEART RATE: 64 BPM | SYSTOLIC BLOOD PRESSURE: 98 MMHG | HEIGHT: 66 IN | HEART RATE: 60 BPM | SYSTOLIC BLOOD PRESSURE: 115 MMHG | OXYGEN SATURATION: 98 % | DIASTOLIC BLOOD PRESSURE: 75 MMHG | DIASTOLIC BLOOD PRESSURE: 67 MMHG | RESPIRATION RATE: 13 BRPM | DIASTOLIC BLOOD PRESSURE: 65 MMHG | OXYGEN SATURATION: 94 % | HEART RATE: 70 BPM | TEMPERATURE: 96.5 F | DIASTOLIC BLOOD PRESSURE: 56 MMHG | SYSTOLIC BLOOD PRESSURE: 115 MMHG | DIASTOLIC BLOOD PRESSURE: 65 MMHG | OXYGEN SATURATION: 100 % | SYSTOLIC BLOOD PRESSURE: 88 MMHG | HEART RATE: 64 BPM | DIASTOLIC BLOOD PRESSURE: 65 MMHG | HEIGHT: 66 IN | HEART RATE: 71 BPM | SYSTOLIC BLOOD PRESSURE: 118 MMHG | OXYGEN SATURATION: 100 % | SYSTOLIC BLOOD PRESSURE: 118 MMHG | HEART RATE: 71 BPM | OXYGEN SATURATION: 99 % | SYSTOLIC BLOOD PRESSURE: 115 MMHG | OXYGEN SATURATION: 94 % | OXYGEN SATURATION: 98 % | SYSTOLIC BLOOD PRESSURE: 98 MMHG | DIASTOLIC BLOOD PRESSURE: 58 MMHG | WEIGHT: 248 LBS | SYSTOLIC BLOOD PRESSURE: 98 MMHG

## 2025-08-13 DIAGNOSIS — Z12.11 ENCOUNTER FOR SCREENING FOR MALIGNANT NEOPLASM OF COLON: ICD-10-CM

## 2025-08-13 DIAGNOSIS — Q43.8 OTHER SPECIFIED CONGENITAL MALFORMATIONS OF INTESTINE: ICD-10-CM

## 2025-08-13 DIAGNOSIS — K64.0 FIRST DEGREE HEMORRHOIDS: ICD-10-CM

## 2025-08-13 PROCEDURE — G0121 COLON CA SCRN NOT HI RSK IND: HCPCS | Performed by: INTERNAL MEDICINE

## 2025-08-26 ENCOUNTER — OFFICE (OUTPATIENT)
Dept: URBAN - METROPOLITAN AREA CLINIC 15 | Facility: CLINIC | Age: 45
End: 2025-08-26
Payer: MEDICARE

## 2025-08-26 VITALS
OXYGEN SATURATION: 96 % | HEART RATE: 64 BPM | SYSTOLIC BLOOD PRESSURE: 112 MMHG | WEIGHT: 256 LBS | HEIGHT: 66 IN | TEMPERATURE: 97.9 F | DIASTOLIC BLOOD PRESSURE: 64 MMHG

## 2025-08-26 DIAGNOSIS — Q43.8 OTHER SPECIFIED CONGENITAL MALFORMATIONS OF INTESTINE: ICD-10-CM

## 2025-08-26 DIAGNOSIS — K58.1 IRRITABLE BOWEL SYNDROME WITH CONSTIPATION: ICD-10-CM

## 2025-08-26 DIAGNOSIS — K22.70 BARRETT'S ESOPHAGUS WITHOUT DYSPLASIA: ICD-10-CM

## 2025-08-26 PROCEDURE — 99214 OFFICE O/P EST MOD 30 MIN: CPT | Performed by: CLINICAL NURSE SPECIALIST

## 2025-08-26 RX ORDER — LINACLOTIDE 290 UG/1
CAPSULE, GELATIN COATED ORAL
Qty: 90 | Refills: 3 | Status: COMPLETED
Start: 2025-08-26 | End: 2025-08-27 | Stop reason: SDUPTHER

## 2025-08-26 RX ORDER — OMEPRAZOLE 20 MG/1
TABLET, DELAYED RELEASE ORAL
Qty: 180 | Refills: 3 | Status: ACTIVE

## 2025-08-26 RX ORDER — LINACLOTIDE 145 UG/1
145 CAPSULE, GELATIN COATED ORAL
Qty: 90 | Refills: 3 | Status: COMPLETED
Start: 2025-05-15 | End: 2025-08-26

## (undated) DEVICE — PATIENT RETURN ELECTRODE, SINGLE-USE, CONTACT QUALITY MONITORING, ADULT, WITH 9FT CORD, FOR PATIENTS WEIGING OVER 33LBS. (15KG): Brand: MEGADYNE

## (undated) DEVICE — Device

## (undated) DEVICE — APPLICATOR  COTTON-TIPPED 6 IN WOOD STRL

## (undated) DEVICE — DRESSING PETRO W3XL8IN OIL EMUL N ADH GZ KNIT IMPREG CELOS

## (undated) DEVICE — STANDARD HYPODERMIC NEEDLE,POLYPROPYLENE HUB: Brand: MONOJECT

## (undated) DEVICE — T15 DRIVER, SOLID, AO, STERILE, SINGLE-USE: Brand: IN2BONES

## (undated) DEVICE — SOLUTION IRRIG 1000ML STRL H2O USP PLAS POUR BTL

## (undated) DEVICE — BLADE SAW LG 70X13X1.24 MM STRYKR SALTO TALARIS

## (undated) DEVICE — 4-PORT MANIFOLD: Brand: NEPTUNE 2

## (undated) DEVICE — SYRINGE MED 30ML STD CLR PLAS LUERLOCK TIP N CTRL DISP

## (undated) DEVICE — ELECTRODE PT RET AD L9FT HI MOIST COND ADH HYDRGEL CORDED

## (undated) DEVICE — BLADE SAW W12.5MM D70MM CUT THK0.94MM BRASSELER HUB RECIP

## (undated) DEVICE — PACK PROCEDURE SURG GEN CUST

## (undated) DEVICE — ANTISEPTIC 16OZ H PEROX 1ST AID ORAL DEBRIDING AGNT

## (undated) DEVICE — TRAY SET DIDOMENICO EXTRAS REUSABLE

## (undated) DEVICE — COVER,TABLE,60X90,STERILE: Brand: MEDLINE

## (undated) DEVICE — DRIVER SURG T15 SOLID

## (undated) DEVICE — GOWN,SIRUS,FABRNF,XL,20/CS: Brand: MEDLINE

## (undated) DEVICE — TOWEL,OR,DSP,ST,BLUE,STD,6/PK,12PK/CS: Brand: MEDLINE

## (undated) DEVICE — STOCKINETTE,DOUBLE PLY,6X48,STERILE: Brand: MEDLINE

## (undated) DEVICE — PRECISION THIN (9.0 X 0.38 X 25.0MM)

## (undated) DEVICE — PADDING UNDERCAST W4INXL4YD COT FBR LO LINTING WYTEX

## (undated) DEVICE — RETRACTOR SURG L9.25IN BLDE W18MM S STL SATIN FINISH HOHMN

## (undated) DEVICE — ZIMMER® STERILE DISPOSABLE TOURNIQUET CUFF WITH PLC, DUAL PORT, SINGLE BLADDER, 34 IN. (86 CM)

## (undated) DEVICE — COVER HNDL LT DISP

## (undated) DEVICE — SYRINGE MED 50ML LUERLOCK TIP

## (undated) DEVICE — CHLORAPREP 26ML ORANGE

## (undated) DEVICE — DEVICE SUTURE ADH RETENTION RIGID HEMI BRIDGE LAYR

## (undated) DEVICE — SPONGE LAP W18XL18IN WHT COT 4 PLY FLD STRUNG RADPQ DISP ST

## (undated) DEVICE — TUBING SUCT 12FR MAL ALUM SHFT FN CAP VENT UNIV CONN W/ OBT

## (undated) DEVICE — DOUBLE BASIN SET: Brand: MEDLINE INDUSTRIES, INC.

## (undated) DEVICE — K WIRE FIX L6IN DIA0.062IN 1600662] MICROAIRE SURGICAL INSTRUMENTS INC]
Type: IMPLANTABLE DEVICE | Site: FOOT | Status: NON-FUNCTIONAL
Removed: 2021-12-06

## (undated) DEVICE — BANDAGE COMPR W6INXL10YD ST M E WHITE/BEIGE

## (undated) DEVICE — TAPE ADH CLTH SILK H2O REPELLENT CURAD

## (undated) DEVICE — SYSTEM TPS ORTHO

## (undated) DEVICE — SET ORTHO STD STORTSTD1

## (undated) DEVICE — DERMATOME BLADES: Brand: DERMATOME

## (undated) DEVICE — DRAPE,EXTREMITY,89X128,STERILE: Brand: MEDLINE

## (undated) DEVICE — CFX/AFX STERILE SINGLEUSE INSTRUMENTS,3.5 MM AND 4.0MM SCREW: Brand: IN2BONES

## (undated) DEVICE — MARKER,SKIN,WI/RULER AND LABELS: Brand: MEDLINE

## (undated) DEVICE — BANDAGE,GAUZE,BULKEE II,4.5"X4.1YD,STRL: Brand: MEDLINE

## (undated) DEVICE — BNDG,ELSTC,MATRIX,STRL,6"X5YD,LF,HOOK&LP: Brand: MEDLINE

## (undated) DEVICE — NEEDLE HYPO 22GA L1.5IN BLK POLYPR HUB S STL REG BVL STR

## (undated) DEVICE — DRAPE C ARM W41XL74IN UNIV MOB W RUBBERBAND CLP

## (undated) DEVICE — CAMERA STRYKER 1488 HD GEN

## (undated) DEVICE — PAD,ABDOMINAL,5"X9",ST,LF,25/BX: Brand: MEDLINE INDUSTRIES, INC.

## (undated) DEVICE — SPLINT OCL 2 PLSTR SPLNTING SYS 15 LAYR 5INX20FT

## (undated) DEVICE — BLADE, TONGUE, 6", STERILE: Brand: MEDLINE

## (undated) DEVICE — SET ARTHROSCOPY INST

## (undated) DEVICE — INTENDED FOR TISSUE SEPARATION, AND OTHER PROCEDURES THAT REQUIRE A SHARP SURGICAL BLADE TO PUNCTURE OR CUT.: Brand: BARD-PARKER ® STAINLESS STEEL BLADES

## (undated) DEVICE — LOW-PRO CORTICAL SCREW, Ø3.5X36MM
Type: IMPLANTABLE DEVICE | Site: FOOT | Status: NON-FUNCTIONAL
Brand: COLINK™ AFX
Removed: 2024-07-22

## (undated) DEVICE — DRESSING COMP W4XL4IN N ADH PD W2.5XL2.5IN GZ BORDERED ADH

## (undated) DEVICE — SUTURE VCRL + SZ 3-0 L18IN ABSRB UD PS-1 L24MM 3/8 CIR PRIM VCP683G

## (undated) DEVICE — PACK,UNIV, II AURORA: Brand: MEDLINE

## (undated) DEVICE — BNDG,ELSTC,MATRIX,STRL,4"X5YD,LF,HOOK&LP: Brand: MEDLINE

## (undated) DEVICE — PADDING UNDERCAST W6INXL4YD WYTEX 6 PER BG

## (undated) DEVICE — PADDING,UNDERCAST,COTTON, 4"X4YD STERILE: Brand: MEDLINE

## (undated) DEVICE — PLATES COLINK CFX STER INSTR DISP

## (undated) DEVICE — DRESSING,GAUZE,XEROFORM,CURAD,1"X8",ST: Brand: CURAD

## (undated) DEVICE — DRAPE CARM MINI FOR IMAG SYS INSIGHT FLROSCN

## (undated) DEVICE — GLOVE ORTHO 7 1/2   MSG9475

## (undated) DEVICE — INSTRUMENT SYSTEM 7 BATTERY REUSABLE

## (undated) DEVICE — ABSORBENT, WATERPROOF, BACTERIA PROOF FILM DRESSING: Brand: OPSITE POST OP 15.5X8.5CM CTN 20

## (undated) DEVICE — 3M™ COBAN™ NL STERILE NON-LATEX SELF-ADHERENT WRAP, 2084S, 4 IN X 5 YD (10 CM X 4,5 M), 18 ROLLS/CASE: Brand: 3M™ COBAN™

## (undated) DEVICE — CONTROL SYRINGE LUER-LOCK TIP: Brand: MONOJECT

## (undated) DEVICE — TRAY ORTHO1 REUSABLE

## (undated) DEVICE — APPLICATOR MEDICATED 26 CC SOLUTION HI LT ORNG CHLORAPREP

## (undated) DEVICE — NEEDLE FLTR 18GA L1.5IN MEM THK5UM BLNT DISP

## (undated) DEVICE — Z DISCONTINUED GLOVE SURG SZ 7.5 L12IN FNGR THK13MIL WHT ISOLEX

## (undated) DEVICE — SOLUTION IV IRRIG POUR BRL 0.9% SODIUM CHL 2F7124

## (undated) DEVICE — BUR SURG L70MM HD L8MM DIA4MM STR SHANK 235MM 8 FLUT MIC

## (undated) DEVICE — PADDING CAST W6INXL4YD COT LO LINTING WYTEX

## (undated) DEVICE — DRAPE,REIN 53X77,STERILE: Brand: MEDLINE

## (undated) DEVICE — 3M™ IOBAN™ 2 ANTIMICROBIAL INCISE DRAPE 6640EZ: Brand: IOBAN™ 2

## (undated) DEVICE — TRAY DRILL SYSTEM 4 REUSABLE

## (undated) DEVICE — BANDAGE COMPR W3INXL5YD WHT BGE POLY COT M E WRP WV HK AND

## (undated) DEVICE — SPONGE LAP W18XL18IN WHT COT 4 PLY FLD STRUNG RADPQ DISP ST 2 PER PACK

## (undated) DEVICE — TRAY SYNTHES LCP SMALL FRAG SET REUSABLE

## (undated) DEVICE — K WIRE FIX L6IN DIA0.062IN 1600662] MICROAIRE SURGICAL INSTRUMENTS INC]
Type: IMPLANTABLE DEVICE | Site: FOOT | Status: NON-FUNCTIONAL
Removed: 2022-03-04

## (undated) DEVICE — BANDAGE CAST W6XL180IN PLSTR OF PARIS UNIFORMLY COAT HI DRY

## (undated) DEVICE — DERMATOME ZIMMER

## (undated) DEVICE — INSTRUMENT SYSTEM 4 BATTERY REUSABLE

## (undated) DEVICE — BLADE,STAINLESS-STEEL,10,STRL,DISPOSABLE: Brand: MEDLINE

## (undated) DEVICE — SET LAMBOTTE

## (undated) DEVICE — BIT DRL L5IN DIA2MM STD ST S STL TWST BUSA

## (undated) DEVICE — PRECISION THIN (9.0 X 0.38 X 31.0MM)

## (undated) DEVICE — BLADE,STAINLESS-STEEL,15,STRL,DISPOSABLE: Brand: MEDLINE

## (undated) DEVICE — TAPE CAST W4INXL4YD WHT FBRGLS POLYUR RESIN LO TACK RIG

## (undated) DEVICE — BANDAGE COMPR W6INXL12FT SMOOTH FOR LIMB EXSANG ESMARCH

## (undated) DEVICE — SYRINGE MED 10ML POLYPR LUERSLIP TIP FLAT TOP W/O SFTY DISP

## (undated) DEVICE — GAUZE,SPONGE,4"X4",8PLY,STRL,LF,10/TRAY: Brand: MEDLINE

## (undated) DEVICE — BIT DRL L110MM DIA2.5MM G QUIK CPL W/O STP REUSE

## (undated) DEVICE — C-ARMOR C-ARM EQUIPMENT COVERS CLEAR STERILE UNIVERSAL FIT 12 PER CASE: Brand: C-ARMOR

## (undated) DEVICE — PADDING CAST W4INXL4YD SPUN DACRON POLY POR SYN VERSATILE

## (undated) DEVICE — SPECIMEN CUP W/LID: Brand: DEROYAL

## (undated) DEVICE — TRAY SYSTEM 7 DRILL REUSABLE

## (undated) DEVICE — LAG SCREW COUNTERSINK, HUDSON, Ø6.7, NON-STERILE: Brand: 5MS

## (undated) DEVICE — PICO 7 10CM X 30CM: Brand: PICO™ 7

## (undated) DEVICE — NEEDLE HYPO 23GA L1.5IN TURQ POLYPR HUB S STL THN WALL IM

## (undated) DEVICE — LIQUIBAND RAPID ADHESIVE 36/CS 0.8ML: Brand: MEDLINE

## (undated) DEVICE — BLADE SAW W21MM D85MM CUT THK1.27MM STRYKR SYS 7 HUB DISP

## (undated) DEVICE — SOLUTION IRRIG 1000ML 0.9% SOD CHL USP POUR PLAS BTL

## (undated) DEVICE — BRUNNS CURRETTES

## (undated) DEVICE — TRAP SPEC MUCUS FOR SUCT

## (undated) DEVICE — SINGLE USE DEVICE INTENDED TO COVER EXPOSED ENDS OF ORTHOPEDIC PIN AND K-WIRES TO HELP PROTECT THE EXPOSED WIRE FROM SNAGGING ON CLOTHING.: Brand: OXBORO™ PIN COVER

## (undated) DEVICE — ZIMMER® STERILE DISPOSABLE TOURNIQUET CUFF WITH PLC, DUAL PORT, SINGLE BLADDER, 18 IN. (46 CM)

## (undated) DEVICE — HOOK/ TRIANGLE BLADE SET: Brand: ENDOTRAC

## (undated) DEVICE — NEEDLE HYPO 18GA L1.5IN PNK S STL HUB POLYPR SHLD REG BVL

## (undated) DEVICE — CLAMP ALLIS REG